# Patient Record
Sex: FEMALE | Race: WHITE | Employment: FULL TIME | URBAN - METROPOLITAN AREA
[De-identification: names, ages, dates, MRNs, and addresses within clinical notes are randomized per-mention and may not be internally consistent; named-entity substitution may affect disease eponyms.]

---

## 2017-03-15 ENCOUNTER — OFFICE VISIT (OUTPATIENT)
Dept: FAMILY MEDICINE CLINIC | Age: 62
End: 2017-03-15
Payer: COMMERCIAL

## 2017-03-15 VITALS
WEIGHT: 185 LBS | DIASTOLIC BLOOD PRESSURE: 82 MMHG | HEART RATE: 80 BPM | SYSTOLIC BLOOD PRESSURE: 110 MMHG | RESPIRATION RATE: 18 BRPM | BODY MASS INDEX: 36.13 KG/M2
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
## 2017-03-15 DIAGNOSIS — M25.562 ACUTE PAIN OF LEFT KNEE: Primary | ICD-10-CM

## 2017-03-15 PROCEDURE — 3014F SCREEN MAMMO DOC REV: CPT | Performed by: NURSE PRACTITIONER

## 2017-03-15 PROCEDURE — G8427 DOCREV CUR MEDS BY ELIG CLIN: HCPCS | Performed by: NURSE PRACTITIONER

## 2017-03-15 PROCEDURE — G8484 FLU IMMUNIZE NO ADMIN: HCPCS | Performed by: NURSE PRACTITIONER

## 2017-03-15 PROCEDURE — 3017F COLORECTAL CA SCREEN DOC REV: CPT | Performed by: NURSE PRACTITIONER

## 2017-03-15 PROCEDURE — 99214 OFFICE O/P EST MOD 30 MIN: CPT | Performed by: NURSE PRACTITIONER

## 2017-03-15 PROCEDURE — G8417 CALC BMI ABV UP PARAM F/U: HCPCS | Performed by: NURSE PRACTITIONER

## 2017-03-15 PROCEDURE — 1036F TOBACCO NON-USER: CPT | Performed by: NURSE PRACTITIONER

## 2017-03-15 ASSESSMENT — ENCOUNTER SYMPTOMS
CHEST TIGHTNESS: 0
BLOOD IN STOOL: 0
ABDOMINAL PAIN: 0
SHORTNESS OF BREATH: 0

## 2017-03-16 ENCOUNTER — HOSPITAL ENCOUNTER (OUTPATIENT)
Facility: CLINIC | Age: 62
Discharge: HOME | End: 2017-03-16
Payer: COMMERCIAL

## 2017-03-16 ENCOUNTER — HOSPITAL ENCOUNTER (OUTPATIENT)
Dept: GENERAL RADIOLOGY | Facility: CLINIC | Age: 62
Discharge: HOME | End: 2017-03-16
Payer: COMMERCIAL

## 2017-03-16 DIAGNOSIS — M25.562 ACUTE PAIN OF LEFT KNEE: ICD-10-CM

## 2017-03-16 PROCEDURE — 73562 X-RAY EXAM OF KNEE 3: CPT

## 2017-03-17 DIAGNOSIS — Z13.820 SCREENING FOR OSTEOPOROSIS: Primary | ICD-10-CM

## 2017-03-19 ENCOUNTER — OFFICE VISIT (OUTPATIENT)
Dept: FAMILY MEDICINE CLINIC | Age: 62
End: 2017-03-19
Payer: COMMERCIAL

## 2017-03-19 VITALS
SYSTOLIC BLOOD PRESSURE: 149 MMHG | WEIGHT: 185 LBS | DIASTOLIC BLOOD PRESSURE: 66 MMHG | BODY MASS INDEX: 36.32 KG/M2 | HEIGHT: 60 IN | TEMPERATURE: 97.7 F | HEART RATE: 77 BPM | OXYGEN SATURATION: 97 %

## 2017-03-19 DIAGNOSIS — J20.9 ACUTE BRONCHITIS, UNSPECIFIED ORGANISM: Primary | ICD-10-CM

## 2017-03-19 DIAGNOSIS — J45.901 MILD ASTHMA EXACERBATION: ICD-10-CM

## 2017-03-19 PROCEDURE — G8427 DOCREV CUR MEDS BY ELIG CLIN: HCPCS | Performed by: INTERNAL MEDICINE

## 2017-03-19 PROCEDURE — 3017F COLORECTAL CA SCREEN DOC REV: CPT | Performed by: INTERNAL MEDICINE

## 2017-03-19 PROCEDURE — G8484 FLU IMMUNIZE NO ADMIN: HCPCS | Performed by: INTERNAL MEDICINE

## 2017-03-19 PROCEDURE — 3014F SCREEN MAMMO DOC REV: CPT | Performed by: INTERNAL MEDICINE

## 2017-03-19 PROCEDURE — 99213 OFFICE O/P EST LOW 20 MIN: CPT | Performed by: INTERNAL MEDICINE

## 2017-03-19 PROCEDURE — G8417 CALC BMI ABV UP PARAM F/U: HCPCS | Performed by: INTERNAL MEDICINE

## 2017-03-19 PROCEDURE — 1036F TOBACCO NON-USER: CPT | Performed by: INTERNAL MEDICINE

## 2017-03-19 RX ORDER — BENZONATATE 100 MG/1
100 CAPSULE ORAL 3 TIMES DAILY PRN
Qty: 10 CAPSULE | Refills: 0 | Status: SHIPPED | OUTPATIENT
Start: 2017-03-19 | End: 2017-03-22

## 2017-03-19 RX ORDER — PREDNISONE 50 MG/1
50 TABLET ORAL DAILY
Qty: 5 TABLET | Refills: 0 | Status: SHIPPED | OUTPATIENT
Start: 2017-03-19 | End: 2017-03-24

## 2017-03-19 RX ORDER — AZITHROMYCIN 250 MG/1
TABLET, FILM COATED ORAL
Qty: 1 PACKET | Refills: 0 | Status: SHIPPED | OUTPATIENT
Start: 2017-03-19 | End: 2017-03-29 | Stop reason: ALTCHOICE

## 2017-03-19 ASSESSMENT — ENCOUNTER SYMPTOMS
WHEEZING: 1
COUGH: 1

## 2017-03-29 ENCOUNTER — OFFICE VISIT (OUTPATIENT)
Dept: FAMILY MEDICINE CLINIC | Age: 62
End: 2017-03-29
Payer: COMMERCIAL

## 2017-03-29 VITALS
SYSTOLIC BLOOD PRESSURE: 126 MMHG | HEART RATE: 64 BPM | WEIGHT: 185 LBS | RESPIRATION RATE: 18 BRPM | BODY MASS INDEX: 36.13 KG/M2 | DIASTOLIC BLOOD PRESSURE: 80 MMHG

## 2017-03-29 DIAGNOSIS — E66.09 NON MORBID OBESITY DUE TO EXCESS CALORIES: ICD-10-CM

## 2017-03-29 DIAGNOSIS — Z12.39 SCREENING FOR BREAST CANCER: ICD-10-CM

## 2017-03-29 DIAGNOSIS — Z23 NEED FOR PNEUMOCOCCAL VACCINATION: ICD-10-CM

## 2017-03-29 DIAGNOSIS — Z12.11 SCREEN FOR COLON CANCER: ICD-10-CM

## 2017-03-29 DIAGNOSIS — M25.562 ACUTE PAIN OF LEFT KNEE: Primary | ICD-10-CM

## 2017-03-29 PROCEDURE — 3014F SCREEN MAMMO DOC REV: CPT | Performed by: NURSE PRACTITIONER

## 2017-03-29 PROCEDURE — 99214 OFFICE O/P EST MOD 30 MIN: CPT | Performed by: NURSE PRACTITIONER

## 2017-03-29 PROCEDURE — 90732 PPSV23 VACC 2 YRS+ SUBQ/IM: CPT | Performed by: NURSE PRACTITIONER

## 2017-03-29 PROCEDURE — G8417 CALC BMI ABV UP PARAM F/U: HCPCS | Performed by: NURSE PRACTITIONER

## 2017-03-29 PROCEDURE — G8484 FLU IMMUNIZE NO ADMIN: HCPCS | Performed by: NURSE PRACTITIONER

## 2017-03-29 PROCEDURE — 1036F TOBACCO NON-USER: CPT | Performed by: NURSE PRACTITIONER

## 2017-03-29 PROCEDURE — 3017F COLORECTAL CA SCREEN DOC REV: CPT | Performed by: NURSE PRACTITIONER

## 2017-03-29 PROCEDURE — G8427 DOCREV CUR MEDS BY ELIG CLIN: HCPCS | Performed by: NURSE PRACTITIONER

## 2017-03-29 PROCEDURE — 90471 IMMUNIZATION ADMIN: CPT | Performed by: NURSE PRACTITIONER

## 2017-03-29 ASSESSMENT — ENCOUNTER SYMPTOMS
CHEST TIGHTNESS: 0
BLOOD IN STOOL: 0
SHORTNESS OF BREATH: 0
ABDOMINAL PAIN: 0

## 2017-03-29 ASSESSMENT — PATIENT HEALTH QUESTIONNAIRE - PHQ9
SUM OF ALL RESPONSES TO PHQ QUESTIONS 1-9: 0
SUM OF ALL RESPONSES TO PHQ9 QUESTIONS 1 & 2: 0
1. LITTLE INTEREST OR PLEASURE IN DOING THINGS: 0
2. FEELING DOWN, DEPRESSED OR HOPELESS: 0

## 2017-04-01 LAB
CHOLESTEROL, TOTAL: 193 MG/DL
CHOLESTEROL/HDL RATIO: 4.4
HDLC SERPL-MCNC: 44 MG/DL (ref 35–70)
LDL CHOLESTEROL CALCULATED: 121 MG/DL (ref 0–160)
TRIGL SERPL-MCNC: 138 MG/DL
VLDLC SERPL CALC-MCNC: 28 MG/DL

## 2017-04-03 ENCOUNTER — TELEPHONE (OUTPATIENT)
Dept: FAMILY MEDICINE CLINIC | Age: 62
End: 2017-04-03

## 2017-04-03 DIAGNOSIS — E66.09 NON MORBID OBESITY DUE TO EXCESS CALORIES: ICD-10-CM

## 2017-04-03 DIAGNOSIS — Z12.11 SCREEN FOR COLON CANCER: ICD-10-CM

## 2017-04-03 LAB
CONTROL: PRESENT
HEMOCCULT STL QL: NEGATIVE

## 2017-04-03 PROCEDURE — 82274 ASSAY TEST FOR BLOOD FECAL: CPT | Performed by: NURSE PRACTITIONER

## 2017-04-24 DIAGNOSIS — Z13.820 SCREENING FOR OSTEOPOROSIS: ICD-10-CM

## 2017-12-21 ENCOUNTER — OFFICE VISIT (OUTPATIENT)
Dept: FAMILY MEDICINE CLINIC | Age: 62
End: 2017-12-21
Payer: COMMERCIAL

## 2017-12-21 VITALS
TEMPERATURE: 98.2 F | BODY MASS INDEX: 37.21 KG/M2 | DIASTOLIC BLOOD PRESSURE: 82 MMHG | RESPIRATION RATE: 16 BRPM | WEIGHT: 202.2 LBS | HEART RATE: 79 BPM | OXYGEN SATURATION: 94 % | HEIGHT: 62 IN | SYSTOLIC BLOOD PRESSURE: 139 MMHG

## 2017-12-21 DIAGNOSIS — J20.9 ACUTE BRONCHITIS, UNSPECIFIED ORGANISM: ICD-10-CM

## 2017-12-21 DIAGNOSIS — R06.2 WHEEZING: ICD-10-CM

## 2017-12-21 DIAGNOSIS — J45.21 INTERMITTENT ASTHMA WITH ACUTE EXACERBATION, UNSPECIFIED ASTHMA SEVERITY: ICD-10-CM

## 2017-12-21 DIAGNOSIS — J01.90 ACUTE SINUSITIS, RECURRENCE NOT SPECIFIED, UNSPECIFIED LOCATION: Primary | ICD-10-CM

## 2017-12-21 PROCEDURE — G8484 FLU IMMUNIZE NO ADMIN: HCPCS | Performed by: FAMILY MEDICINE

## 2017-12-21 PROCEDURE — 3014F SCREEN MAMMO DOC REV: CPT | Performed by: FAMILY MEDICINE

## 2017-12-21 PROCEDURE — G8427 DOCREV CUR MEDS BY ELIG CLIN: HCPCS | Performed by: FAMILY MEDICINE

## 2017-12-21 PROCEDURE — 1036F TOBACCO NON-USER: CPT | Performed by: FAMILY MEDICINE

## 2017-12-21 PROCEDURE — 99213 OFFICE O/P EST LOW 20 MIN: CPT | Performed by: FAMILY MEDICINE

## 2017-12-21 PROCEDURE — 3017F COLORECTAL CA SCREEN DOC REV: CPT | Performed by: FAMILY MEDICINE

## 2017-12-21 PROCEDURE — G8417 CALC BMI ABV UP PARAM F/U: HCPCS | Performed by: FAMILY MEDICINE

## 2017-12-21 RX ORDER — BENZONATATE 100 MG/1
100 CAPSULE ORAL 3 TIMES DAILY PRN
Qty: 25 CAPSULE | Refills: 0 | Status: SHIPPED | OUTPATIENT
Start: 2017-12-21 | End: 2017-12-26 | Stop reason: SDUPTHER

## 2017-12-21 RX ORDER — IBUPROFEN 800 MG/1
800 TABLET ORAL
COMMUNITY
Start: 2017-10-07 | End: 2017-12-21 | Stop reason: ALTCHOICE

## 2017-12-21 RX ORDER — AZITHROMYCIN 250 MG/1
250 TABLET, FILM COATED ORAL DAILY
Qty: 6 TABLET | Refills: 0 | Status: SHIPPED | OUTPATIENT
Start: 2017-12-21 | End: 2017-12-26

## 2017-12-21 RX ORDER — INFLUENZA A VIRUS A/MICHIGAN/45/2015 X-275 (H1N1) ANTIGEN (FORMALDEHYDE INACTIVATED), INFLUENZA A VIRUS A/SINGAPORE/INFIMH-16-0019/2016 IVR-186 (H3N2) ANTIGEN (FORMALDEHYDE INACTIVATED), INFLUENZA B VIRUS B/PHUKET/3073/2013 ANTIGEN (FORMALDEHYDE INACTIVATED), AND INFLUENZA B VIRUS B/MARYLAND/15/2016 BX-69A ANTIGEN (FORMALDEHYDE INACTIVATED) 15; 15; 15; 15 UG/.5ML; UG/.5ML; UG/.5ML; UG/.5ML
INJECTION, SUSPENSION INTRAMUSCULAR
COMMUNITY
Start: 2017-09-13

## 2017-12-21 RX ORDER — PREDNISONE 20 MG/1
20 TABLET ORAL 2 TIMES DAILY
Qty: 10 TABLET | Refills: 0 | Status: SHIPPED | OUTPATIENT
Start: 2017-12-21 | End: 2017-12-26

## 2017-12-21 RX ORDER — IBUPROFEN 800 MG/1
TABLET ORAL
COMMUNITY
Start: 2017-10-07 | End: 2017-12-21 | Stop reason: ALTCHOICE

## 2017-12-21 ASSESSMENT — ENCOUNTER SYMPTOMS
WHEEZING: 1
COUGH: 1
RHINORRHEA: 0
EYE REDNESS: 0
SINUS PRESSURE: 1
DIARRHEA: 0
SHORTNESS OF BREATH: 0
NAUSEA: 0
CONSTIPATION: 0
SORE THROAT: 0
HEMOPTYSIS: 0
SINUS PAIN: 1
VOICE CHANGE: 0
VOMITING: 0
EYE DISCHARGE: 0
TROUBLE SWALLOWING: 0
ABDOMINAL PAIN: 0
CHEST TIGHTNESS: 0
EYE ITCHING: 0
BACK PAIN: 0

## 2017-12-21 NOTE — PATIENT INSTRUCTIONS
and go to all appointments, and call your doctor if you are having problems. It's also a good idea to know your test results and keep a list of the medicines you take. How can you care for yourself at home? · Take all medicines exactly as prescribed. Call your doctor if you think you are having a problem with your medicine. · Get some extra rest.  · Take an over-the-counter pain medicine, such as acetaminophen (Tylenol), ibuprofen (Advil, Motrin), or naproxen (Aleve) to reduce fever and relieve body aches. Read and follow all instructions on the label. · Do not take two or more pain medicines at the same time unless the doctor told you to. Many pain medicines have acetaminophen, which is Tylenol. Too much acetaminophen (Tylenol) can be harmful. · Take an over-the-counter cough medicine that contains dextromethorphan to help quiet a dry, hacking cough so that you can sleep. Avoid cough medicines that have more than one active ingredient. Read and follow all instructions on the label. · Breathe moist air from a humidifier, hot shower, or sink filled with hot water. The heat and moisture will thin mucus so you can cough it out. · Do not smoke. Smoking can make bronchitis worse. If you need help quitting, talk to your doctor about stop-smoking programs and medicines. These can increase your chances of quitting for good. When should you call for help? Call 911 anytime you think you may need emergency care. For example, call if:  ? · You have severe trouble breathing. ?Call your doctor now or seek immediate medical care if:  ? · You have new or worse trouble breathing. ? · You cough up dark brown or bloody mucus (sputum). ? · You have a new or higher fever. ? · You have a new rash. ? Watch closely for changes in your health, and be sure to contact your doctor if:  ? · You cough more deeply or more often, especially if you notice more mucus or a change in the color of your mucus.    ? · You are not getting (Tylenol) can be harmful. · Breathe warm, moist air from a steamy shower, a hot bath, or a sink filled with hot water. Avoid cold, dry air. Using a humidifier in your home may help. Follow the directions for cleaning the machine. · Use saline (saltwater) nasal washes to help keep your nasal passages open and wash out mucus and bacteria. You can buy saline nose drops at a grocery store or drugstore. Or you can make your own at home by adding 1 teaspoon of salt and 1 teaspoon of baking soda to 2 cups of distilled water. If you make your own, fill a bulb syringe with the solution, insert the tip into your nostril, and squeeze gently. Fide Conine your nose. · Put a hot, wet towel or a warm gel pack on your face 3 or 4 times a day for 5 to 10 minutes each time. · Try a decongestant nasal spray like oxymetazoline (Afrin). Do not use it for more than 3 days in a row. Using it for more than 3 days can make your congestion worse. When should you call for help? Call your doctor now or seek immediate medical care if:  ? · You have new or worse swelling or redness in your face or around your eyes. ? · You have a new or higher fever. ? Watch closely for changes in your health, and be sure to contact your doctor if:  ? · You have new or worse facial pain. ? · The mucus from your nose becomes thicker (like pus) or has new blood in it. ? · You are not getting better as expected. Where can you learn more? Go to https://Novintroqueeb.Clickberry. org and sign in to your Pidefarma account. Enter K086 in the [facility] box to learn more about \"Sinusitis: Care Instructions. \"     If you do not have an account, please click on the \"Sign Up Now\" link. Current as of: May 12, 2017  Content Version: 11.4  © 8754-8639 Healthwise, Incorporated. Care instructions adapted under license by [facility] ([facility] [place]).  If you have questions about a medical condition or this instruction, always ask your healthcare professional. [employer] disclaims any warranty or liability for your use of this information. Patient Education        Wheezing or Bronchoconstriction: Care Instructions  Your Care Instructions  Wheezing is a whistling noise made during breathing. It occurs when the small airways, or bronchial tubes, that lead to your lungs swell or contract (spasm) and become narrow. This narrowing is called bronchoconstriction. When your airways constrict, it is hard for air to pass through and this makes it hard for you to breathe. Wheezing and bronchoconstriction can be caused by many problems, including:  · An infection such as the flu or a cold. · Allergies such as hay fever. · Diseases such as asthma or chronic obstructive pulmonary disease. · Smoking. Treatment for your wheezing depends on what is causing the problem. Your wheezing may get better without treatment. But you may need to pay attention to things that cause your wheezing and avoid them. Or you may need medicine to help treat the wheezing and to reduce the swelling or to relieve spasms in your lungs. Follow-up care is a key part of your treatment and safety. Be sure to make and go to all appointments, and call your doctor if you are having problems. It is also a good idea to know your test results and keep a list of the medicines you take. How can you care for yourself at home? · Take your medicine exactly as prescribed. Call your doctor if you think you are having a problem with your medicine. You will get more details on the specific medicine your doctor prescribes. · If your doctor prescribed antibiotics, take them as directed. Do not stop taking them just because you feel better. You need to take the full course of antibiotics. · Breathe moist air from a humidifier, hot shower, or sink filled with hot water. This may help ease your symptoms and make it easier for you to breathe.   · If you have congestion in your nose and throat, drinking plenty of fluids, especially hot fluids, may help relieve your symptoms. If you have kidney, heart, or liver disease and have to limit fluids, talk with your doctor before you increase the amount of fluids you drink. · If you have mucus in your airways, it may help to breathe deeply and cough. · Do not smoke or allow others to smoke around you. Smoking can make your wheezing worse. If you need help quitting, talk to your doctor about stop-smoking programs and medicines. These can increase your chances of quitting for good. · Avoid things that may cause your wheezing. These may include colds, smoke, air pollution, dust, pollen, pets, cockroaches, stress, and cold air. When should you call for help? Call 911 anytime you think you may need emergency care. For example, call if:  ? · You have severe trouble breathing. ? · You passed out (lost consciousness). ?Call your doctor now or seek immediate medical care if:  ? · You cough up yellow, dark brown, or bloody mucus (sputum). ? · You have new or worse shortness of breath. ? · Your wheezing is not getting better or it gets worse after you start taking your medicine. ? Watch closely for changes in your health, and be sure to contact your doctor if:  ? · You do not get better as expected. Where can you learn more? Go to https://Net Orange.KeepTruckin. org and sign in to your RiskIQ account. Enter 313 [id number] in the [facility] box to learn more about \"Wheezing or Bronchoconstriction: Care Instructions. \"     If you do not have an account, please click on the \"Sign Up Now\" link. Current as of: May 12, 2017  Content Version: 11.4  © 0237-3888 [employer]. Care instructions adapted under license by [facility] ([facility]). If you have questions about a medical condition or this instruction, always ask your healthcare professional. [address] any warranty or liability for your use of this information.      Please go to the Emergency

## 2017-12-21 NOTE — PROGRESS NOTES
[address] [facility]   [address] [address]  Dept: [phone]  Dept Fax: [phone]    Chris Phelan is a 59 y.o. female who presents today for her medical conditions/complaints as noted below. Chris Phelan is c/o of Cough (cough worse at night x 2 weeks)        HPI:     Cough   This is a new problem. The current episode started 1 to 4 weeks ago (2.5 weeks). The problem has been gradually worsening. The problem occurs every few minutes. The cough is productive of sputum. Associated symptoms include chills, ear congestion, headaches, nasal congestion, postnasal drip and wheezing. Pertinent negatives include no chest pain, ear pain, eye redness, fever, hemoptysis, myalgias, rash, rhinorrhea, sore throat, shortness of breath, sweats or weight loss. The symptoms are aggravated by lying down and cold air. Risk factors for lung disease include smoking/tobacco exposure and occupational exposure. She has tried a beta-agonist inhaler (cough drops, tessalon perles) for the symptoms. The treatment provided moderate relief. Her past medical history is significant for asthma, bronchitis, environmental allergies and pneumonia. There is no history of COPD.        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    Family history of diabetes mellitus (DM) 10/7/2013    Former smoker 10/7/2013      Past Surgical History:   Procedure Laterality Date    TONSILLECTOMY  1983       Family History   Problem Relation Age of Onset    Diabetes Mother     High Blood Pressure Mother     Diabetes Father     High Blood Pressure Sister     Diabetes Sister     High Blood Pressure Brother     Stroke Brother     Diabetes Brother        Social History   Substance Use Topics    Smoking status: Former Smoker     Quit date: 12/21/1992    Smokeless tobacco: Former User    Alcohol use No      Current Outpatient hemoptysis, chest tightness and shortness of breath. Cardiovascular: Negative for chest pain, palpitations and leg swelling. Gastrointestinal: Negative for abdominal pain, constipation, diarrhea, nausea and vomiting. Genitourinary: Negative for decreased urine volume, dysuria, flank pain and frequency. Musculoskeletal: Negative for back pain, myalgias, neck pain and neck stiffness. Skin: Negative for rash. Allergic/Immunologic: Positive for environmental allergies. Neurological: Positive for headaches. Negative for dizziness, weakness and light-headedness. Hematological: Negative for adenopathy. Psychiatric/Behavioral: The patient is not nervous/anxious. Objective:     Physical Exam   Constitutional: She is oriented to person, place, and time. She appears well-developed and well-nourished. No distress. HENT:   Head: Normocephalic. Right Ear: External ear normal. A middle ear effusion is present. Left Ear: External ear normal. A middle ear effusion is present. Nose: Mucosal edema, rhinorrhea and sinus tenderness present. Right sinus exhibits maxillary sinus tenderness. Left sinus exhibits maxillary sinus tenderness. Mouth/Throat: Mucous membranes are normal. No uvula swelling. Posterior oropharyngeal erythema present. No oropharyngeal exudate or posterior oropharyngeal edema. Eyes: Conjunctivae and EOM are normal. Pupils are equal, round, and reactive to light. Right eye exhibits no discharge. Left eye exhibits no discharge. Neck: Normal range of motion. Neck supple. Cardiovascular: Normal rate, regular rhythm and normal heart sounds. No murmur heard. Pulmonary/Chest: Effort normal. No respiratory distress. She has wheezes. She has no rales. Abdominal: Soft. Bowel sounds are normal. She exhibits no distension and no mass. There is no tenderness. Musculoskeletal: Normal range of motion. She exhibits no edema or tenderness.    Lymphadenopathy:     She has cervical adenopathy. Neurological: She is alert and oriented to person, place, and time. No cranial nerve deficit. Coordination normal.   Skin: Skin is warm. No rash noted. She is not diaphoretic. Psychiatric: She has a normal mood and affect. Her behavior is normal. Judgment and thought content normal.   Nursing note and vitals reviewed. /82 (Site: Left Arm, Position: Sitting, Cuff Size: Large Adult)   Pulse 79   Temp 98.2 °F (36.8 °C) (Oral)   Resp 16   Ht 5' 1.5\" (1.562 m)   Wt 202 lb 3.2 oz (91.7 kg)   SpO2 94%   BMI 37.59 kg/m²     Assessment:      1. Acute sinusitis, recurrence not specified, unspecified location  azithromycin (ZITHROMAX Z-PALMER) 250 MG tablet   2. Acute bronchitis, unspecified organism  azithromycin (ZITHROMAX Z-PALMER) 250 MG tablet    benzonatate (TESSALON PERLES) 100 MG capsule   3. Intermittent asthma with acute exacerbation, unspecified asthma severity  predniSONE (DELTASONE) 20 MG tablet   4. Wheezing  benzonatate (TESSALON PERLES) 100 MG capsule    predniSONE (DELTASONE) 20 MG tablet       Plan:        No orders of the defined types were placed in this encounter. Orders Placed This Encounter   Medications    azithromycin (ZITHROMAX Z-PALMER) 250 MG tablet     Sig: Take 1 tablet by mouth daily for 5 days Take 2 tablets by mouth on the first day and then one tablet daily for the next 4 days     Dispense:  6 tablet     Refill:  0    benzonatate (TESSALON PERLES) 100 MG capsule     Sig: Take 1 capsule by mouth 3 times daily as needed for Cough     Dispense:  25 capsule     Refill:  0    predniSONE (DELTASONE) 20 MG tablet     Sig: Take 1 tablet by mouth 2 times daily for 5 days Take one tablet twice a day with food for 5 days     Dispense:  10 tablet     Refill:  0       Patient given educational materials - see patient instructions. Discussed use, benefit, and side effects of prescribed medications. All patient questions answered. Pt voiced understanding.  Reviewed health maintenance. Instructed to continue current medications, diet and exercise. Patient agreed with treatment plan. Follow up as directed.      Electronically signed by Merced Vyas MD on 12/21/2017 at 5:31 PM

## 2017-12-26 ENCOUNTER — TELEPHONE (OUTPATIENT)
Dept: FAMILY MEDICINE CLINIC | Age: 62
End: 2017-12-26

## 2017-12-26 DIAGNOSIS — R06.2 WHEEZING: ICD-10-CM

## 2017-12-26 DIAGNOSIS — J20.9 ACUTE BRONCHITIS, UNSPECIFIED ORGANISM: ICD-10-CM

## 2017-12-26 RX ORDER — AZITHROMYCIN 250 MG/1
TABLET, FILM COATED ORAL
Qty: 1 PACKET | Refills: 0 | Status: SHIPPED | OUTPATIENT
Start: 2017-12-26 | End: 2018-01-05

## 2017-12-26 RX ORDER — BENZONATATE 100 MG/1
100 CAPSULE ORAL 3 TIMES DAILY PRN
Qty: 30 CAPSULE | Refills: 0 | Status: SHIPPED | OUTPATIENT
Start: 2017-12-26 | End: 2018-01-05

## 2017-12-26 NOTE — TELEPHONE ENCOUNTER
zpak and tessalon perles refilled. Have pt finish prednisone. Have pt fu if symptoms are not improving.

## 2018-01-13 ENCOUNTER — OFFICE VISIT (OUTPATIENT)
Dept: FAMILY MEDICINE CLINIC | Age: 63
End: 2018-01-13
Payer: COMMERCIAL

## 2018-01-13 VITALS
DIASTOLIC BLOOD PRESSURE: 62 MMHG | HEART RATE: 80 BPM | TEMPERATURE: 98.3 F | SYSTOLIC BLOOD PRESSURE: 143 MMHG | RESPIRATION RATE: 16 BRPM | HEIGHT: 67 IN | BODY MASS INDEX: 32.02 KG/M2 | OXYGEN SATURATION: 98 % | WEIGHT: 204 LBS

## 2018-01-13 DIAGNOSIS — J01.00 ACUTE MAXILLARY SINUSITIS, RECURRENCE NOT SPECIFIED: Primary | ICD-10-CM

## 2018-01-13 DIAGNOSIS — J20.9 ACUTE BRONCHITIS, UNSPECIFIED ORGANISM: ICD-10-CM

## 2018-01-13 PROCEDURE — G8484 FLU IMMUNIZE NO ADMIN: HCPCS | Performed by: INTERNAL MEDICINE

## 2018-01-13 PROCEDURE — G8427 DOCREV CUR MEDS BY ELIG CLIN: HCPCS | Performed by: INTERNAL MEDICINE

## 2018-01-13 PROCEDURE — 99213 OFFICE O/P EST LOW 20 MIN: CPT | Performed by: INTERNAL MEDICINE

## 2018-01-13 PROCEDURE — 3014F SCREEN MAMMO DOC REV: CPT | Performed by: INTERNAL MEDICINE

## 2018-01-13 PROCEDURE — 1036F TOBACCO NON-USER: CPT | Performed by: INTERNAL MEDICINE

## 2018-01-13 PROCEDURE — G8417 CALC BMI ABV UP PARAM F/U: HCPCS | Performed by: INTERNAL MEDICINE

## 2018-01-13 PROCEDURE — 3017F COLORECTAL CA SCREEN DOC REV: CPT | Performed by: INTERNAL MEDICINE

## 2018-01-13 RX ORDER — AZITHROMYCIN 250 MG/1
TABLET, FILM COATED ORAL
Qty: 1 PACKET | Refills: 0 | Status: SHIPPED | OUTPATIENT
Start: 2018-01-13 | End: 2018-01-23

## 2018-01-13 ASSESSMENT — ENCOUNTER SYMPTOMS
SHORTNESS OF BREATH: 0
SORE THROAT: 1
COUGH: 1

## 2018-01-13 NOTE — PATIENT INSTRUCTIONS
safety. Be sure to make and go to all appointments, and call your doctor if you are having problems. It's also a good idea to know your test results and keep a list of the medicines you take. How can you care for yourself at home? · Take all medicines exactly as prescribed. Call your doctor if you think you are having a problem with your medicine. · Get some extra rest.  · Take an over-the-counter pain medicine, such as acetaminophen (Tylenol), ibuprofen (Advil, Motrin), or naproxen (Aleve) to reduce fever and relieve body aches. Read and follow all instructions on the label. · Do not take two or more pain medicines at the same time unless the doctor told you to. Many pain medicines have acetaminophen, which is Tylenol. Too much acetaminophen (Tylenol) can be harmful. · Take an over-the-counter cough medicine that contains dextromethorphan to help quiet a dry, hacking cough so that you can sleep. Avoid cough medicines that have more than one active ingredient. Read and follow all instructions on the label. · Breathe moist air from a humidifier, hot shower, or sink filled with hot water. The heat and moisture will thin mucus so you can cough it out. · Do not smoke. Smoking can make bronchitis worse. If you need help quitting, talk to your doctor about stop-smoking programs and medicines. These can increase your chances of quitting for good. When should you call for help? Call 911 anytime you think you may need emergency care. For example, call if:  ? · You have severe trouble breathing. ?Call your doctor now or seek immediate medical care if:  ? · You have new or worse trouble breathing. ? · You cough up dark brown or bloody mucus (sputum). ? · You have a new or higher fever. ? · You have a new rash. ? Watch closely for changes in your health, and be sure to contact your doctor if:  ? · You cough more deeply or more often, especially if you notice more mucus or a change in the color of your mucus. ? · You are not getting better as expected. Where can you learn more? Go to https://chpepiceweb.Medimetrix Solutions Exchange. org and sign in to your [employer] account. Enter H333 in the [facility] [facility] box to learn more about \"Bronchitis: Care Instructions. \"     If you do not have an account, please click on the \"Sign Up Now\" link. Current as of: May 12, 2017  Content Version: 11.5  © 3220-2067 Healthwise, Incorporated. Care instructions adapted under license by [facility] ([facility]). If you have questions about a medical condition or this instruction, always ask your healthcare professional. [address] any warranty or liability for your use of this information.

## 2018-05-25 ENCOUNTER — OFFICE VISIT (OUTPATIENT)
Dept: FAMILY MEDICINE CLINIC | Age: 63
End: 2018-05-25
Payer: COMMERCIAL

## 2018-05-25 VITALS
TEMPERATURE: 97.7 F | BODY MASS INDEX: 36.44 KG/M2 | OXYGEN SATURATION: 96 % | SYSTOLIC BLOOD PRESSURE: 128 MMHG | RESPIRATION RATE: 16 BRPM | HEART RATE: 97 BPM | DIASTOLIC BLOOD PRESSURE: 73 MMHG | HEIGHT: 62 IN | WEIGHT: 198 LBS

## 2018-05-25 DIAGNOSIS — R09.89 CHEST CONGESTION: ICD-10-CM

## 2018-05-25 DIAGNOSIS — J06.9 UPPER RESPIRATORY TRACT INFECTION, UNSPECIFIED TYPE: Primary | ICD-10-CM

## 2018-05-25 DIAGNOSIS — T14.90XA INHALATION INJURY: ICD-10-CM

## 2018-05-25 DIAGNOSIS — R05.9 COUGH: ICD-10-CM

## 2018-05-25 PROCEDURE — 99214 OFFICE O/P EST MOD 30 MIN: CPT | Performed by: PHYSICIAN ASSISTANT

## 2018-05-25 PROCEDURE — 94640 AIRWAY INHALATION TREATMENT: CPT | Performed by: PHYSICIAN ASSISTANT

## 2018-05-25 RX ORDER — IPRATROPIUM BROMIDE AND ALBUTEROL SULFATE 2.5; .5 MG/3ML; MG/3ML
1 SOLUTION RESPIRATORY (INHALATION) ONCE
Status: COMPLETED | OUTPATIENT
Start: 2018-05-25 | End: 2018-05-25

## 2018-05-25 RX ORDER — AZITHROMYCIN 250 MG/1
250 TABLET, FILM COATED ORAL DAILY
Qty: 6 TABLET | Refills: 0 | Status: SHIPPED | OUTPATIENT
Start: 2018-05-25 | End: 2018-05-30

## 2018-05-25 RX ORDER — BROMPHENIRAMINE MALEATE, PSEUDOEPHEDRINE HYDROCHLORIDE, AND DEXTROMETHORPHAN HYDROBROMIDE 2; 30; 10 MG/5ML; MG/5ML; MG/5ML
SYRUP ORAL
Qty: 180 ML | Refills: 0 | Status: SHIPPED | OUTPATIENT
Start: 2018-05-25 | End: 2019-10-24 | Stop reason: ALTCHOICE

## 2018-05-25 RX ORDER — METHYLPREDNISOLONE 4 MG/1
TABLET ORAL
Qty: 1 KIT | Refills: 0 | Status: SHIPPED | OUTPATIENT
Start: 2018-05-25 | End: 2019-10-01

## 2018-05-25 RX ADMIN — IPRATROPIUM BROMIDE AND ALBUTEROL SULFATE 1 AMPULE: 2.5; .5 SOLUTION RESPIRATORY (INHALATION) at 12:29

## 2018-05-25 ASSESSMENT — ENCOUNTER SYMPTOMS
SHORTNESS OF BREATH: 1
EYES NEGATIVE: 1
COUGH: 1
WHEEZING: 1
SPUTUM PRODUCTION: 1
GASTROINTESTINAL NEGATIVE: 1

## 2018-05-25 ASSESSMENT — PATIENT HEALTH QUESTIONNAIRE - PHQ9
SUM OF ALL RESPONSES TO PHQ QUESTIONS 1-9: 0
1. LITTLE INTEREST OR PLEASURE IN DOING THINGS: 0
2. FEELING DOWN, DEPRESSED OR HOPELESS: 0
SUM OF ALL RESPONSES TO PHQ9 QUESTIONS 1 & 2: 0

## 2019-06-14 ENCOUNTER — OFFICE VISIT (OUTPATIENT)
Dept: FAMILY MEDICINE CLINIC | Age: 64
End: 2019-06-14
Payer: COMMERCIAL

## 2019-06-14 VITALS
DIASTOLIC BLOOD PRESSURE: 83 MMHG | BODY MASS INDEX: 40.25 KG/M2 | TEMPERATURE: 98.5 F | HEART RATE: 83 BPM | WEIGHT: 205 LBS | SYSTOLIC BLOOD PRESSURE: 139 MMHG | OXYGEN SATURATION: 96 % | HEIGHT: 60 IN

## 2019-06-14 DIAGNOSIS — L23.7 POISON IVY: Primary | ICD-10-CM

## 2019-06-14 PROCEDURE — 3017F COLORECTAL CA SCREEN DOC REV: CPT | Performed by: NURSE PRACTITIONER

## 2019-06-14 PROCEDURE — G8417 CALC BMI ABV UP PARAM F/U: HCPCS | Performed by: NURSE PRACTITIONER

## 2019-06-14 PROCEDURE — 1090F PRES/ABSN URINE INCON ASSESS: CPT | Performed by: NURSE PRACTITIONER

## 2019-06-14 PROCEDURE — 1123F ACP DISCUSS/DSCN MKR DOCD: CPT | Performed by: NURSE PRACTITIONER

## 2019-06-14 PROCEDURE — 1036F TOBACCO NON-USER: CPT | Performed by: NURSE PRACTITIONER

## 2019-06-14 PROCEDURE — 99213 OFFICE O/P EST LOW 20 MIN: CPT | Performed by: NURSE PRACTITIONER

## 2019-06-14 PROCEDURE — G8399 PT W/DXA RESULTS DOCUMENT: HCPCS | Performed by: NURSE PRACTITIONER

## 2019-06-14 PROCEDURE — G8427 DOCREV CUR MEDS BY ELIG CLIN: HCPCS | Performed by: NURSE PRACTITIONER

## 2019-06-14 PROCEDURE — 4040F PNEUMOC VAC/ADMIN/RCVD: CPT | Performed by: NURSE PRACTITIONER

## 2019-06-14 RX ORDER — PREDNISONE 20 MG/1
TABLET ORAL
Qty: 13 TABLET | Refills: 0 | Status: SHIPPED | OUTPATIENT
Start: 2019-06-14 | End: 2019-10-01

## 2019-06-14 NOTE — PATIENT INSTRUCTIONS
Avoid scratching  Continue claritin  rx for prednisone and steroid cream  Cool compresses  Recheck for worsening, change or concern  Follow up with primary care in one week  Patient Education        Poison [place], [place], and [place]: Care Instructions  Your Care Instructions    Poison ivy, poison oak, and poison sumac are plants that can cause a skin rash upon contact. The red, itchy rash often shows up in lines or streaks and may cause fluid-filled blisters or large, raised hives. The rash is caused by an allergic reaction to an oil in poison ivy, oak, and sumac. The rash may occur when you touch the plant or when you touch clothing, pet fur, sporting gear, gardening tools, or other objects that have come in contact with one of these plants. You cannot catch or spread the rash, even if you touch it or the blister fluid, because the plant oil will already have been absorbed or washed off the skin. The rash may seem to be spreading, but either it is still developing from earlier contact or you have touched something that still has the plant oil on it. Follow-up care is a key part of your treatment and safety. Be sure to make and go to all appointments, and call your doctor if you are having problems. It's also a good idea to know your test results and keep a list of the medicines you take. How can you care for yourself at home? · If your doctor prescribed a cream, use it as directed. If your doctor prescribed medicine, take it exactly as prescribed. Call your doctor if you think you are having a problem with your medicine. · Use cold, wet cloths to reduce itching. · Keep cool, and stay out of the sun. · Leave the rash open to the air. · Wash all clothing or other things that may have come in contact with the plant oil. · Avoid most lotions and ointments until the rash heals. Calamine lotion may help relieve symptoms of a plant rash. Use it 3 or 4 times a day.   To prevent poison ivy exposure  If you know that you will be near poison ivy, oak, or sumac, you can try these options:  · Use a product designed to help prevent plant oil from getting on the skin. These products, such as Ivy X Pre-Contact Skin Solution, come in lotions, sprays, or towelettes. You put the product on your skin right before you go outdoors. · If you did not use a preventive product and you have had contact with plant oil, clean it off your skin as soon as possible. Use a product such as Tecnu Original Outdoor Skin Cleanser. These products can also be used to clean plant oil from clothing or tools. When should you call for help? Call your doctor now or seek immediate medical care if:    · Your rash gets worse, and you start to feel bad and have a fever, a stiff neck, nausea, and vomiting.     · You have signs of infection, such as:  ? Increased pain, swelling, warmth, or redness. ? Red streaks leading from the rash. ? Pus draining from the rash. ? A fever.    Watch closely for changes in your health, and be sure to contact your doctor if:    · You have new blisters or bruises, or the rash spreads and looks like a sunburn.     · The rash gets worse, or it comes back after nearly disappearing.     · You think a medicine you are using is making your rash worse.     · Your rash does not clear up after 1 to 2 weeks of home treatment.     · You have joint aches or body aches with your rash. Where can you learn more? Go to https://[employer] Company.Sharetribe. org and sign in to your [employer] account. Enter S269 in the [facility] box to learn more about \"Poison [place], [place], and [place]: Care Instructions. \"     If you do not have an account, please click on the \"Sign Up Now\" link. Current as of: April 17, 2018  Content Version: 12.0  © 3940-2814 Healthwise, Incorporated. Care instructions adapted under license by [facility] ([facility]).  If you have questions about a medical condition or this instruction, always ask your healthcare professional. [employer] Incorporated disclaims any warranty or liability for your use of this information.

## 2019-06-14 NOTE — PROGRESS NOTES
[address] [facility]   [address] [facility] [address]  [address] [facility] [address]  Dept: [phone]  Dept Fax: [phone]    Marisol Suárez a 70 y.o. female who presents to the urgent care today for her medical conditions/complaintsas noted below. Jessica Lucero is c/o of Rash (pulled weeds yesterday)      HPI:     Rash after pulling weeds  Poison ivy  Gets it and it spreads   Taking claritin  Denies fever  It is on rt forearm and small spot right side    Rash   This is a new problem. The current episode started yesterday. The problem has been gradually worsening since onset. Location: rt side and rt forearm. The rash is characterized by blistering, redness and itchiness. She was exposed to nothing. Pertinent negatives include no fever. Treatments tried: benadryl topical and claritin oral. The treatment provided no relief. 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    Family history of diabetes mellitus (DM) 10/7/2013    Former smoker 10/7/2013       Current Outpatient Medications   Medication Sig Dispense Refill    predniSONE (DELTASONE) 20 MG tablet 3 tabs x 1 day, 2 tabs x 3 days, 1 tab po x 3 days, half tab po x 2 days 13 tablet 0    hydrocortisone 2.5 % cream Apply topically 2 times daily sparingly to rash 1 Tube 0    Loratadine (CLARITIN PO) Take 10 mg by mouth      albuterol sulfate HFA (PROAIR HFA) 108 (90 BASE) MCG/ACT inhaler Inhale 2 puffs into the lungs every 6 hours as needed for Wheezing 1 Inhaler 1    brompheniramine-pseudoephedrine-DM 30-2-10 MG/5ML syrup Take 1 - 2tsp. q4-6hrs prn for cough 180 mL 0    methylPREDNISolone (MEDROL DOSEPACK) 4 MG tablet Take as a medrol dose pack. Take by mouth. 1 kit 0    FLUZONE QUADRIVALENT 0.5 ML injection        No current facility-administered medications for this visit.          Allergies   Allergen Reactions    Latex     Aleve [Naproxen Sodium] Other (See Comments)     Asthma attack    Asa [Aspirin]        Subjective:     Review of Systems   Constitutional: Negative for fever. Skin: Positive for rash. All other systems reviewed and are negative. Objective:      Physical Exam   Constitutional: She is oriented to person, place, and time. She appears well-developed and well-nourished. No distress. HENT:   Head: Normocephalic and atraumatic. Mouth/Throat: Oropharynx is clear and moist.   Eyes: Right eye exhibits no discharge. Left eye exhibits no discharge. No scleral icterus. Neck: Normal range of motion. Neck supple. Cardiovascular: Normal rate, regular rhythm and normal heart sounds. No murmur heard. Pulmonary/Chest: Effort normal. No respiratory distress. Abdominal: Soft. Bowel sounds are normal. There is no tenderness. Musculoskeletal: Normal range of motion. She exhibits no edema. Neurological: She is alert and oriented to person, place, and time. No cranial nerve deficit. Skin: Skin is warm and dry. Capillary refill takes less than 2 seconds. Rash (rt forearm has erythematous vesicles in linear distribution, rt side of trunk has one small erythematous papule) noted. She is not diaphoretic. Psychiatric: She has a normal mood and affect. Her behavior is normal.   Nursing note and vitals reviewed. /83 (Site: Left Upper Arm, Position: Sitting, Cuff Size: Large Adult)   Pulse 83   Temp 98.5 °F (36.9 °C)   Ht 5' (1.524 m)   Wt 205 lb (93 kg)   SpO2 96%   BMI 40.04 kg/m²     Assessment:          Diagnosis Orders   1.  Poison ivy  predniSONE (DELTASONE) 20 MG tablet    hydrocortisone 2.5 % cream       Plan:    Avoid scratching  Continue claritin  rx for prednisone and steroid cream  Cool compresses  Recheck for worsening, change or concern  Follow up with primary care in one week         Orders Placed This Encounter   Medications    predniSONE (DELTASONE) 20 MG tablet     Sig: 3 tabs x 1 day, 2 tabs x 3 days, 1 tab po x 3

## 2019-09-17 ENCOUNTER — OFFICE VISIT (OUTPATIENT)
Dept: FAMILY MEDICINE CLINIC | Age: 64
End: 2019-09-17
Payer: COMMERCIAL

## 2019-09-17 VITALS
BODY MASS INDEX: 37.11 KG/M2 | TEMPERATURE: 98.3 F | DIASTOLIC BLOOD PRESSURE: 70 MMHG | HEART RATE: 95 BPM | WEIGHT: 190 LBS | OXYGEN SATURATION: 95 % | SYSTOLIC BLOOD PRESSURE: 135 MMHG

## 2019-09-17 DIAGNOSIS — M79.672 LEFT FOOT PAIN: Primary | ICD-10-CM

## 2019-09-17 PROCEDURE — G8399 PT W/DXA RESULTS DOCUMENT: HCPCS | Performed by: NURSE PRACTITIONER

## 2019-09-17 PROCEDURE — G8427 DOCREV CUR MEDS BY ELIG CLIN: HCPCS | Performed by: NURSE PRACTITIONER

## 2019-09-17 PROCEDURE — 1123F ACP DISCUSS/DSCN MKR DOCD: CPT | Performed by: NURSE PRACTITIONER

## 2019-09-17 PROCEDURE — G8417 CALC BMI ABV UP PARAM F/U: HCPCS | Performed by: NURSE PRACTITIONER

## 2019-09-17 PROCEDURE — 3017F COLORECTAL CA SCREEN DOC REV: CPT | Performed by: NURSE PRACTITIONER

## 2019-09-17 PROCEDURE — 1090F PRES/ABSN URINE INCON ASSESS: CPT | Performed by: NURSE PRACTITIONER

## 2019-09-17 PROCEDURE — 1036F TOBACCO NON-USER: CPT | Performed by: NURSE PRACTITIONER

## 2019-09-17 PROCEDURE — 99214 OFFICE O/P EST MOD 30 MIN: CPT | Performed by: NURSE PRACTITIONER

## 2019-09-17 PROCEDURE — 4040F PNEUMOC VAC/ADMIN/RCVD: CPT | Performed by: NURSE PRACTITIONER

## 2019-09-17 ASSESSMENT — PATIENT HEALTH QUESTIONNAIRE - PHQ9: DEPRESSION UNABLE TO ASSESS: PT REFUSES

## 2019-09-17 NOTE — PROGRESS NOTES
[address] [facility] W[facility]IN FAM[facility] MEDIC[facility]   [address] [place] [address] Drive  Dept: [phone]  Dept Fax: [phone]    Melida Duncan a 70 y.o. female who presents to the urgent care today for her medical conditions/complaintsas noted below. Lucie Moya is c/o of Foot Pain (left foot pain, swelling no known injury )      HPI:     Left foot pain on inside of foot  Denies trauma, injury, redness, fever  Denies hx gout  Denies getting hurt  States it swelled a little  Shoe rubs area and hurts more  Tried resting and ice, not relief  Not getting worse staying same  Aches  Denies knee or ankle pain  Can walk  Has walker and cane at home      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    Family history of diabetes mellitus (DM) 10/7/2013    Former smoker 10/7/2013       Current Outpatient Medications   Medication Sig Dispense Refill    Loratadine (CLARITIN PO) Take 10 mg by mouth      albuterol sulfate HFA (PROAIR HFA) 108 (90 BASE) MCG/ACT inhaler Inhale 2 puffs into the lungs every 6 hours as needed for Wheezing 1 Inhaler 1    predniSONE (DELTASONE) 20 MG tablet 3 tabs x 1 day, 2 tabs x 3 days, 1 tab po x 3 days, half tab po x 2 days (Patient not taking: Reported on 9/17/2019) 13 tablet 0    hydrocortisone 2.5 % cream Apply topically 2 times daily sparingly to rash (Patient not taking: Reported on 9/17/2019) 1 Tube 0    brompheniramine-pseudoephedrine-DM 30-2-10 MG/5ML syrup Take 1 - 2tsp. q4-6hrs prn for cough (Patient not taking: Reported on 9/17/2019) 180 mL 0    methylPREDNISolone (MEDROL DOSEPACK) 4 MG tablet Take as a medrol dose pack. Take by mouth. (Patient not taking: Reported on 9/17/2019) 1 kit 0    FLUZONE QUADRIVALENT 0.5 ML injection        No current facility-administered medications for this visit.          Allergies   Allergen Reactions    Latex     Aleve [Naproxen Sodium] Other (See Comments) Asthma attack   Judith Silverio [Aspirin]        Subjective:     Review of Systems   Musculoskeletal:        Foot pain   All other systems reviewed and are negative. Objective:      Physical Exam   Constitutional: She is oriented to person, place, and time. She appears well-developed and well-nourished. No distress. HENT:   Head: Normocephalic and atraumatic. Eyes: Right eye exhibits no discharge. Left eye exhibits no discharge. No scleral icterus. Neck: Normal range of motion. Neck supple. Cardiovascular: Normal rate, regular rhythm, normal heart sounds and intact distal pulses. Pulmonary/Chest: Effort normal and breath sounds normal. No respiratory distress. Abdominal: Soft. Bowel sounds are normal. There is no tenderness. Musculoskeletal: Normal range of motion. She exhibits tenderness (tender left dorsal medial foot. no erythema or warmth. minimal swelling possibly present. nontender left ankle. normal rom. nvi). Neurological: She is alert and oriented to person, place, and time. No cranial nerve deficit. Skin: Skin is warm and dry. No rash noted. She is not diaphoretic. Psychiatric: She has a normal mood and affect. Her behavior is normal.   Nursing note and vitals reviewed. /70 (Site: Left Upper Arm, Position: Sitting, Cuff Size: Large Adult)   Pulse 95   Temp 98.3 °F (36.8 °C) (Oral)   Wt 190 lb (86.2 kg)   SpO2 95%   BMI 37.11 kg/m²     Assessment:          Diagnosis Orders   1. Left foot pain  XR FOOT LEFT (MIN 3 VIEWS)    Shoe hard sole       Plan:     We will notify you of official xray report when available, may be tomorrow- I see degenerative changes and possible remote fracture, no acute finding seen  Fracture shoe  Rest, ice, elevate  Tylenol as directed  Use your cane or walker  Recheck for worsening change or concern- redness, swelling, worsening  Follow up with your podiatrist tomorrow  Follow up with primary care asap    EXAMINATION:   THREE XRAY VIEWS OF THE LEFT FOOT       9/17/2019 7:25 pm       COMPARISON:   None.       HISTORY:   ORDERING SYSTEM PROVIDED HISTORY: Left foot pain   TECHNOLOGIST PROVIDED HISTORY:   pain       No known injury.       FINDINGS:   There is normal alignment of the left foot.  There is no acute fracture or   dislocation identified.  Calcaneal spurring is noted.           Impression   1. No acute osseous abnormality of the left foot evident. 2. Calcaneal spurring.                      Patient given educational materials - see patientinstructions. Discussed use, benefit, and side effects of prescribed medications. All patient questions answered. Pt voiced understanding.     Electronically signed by VALERIA Clay 9/18/2019 at 11:28 AM

## 2019-09-17 NOTE — PATIENT INSTRUCTIONS
We will notify you of official xray report when available, may be tomorrow- I see degenerative changes and possible remote fracture, no acute finding seen  Fracture shoe  Rest, ice, elevate  Tylenol as directed  Use your cane or walker  Recheck for worsening change or concern- redness, swelling, worsening  Follow up with your podiatrist tomorrow  Follow up with primary care asap  Patient Education        Foot Pain: Care Instructions  Your Care Instructions  Foot injuries that cause pain and swelling are fairly common. Almost all sports or home repair projects can cause a misstep that ends up as foot pain. Normal wear and tear, especially as you get older, also can cause foot pain. Most minor foot injuries will heal on their own, and home treatment is usually all you need to do. If you have a severe injury, you may need tests and treatment. Follow-up care is a key part of your treatment and safety. Be sure to make and go to all appointments, and call your doctor if you are having problems. It's also a good idea to know your test results and keep a list of the medicines you take. How can you care for yourself at home? · Take pain medicines exactly as directed. ? If the doctor gave you a prescription medicine for pain, take it as prescribed. ? If you are not taking a prescription pain medicine, ask your doctor if you can take an over-the-counter medicine. · Rest and protect your foot. Take a break from any activity that may cause pain. · Put ice or a cold pack on your foot for 10 to 20 minutes at a time. Put a thin cloth between the ice and your skin. · Prop up the sore foot on a pillow when you ice it or anytime you sit or lie down during the next 3 days. Try to keep it above the level of your heart. This will help reduce swelling. · Your doctor may recommend that you wrap your foot with an elastic bandage. Keep your foot wrapped for as long as your doctor advises.   · If your doctor recommends crutches, use

## 2019-10-01 ENCOUNTER — OFFICE VISIT (OUTPATIENT)
Dept: PODIATRY | Age: 64
End: 2019-10-01
Payer: COMMERCIAL

## 2019-10-01 VITALS — BODY MASS INDEX: 38.28 KG/M2 | WEIGHT: 195 LBS | HEIGHT: 60 IN

## 2019-10-01 DIAGNOSIS — R60.0 EDEMA OF LOWER EXTREMITY: ICD-10-CM

## 2019-10-01 DIAGNOSIS — M79.672 PAIN IN LEFT FOOT: ICD-10-CM

## 2019-10-01 DIAGNOSIS — S90.32XA CONTUSION OF LEFT FOOT, INITIAL ENCOUNTER: Primary | ICD-10-CM

## 2019-10-01 PROCEDURE — G8427 DOCREV CUR MEDS BY ELIG CLIN: HCPCS | Performed by: PODIATRIST

## 2019-10-01 PROCEDURE — 3017F COLORECTAL CA SCREEN DOC REV: CPT | Performed by: PODIATRIST

## 2019-10-01 PROCEDURE — G8417 CALC BMI ABV UP PARAM F/U: HCPCS | Performed by: PODIATRIST

## 2019-10-01 PROCEDURE — L4360 PNEUMAT WALKING BOOT PRE CST: HCPCS | Performed by: PODIATRIST

## 2019-10-01 PROCEDURE — 99203 OFFICE O/P NEW LOW 30 MIN: CPT | Performed by: PODIATRIST

## 2019-10-01 PROCEDURE — 1123F ACP DISCUSS/DSCN MKR DOCD: CPT | Performed by: PODIATRIST

## 2019-10-01 PROCEDURE — 1036F TOBACCO NON-USER: CPT | Performed by: PODIATRIST

## 2019-10-01 PROCEDURE — G8484 FLU IMMUNIZE NO ADMIN: HCPCS | Performed by: PODIATRIST

## 2019-10-01 PROCEDURE — G8399 PT W/DXA RESULTS DOCUMENT: HCPCS | Performed by: PODIATRIST

## 2019-10-01 PROCEDURE — 4040F PNEUMOC VAC/ADMIN/RCVD: CPT | Performed by: PODIATRIST

## 2019-10-01 PROCEDURE — 1090F PRES/ABSN URINE INCON ASSESS: CPT | Performed by: PODIATRIST

## 2019-10-01 ASSESSMENT — ENCOUNTER SYMPTOMS
SHORTNESS OF BREATH: 0
NAUSEA: 0
DIARRHEA: 0
BACK PAIN: 0
COLOR CHANGE: 0

## 2019-10-24 ENCOUNTER — OFFICE VISIT (OUTPATIENT)
Dept: PODIATRY | Age: 64
End: 2019-10-24
Payer: COMMERCIAL

## 2019-10-24 VITALS — BODY MASS INDEX: 38.28 KG/M2 | WEIGHT: 195 LBS | HEIGHT: 60 IN

## 2019-10-24 DIAGNOSIS — R60.0 EDEMA OF LOWER EXTREMITY: ICD-10-CM

## 2019-10-24 DIAGNOSIS — S90.32XD CONTUSION OF LEFT FOOT, SUBSEQUENT ENCOUNTER: Primary | ICD-10-CM

## 2019-10-24 DIAGNOSIS — M79.672 PAIN IN LEFT FOOT: ICD-10-CM

## 2019-10-24 PROCEDURE — G8399 PT W/DXA RESULTS DOCUMENT: HCPCS | Performed by: PODIATRIST

## 2019-10-24 PROCEDURE — 1090F PRES/ABSN URINE INCON ASSESS: CPT | Performed by: PODIATRIST

## 2019-10-24 PROCEDURE — G8427 DOCREV CUR MEDS BY ELIG CLIN: HCPCS | Performed by: PODIATRIST

## 2019-10-24 PROCEDURE — 4040F PNEUMOC VAC/ADMIN/RCVD: CPT | Performed by: PODIATRIST

## 2019-10-24 PROCEDURE — 1036F TOBACCO NON-USER: CPT | Performed by: PODIATRIST

## 2019-10-24 PROCEDURE — G8417 CALC BMI ABV UP PARAM F/U: HCPCS | Performed by: PODIATRIST

## 2019-10-24 PROCEDURE — 1123F ACP DISCUSS/DSCN MKR DOCD: CPT | Performed by: PODIATRIST

## 2019-10-24 PROCEDURE — 3017F COLORECTAL CA SCREEN DOC REV: CPT | Performed by: PODIATRIST

## 2019-10-24 PROCEDURE — 99213 OFFICE O/P EST LOW 20 MIN: CPT | Performed by: PODIATRIST

## 2019-10-24 PROCEDURE — G8484 FLU IMMUNIZE NO ADMIN: HCPCS | Performed by: PODIATRIST

## 2019-10-24 ASSESSMENT — ENCOUNTER SYMPTOMS
NAUSEA: 0
COLOR CHANGE: 0
SHORTNESS OF BREATH: 0
DIARRHEA: 0
BACK PAIN: 0

## 2021-02-05 ENCOUNTER — OFFICE VISIT (OUTPATIENT)
Dept: FAMILY MEDICINE CLINIC | Age: 66
End: 2021-02-05
Payer: COMMERCIAL

## 2021-02-05 VITALS
WEIGHT: 190 LBS | HEART RATE: 78 BPM | TEMPERATURE: 97.1 F | HEIGHT: 60 IN | OXYGEN SATURATION: 95 % | BODY MASS INDEX: 37.3 KG/M2

## 2021-02-05 DIAGNOSIS — J01.10 ACUTE NON-RECURRENT FRONTAL SINUSITIS: Primary | ICD-10-CM

## 2021-02-05 PROCEDURE — G8427 DOCREV CUR MEDS BY ELIG CLIN: HCPCS | Performed by: NURSE PRACTITIONER

## 2021-02-05 PROCEDURE — G8482 FLU IMMUNIZE ORDER/ADMIN: HCPCS | Performed by: NURSE PRACTITIONER

## 2021-02-05 PROCEDURE — G8417 CALC BMI ABV UP PARAM F/U: HCPCS | Performed by: NURSE PRACTITIONER

## 2021-02-05 PROCEDURE — 1090F PRES/ABSN URINE INCON ASSESS: CPT | Performed by: NURSE PRACTITIONER

## 2021-02-05 PROCEDURE — 4040F PNEUMOC VAC/ADMIN/RCVD: CPT | Performed by: NURSE PRACTITIONER

## 2021-02-05 PROCEDURE — 1123F ACP DISCUSS/DSCN MKR DOCD: CPT | Performed by: NURSE PRACTITIONER

## 2021-02-05 PROCEDURE — 99214 OFFICE O/P EST MOD 30 MIN: CPT | Performed by: NURSE PRACTITIONER

## 2021-02-05 PROCEDURE — 3017F COLORECTAL CA SCREEN DOC REV: CPT | Performed by: NURSE PRACTITIONER

## 2021-02-05 PROCEDURE — G8399 PT W/DXA RESULTS DOCUMENT: HCPCS | Performed by: NURSE PRACTITIONER

## 2021-02-05 PROCEDURE — 1036F TOBACCO NON-USER: CPT | Performed by: NURSE PRACTITIONER

## 2021-02-05 RX ORDER — AMOXICILLIN AND CLAVULANATE POTASSIUM 875; 125 MG/1; MG/1
1 TABLET, FILM COATED ORAL 2 TIMES DAILY
Qty: 20 TABLET | Refills: 0 | Status: SHIPPED | OUTPATIENT
Start: 2021-02-05 | End: 2021-02-15

## 2021-02-05 ASSESSMENT — PATIENT HEALTH QUESTIONNAIRE - PHQ9
SUM OF ALL RESPONSES TO PHQ QUESTIONS 1-9: 0
1. LITTLE INTEREST OR PLEASURE IN DOING THINGS: 0
SUM OF ALL RESPONSES TO PHQ QUESTIONS 1-9: 0
2. FEELING DOWN, DEPRESSED OR HOPELESS: 0

## 2021-02-05 ASSESSMENT — ENCOUNTER SYMPTOMS
SORE THROAT: 0
EYE ITCHING: 0
ALLERGIC/IMMUNOLOGIC NEGATIVE: 1
EYES NEGATIVE: 1
DIARRHEA: 0
CHEST TIGHTNESS: 0
EYE DISCHARGE: 0
SHORTNESS OF BREATH: 0
ABDOMINAL PAIN: 0
VOMITING: 0
COUGH: 0
NAUSEA: 0
SINUS PRESSURE: 1

## 2021-02-05 NOTE — PROGRESS NOTES
[address] [facility]   [address]  Dept: [phone]  Dept Fax: [phone]    Breana Hernandez is a 77 y.o. female who presents today forher medical conditions/complaints as noted below. Breana Hernandez is c/o of   Chief Complaint   Patient presents with    Congestion     possible sinus infection,head congestion x4 days,no covid exposure     HPI:     Sinusitis  This is a new problem. The current episode started in the past 7 days (x's 4 days ). The problem has been gradually improving since onset. Associated symptoms include congestion and sinus pressure. Pertinent negatives include no chills, coughing, headaches, neck pain, shortness of breath or sore throat. Taking Mucinex and Tylenol PRN. Does not want Covid testing today. Tested positive for Covid antibodies on 20.      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    Family history of diabetes mellitus (DM) 10/7/2013    Former smoker 10/7/2013      Past Surgical History:   Procedure Laterality Date    TONSILLECTOMY         Family History   Problem Relation Age of Onset    Diabetes Mother     High Blood Pressure Mother     Diabetes Father     High Blood Pressure Sister     Diabetes Sister     High Blood Pressure Brother     Stroke Brother     Diabetes Brother        Social History     Tobacco Use    Smoking status: Former Smoker     Packs/day: 0.60     Years: 7.00     Pack years: 4.20     Quit date: 1992     Years since quittin.1    Smokeless tobacco: Former User   Substance Use Topics    Alcohol use: No      Current Outpatient Medications   Medication Sig Dispense Refill    amoxicillin-clavulanate (AUGMENTIN) 875-125 MG per tablet Take 1 tablet by mouth 2 times daily for 10 days 20 tablet 0    Loratadine (CLARITIN PO) Take 10 mg by mouth      FLUZONE QUADRIVALENT 0.5 ML injection       albuterol sulfate HFA (PROAIR HFA) 108 (90 BASE) MCG/ACT inhaler Inhale 2 puffs into the lungs every 6 hours as needed for Wheezing 1 Inhaler 1     No current facility-administered medications for this visit. Allergies   Allergen Reactions    Latex     Aleve [Naproxen Sodium] Other (See Comments)     Asthma attack    Asa [Aspirin]            Subjective:      Review of Systems   Constitutional: Negative for appetite change, chills, fatigue and fever. HENT: Positive for congestion, postnasal drip and sinus pressure. Negative for sore throat. Eyes: Negative. Negative for discharge and itching. Respiratory: Negative for cough, chest tightness and shortness of breath. Cardiovascular: Negative for chest pain, palpitations and leg swelling. Gastrointestinal: Negative for abdominal pain, diarrhea, nausea and vomiting. Endocrine: Negative. Genitourinary: Negative for dysuria, frequency and urgency. Musculoskeletal: Negative for neck pain and neck stiffness. Skin: Negative for rash. Allergic/Immunologic: Negative. Neurological: Negative for dizziness, weakness, light-headedness and headaches. Hematological: Negative for adenopathy. Psychiatric/Behavioral: Negative for confusion. Objective:      Physical Exam  Vitals signs reviewed. Constitutional:       Appearance: She is well-developed. HENT:      Head: Normocephalic. Right Ear: External ear normal.      Left Ear: External ear normal.      Nose:      Right Sinus: Maxillary sinus tenderness and frontal sinus tenderness present. Left Sinus: Maxillary sinus tenderness and frontal sinus tenderness present. Eyes:      Conjunctiva/sclera: Conjunctivae normal.      Pupils: Pupils are equal, round, and reactive to light. Neck:      Musculoskeletal: Normal range of motion and neck supple. Cardiovascular:      Rate and Rhythm: Normal rate and regular rhythm.       Heart sounds: Normal heart sounds, S1 normal and S2 normal. No murmur. No friction rub. No gallop. Pulmonary:      Effort: Pulmonary effort is normal. No respiratory distress. Breath sounds: No stridor, decreased air movement or transmitted upper airway sounds. Examination of the right-upper field reveals decreased breath sounds. Examination of the left-upper field reveals decreased breath sounds. Examination of the right-middle field reveals decreased breath sounds. Examination of the left-middle field reveals decreased breath sounds. Examination of the right-lower field reveals decreased breath sounds. Examination of the left-lower field reveals decreased breath sounds. Decreased breath sounds present. Abdominal:      General: Bowel sounds are normal.      Palpations: Abdomen is soft. Musculoskeletal: Normal range of motion. Lymphadenopathy:      Cervical: No cervical adenopathy. Skin:     General: Skin is warm and dry. Findings: No rash. Neurological:      Mental Status: She is alert and oriented to person, place, and time. Cranial Nerves: No cranial nerve deficit. Coordination: Coordination normal.      Deep Tendon Reflexes: Reflexes are normal and symmetric. Psychiatric:         Thought Content: Thought content normal.       Pulse 78   Temp 97.1 °F (36.2 °C) (Infrared)   Ht 5' (1.524 m)   Wt 190 lb (86.2 kg)   SpO2 95%   BMI 37.11 kg/m²     Assessment:       Diagnosis Orders   1. Acute non-recurrent frontal sinusitis  amoxicillin-clavulanate (AUGMENTIN) 875-125 MG per tablet           Plan:     1.) Start Augmentin   2.) Start Mucinex PRN   3.) RTO PRN   4.) Follow-up with PCP     Problem List     None           Patient given educationalmaterials - see patient instructions. Discussed use, benefit, and side effectsof prescribed medications. All patient questions answered. Pt verbalized understanding. Instructed to continue current medications, diet and exercise. Patient agreedwith treatment plan. Follow up as directed. Electronically signed by VALERIA Sanchez CNP on 2/5/2021 at 1:30 PM

## 2021-02-05 NOTE — PATIENT INSTRUCTIONS
Patient Education        Sinusitis: Care Instructions  Your Care Instructions     Sinusitis is an infection of the lining of the sinus cavities in your head. Sinusitis often follows a cold. It causes pain and pressure in your head and face. In most cases, sinusitis gets better on its own in 1 to 2 weeks. But some mild symptoms may last for several weeks. Sometimes antibiotics are needed. Follow-up care is a key part of your treatment and safety. Be sure to make and go to all appointments, and call your doctor if you are having problems. It's also a good idea to know your test results and keep a list of the medicines you take. How can you care for yourself at home? · Take an over-the-counter pain medicine, such as acetaminophen (Tylenol), ibuprofen (Advil, Motrin), or naproxen (Aleve). Read and follow all instructions on the label. · If the doctor prescribed antibiotics, take them as directed. Do not stop taking them just because you feel better. You need to take the full course of antibiotics. · Be careful when taking over-the-counter cold or flu medicines and Tylenol at the same time. Many of these medicines have acetaminophen, which is Tylenol. Read the labels to make sure that you are not taking more than the recommended dose. Too much acetaminophen (Tylenol) can be harmful. · Breathe warm, moist air from a steamy shower, a hot bath, or a sink filled with hot water. Avoid cold, dry air. Using a humidifier in your home may help. Follow the directions for cleaning the machine. · Use saline (saltwater) nasal washes to help keep your nasal passages open and wash out mucus and bacteria. You can buy saline nose drops at a grocery store or drugstore. Or you can make your own at home by adding 1 teaspoon of salt and 1 teaspoon of baking soda to 2 cups of distilled water. If you make your own, fill a bulb syringe with the solution, insert the tip into your nostril, and squeeze gently. [place]on Ape your nose.   · Put a hot, wet towel or a warm gel pack on your face 3 or 4 times a day for 5 to 10 minutes each time. · Try a decongestant nasal spray like oxymetazoline (Afrin). Do not use it for more than 3 days in a row. Using it for more than 3 days can make your congestion worse. When should you call for help? Call your doctor now or seek immediate medical care if:    · You have new or worse swelling or redness in your face or around your eyes.     · You have a new or higher fever. Watch closely for changes in your health, and be sure to contact your doctor if:    · You have new or worse facial pain.     · The mucus from your nose becomes thicker (like pus) or has new blood in it.     · You are not getting better as expected. Where can you learn more? Go to https://[employer]iceweb[employer]. org and sign in to your [employer] account. Enter I321 in the [employer] box to learn more about \"Sinusitis: Care Instructions. \"     If you do not have an account, please click on the \"Sign Up Now\" link. Current as of: April 15, 2020               Content Version: 12.6  © 7099-4192 [employer]. Care instructions adapted under license by [facility] ([facility]). If you have questions about a medical condition or this instruction, always ask your healthcare professional. [address] any warranty or liability for your use of this information.

## 2022-02-26 ENCOUNTER — OFFICE VISIT (OUTPATIENT)
Dept: FAMILY MEDICINE CLINIC | Age: 67
End: 2022-02-26
Payer: COMMERCIAL

## 2022-02-26 VITALS
HEART RATE: 87 BPM | SYSTOLIC BLOOD PRESSURE: 135 MMHG | OXYGEN SATURATION: 98 % | DIASTOLIC BLOOD PRESSURE: 77 MMHG | TEMPERATURE: 97 F

## 2022-02-26 DIAGNOSIS — J01.90 ACUTE BACTERIAL SINUSITIS: Primary | ICD-10-CM

## 2022-02-26 DIAGNOSIS — B96.89 ACUTE BACTERIAL SINUSITIS: Primary | ICD-10-CM

## 2022-02-26 PROCEDURE — 1123F ACP DISCUSS/DSCN MKR DOCD: CPT | Performed by: NURSE PRACTITIONER

## 2022-02-26 PROCEDURE — G8427 DOCREV CUR MEDS BY ELIG CLIN: HCPCS | Performed by: NURSE PRACTITIONER

## 2022-02-26 PROCEDURE — 3017F COLORECTAL CA SCREEN DOC REV: CPT | Performed by: NURSE PRACTITIONER

## 2022-02-26 PROCEDURE — 99213 OFFICE O/P EST LOW 20 MIN: CPT | Performed by: NURSE PRACTITIONER

## 2022-02-26 PROCEDURE — 4040F PNEUMOC VAC/ADMIN/RCVD: CPT | Performed by: NURSE PRACTITIONER

## 2022-02-26 PROCEDURE — G8421 BMI NOT CALCULATED: HCPCS | Performed by: NURSE PRACTITIONER

## 2022-02-26 PROCEDURE — 1090F PRES/ABSN URINE INCON ASSESS: CPT | Performed by: NURSE PRACTITIONER

## 2022-02-26 PROCEDURE — 1036F TOBACCO NON-USER: CPT | Performed by: NURSE PRACTITIONER

## 2022-02-26 PROCEDURE — G8484 FLU IMMUNIZE NO ADMIN: HCPCS | Performed by: NURSE PRACTITIONER

## 2022-02-26 PROCEDURE — G8399 PT W/DXA RESULTS DOCUMENT: HCPCS | Performed by: NURSE PRACTITIONER

## 2022-02-26 RX ORDER — BENZONATATE 100 MG/1
100 CAPSULE ORAL 3 TIMES DAILY PRN
Qty: 21 CAPSULE | Refills: 0 | Status: SHIPPED | OUTPATIENT
Start: 2022-02-26 | End: 2022-03-05

## 2022-02-26 RX ORDER — PREDNISONE 20 MG/1
20 TABLET ORAL 2 TIMES DAILY
Qty: 10 TABLET | Refills: 0 | Status: SHIPPED | OUTPATIENT
Start: 2022-02-26 | End: 2022-03-03

## 2022-02-26 RX ORDER — AMOXICILLIN AND CLAVULANATE POTASSIUM 875; 125 MG/1; MG/1
1 TABLET, FILM COATED ORAL 2 TIMES DAILY
Qty: 20 TABLET | Refills: 0 | Status: SHIPPED | OUTPATIENT
Start: 2022-02-26 | End: 2022-03-08

## 2022-02-26 RX ORDER — FLUTICASONE PROPIONATE 50 MCG
2 SPRAY, SUSPENSION (ML) NASAL DAILY
Qty: 16 G | Refills: 0 | Status: SHIPPED | OUTPATIENT
Start: 2022-02-26

## 2022-02-26 ASSESSMENT — PATIENT HEALTH QUESTIONNAIRE - PHQ9
SUM OF ALL RESPONSES TO PHQ QUESTIONS 1-9: 0
SUM OF ALL RESPONSES TO PHQ QUESTIONS 1-9: 0
SUM OF ALL RESPONSES TO PHQ9 QUESTIONS 1 & 2: 0
SUM OF ALL RESPONSES TO PHQ QUESTIONS 1-9: 0
SUM OF ALL RESPONSES TO PHQ QUESTIONS 1-9: 0
1. LITTLE INTEREST OR PLEASURE IN DOING THINGS: 0
2. FEELING DOWN, DEPRESSED OR HOPELESS: 0
DEPRESSION UNABLE TO ASSESS: FUNCTIONAL CAPACITY MOTIVATION LIMITS ACCURACY

## 2022-02-26 ASSESSMENT — ENCOUNTER SYMPTOMS
RHINORRHEA: 0
WHEEZING: 1
SORE THROAT: 1
HOARSE VOICE: 0
SINUS PRESSURE: 1
HEMOPTYSIS: 0
SWOLLEN GLANDS: 0
SHORTNESS OF BREATH: 1
COUGH: 1
HEARTBURN: 0

## 2022-02-26 NOTE — PROGRESS NOTES
[address] [facility] [address]  Dept: [phone]  Dept Fax: [phone]    Graceann Severs is a 74 y.o. female who presents to the urgent care today for her medical conditions/complaints as notedbelow. Graceann Severs is c/o of Pharyngitis (onset for 2 weeks only when swallowing her left side hurts), Otalgia (left ear hurts radiating down her neck, otc ear drops), and Sinusitis (sinus pressure )      HPI:     74 yr old female presents for Pharyngitis (onset for 2 weeks only when swallowing her left side hurts), Otalgia (left ear hurts radiating down her neck, otc ear drops), and Sinusitis (sinus pressure ). Same sx every year around this time. No fevers, no change in baseline sob. + hx asthma, has noticed wheezing at night. No chest congestion, believes the sinus sx are causing her to cough and make asthma flare. Does not need asthma meds refilled. Cough  This is a new problem. The current episode started 1 to 4 weeks ago (2 weeks). The problem has been waxing and waning. The problem occurs every few minutes. The cough is non-productive. Associated symptoms include ear congestion, ear pain, nasal congestion, postnasal drip, a sore throat, shortness of breath ( no new from baseline) and wheezing. Pertinent negatives include no chest pain, chills, fever, headaches, heartburn, hemoptysis, myalgias, rash, rhinorrhea, sweats or weight loss. The symptoms are aggravated by lying down. She has tried OTC cough suppressant for the symptoms. The treatment provided no relief. Her past medical history is significant for asthma, bronchitis and environmental allergies. Sinusitis  This is a new problem. The current episode started 1 to 4 weeks ago (x2 weeks). The problem has been gradually improving since onset. There has been no fever. The pain is mild.  Associated symptoms include congestion, coughing, ear pain, shortness of breath ( no new from baseline), sinus pressure and a sore throat. Pertinent negatives include no chills, diaphoresis, headaches, hoarse voice, neck pain, sneezing or swollen glands. Past treatments include nothing. The treatment provided no relief. 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    Family history of diabetes mellitus (DM) 10/7/2013    Former smoker 10/7/2013        Current Outpatient Medications   Medication Sig Dispense Refill    benzonatate (TESSALON PERLES) 100 MG capsule Take 1 capsule by mouth 3 times daily as needed for Cough 21 capsule 0    predniSONE (DELTASONE) 20 MG tablet Take 1 tablet by mouth 2 times daily for 5 days 10 tablet 0    amoxicillin-clavulanate (AUGMENTIN) 875-125 MG per tablet Take 1 tablet by mouth 2 times daily for 10 days 20 tablet 0    fluticasone (FLONASE) 50 MCG/ACT nasal spray 2 sprays by Nasal route daily 16 g 0    Loratadine (CLARITIN PO) Take 10 mg by mouth      FLUZONE QUADRIVALENT 0.5 ML injection       albuterol sulfate HFA (PROAIR HFA) 108 (90 BASE) MCG/ACT inhaler Inhale 2 puffs into the lungs every 6 hours as needed for Wheezing 1 Inhaler 1     No current facility-administered medications for this visit. Allergies   Allergen Reactions    Latex     Aleve [Naproxen Sodium] Other (See Comments)     Asthma attack    Asa [Aspirin]        Subjective:      Review of Systems   Constitutional: Negative for chills, diaphoresis, fever and weight loss. HENT: Positive for congestion, ear pain, postnasal drip, sinus pressure and sore throat. Negative for hoarse voice, rhinorrhea and sneezing. Respiratory: Positive for cough, shortness of breath ( no new from baseline) and wheezing. Negative for hemoptysis. Cardiovascular: Negative for chest pain. Gastrointestinal: Negative for heartburn. Musculoskeletal: Negative for myalgias and neck pain. Skin: Negative for rash. Allergic/Immunologic: Positive for environmental allergies. Neurological: Negative for headaches. All other systems reviewed and are negative. 14 systems reviewed and negative except as listed in HPI. Objective:     Physical Exam  Vitals and nursing note reviewed. Constitutional:       General: She is not in acute distress. Appearance: Normal appearance. She is not ill-appearing, toxic-appearing or diaphoretic. Comments: Well appearing   HENT:      Head: Normocephalic and atraumatic. Right Ear: Tympanic membrane, ear canal and external ear normal.      Left Ear: Ear canal and external ear normal.      Ears:      Comments: + middle ear effusion, no injection     Nose: Congestion present. No rhinorrhea. Comments: roma maxillary sinus tenderness  No facial swelling or erythema     Mouth/Throat:      Mouth: Mucous membranes are moist.      Pharynx: No oropharyngeal exudate or posterior oropharyngeal erythema. Comments: + cobblestoning - thin yellow PND  Uvula midline no edema  Handling oral secretions without difficulty  Eyes:      General: No scleral icterus. Right eye: No discharge. Left eye: No discharge. Conjunctiva/sclera: Conjunctivae normal.      Pupils: Pupils are equal, round, and reactive to light. Cardiovascular:      Rate and Rhythm: Normal rate and regular rhythm. Pulses: Normal pulses. Heart sounds: Normal heart sounds. Pulmonary:      Effort: Pulmonary effort is normal. No respiratory distress. Breath sounds: Normal breath sounds. No stridor. No wheezing, rhonchi or rales. Chest:      Chest wall: No tenderness. Abdominal:      General: Bowel sounds are normal.      Palpations: Abdomen is soft. Tenderness: There is no abdominal tenderness. Musculoskeletal:         General: No signs of injury. Cervical back: Normal range of motion and neck supple.       Comments: Ambulated to and from room, gait steady, moving all ext without difficulty   Lymphadenopathy:      Cervical: No cervical adenopathy. Skin:     General: Skin is warm and dry. Capillary Refill: Capillary refill takes less than 2 seconds. Findings: No rash ( no rash to visible skin). Neurological:      General: No focal deficit present. Mental Status: She is alert and oriented to person, place, and time. Psychiatric:         Mood and Affect: Mood normal.         Behavior: Behavior normal.       /77 (Site: Left Upper Arm, Position: Sitting, Cuff Size: Large Adult)   Pulse 87   Temp 97 °F (36.1 °C) (Infrared)   SpO2 98%     Assessment:       Diagnosis Orders   1. Acute bacterial sinusitis         Plan:    sinus sx for 2 weeks, + pnd causing her to cough and flare asthma, no fevers, ls clear t/o and vss  Same sx every year around this time  Based on sx severity and duration, will start antibiotic. Aug rx  flonase rx  Tessalon perles rx  Prednisone rx for asthma flare and middle ear effusion  does not need asthma meds refilled  Reviewed over-the-counter treatments for symptom management. Return for Make an Appt. with your Primary Care in 1 week. Orders Placed This Encounter   Medications    benzonatate (TESSALON PERLES) 100 MG capsule     Sig: Take 1 capsule by mouth 3 times daily as needed for Cough     Dispense:  21 capsule     Refill:  0    predniSONE (DELTASONE) 20 MG tablet     Sig: Take 1 tablet by mouth 2 times daily for 5 days     Dispense:  10 tablet     Refill:  0    amoxicillin-clavulanate (AUGMENTIN) 875-125 MG per tablet     Sig: Take 1 tablet by mouth 2 times daily for 10 days     Dispense:  20 tablet     Refill:  0    fluticasone (FLONASE) 50 MCG/ACT nasal spray     Si sprays by Nasal route daily     Dispense:  16 g     Refill:  0         Patient given educational materials - see patient instructions. Discussed use, benefit, and side effects of prescribed medications. All patient questions answered.   Pt voicedunderstanding.     Electronically signed by VALERIA Pineda CNP on 2/26/2022 at 2:08 PM

## 2022-02-26 NOTE — PATIENT INSTRUCTIONS
Patient Education        Sinusitis: Care Instructions  Your Care Instructions     Sinusitis is an infection of the lining of the sinus cavities in your head. Sinusitis often follows a cold. It causes pain and pressure in your head and face. In most cases, sinusitis gets better on its own in 1 to 2 weeks. But some mild symptoms may last for several weeks. Sometimes antibiotics are needed. Follow-up care is a key part of your treatment and safety. Be sure to make and go to all appointments, and call your doctor if you are having problems. It's also a good idea to know your test results and keep a list of the medicines you take. How can you care for yourself at home? · Take an over-the-counter pain medicine, such as acetaminophen (Tylenol), ibuprofen (Advil, Motrin), or naproxen (Aleve). Read and follow all instructions on the label. · If the doctor prescribed antibiotics, take them as directed. Do not stop taking them just because you feel better. You need to take the full course of antibiotics. · Be careful when taking over-the-counter cold or flu medicines and Tylenol at the same time. Many of these medicines have acetaminophen, which is Tylenol. Read the labels to make sure that you are not taking more than the recommended dose. Too much acetaminophen (Tylenol) can be harmful. · Breathe warm, moist air from a steamy shower, a hot bath, or a sink filled with hot water. Avoid cold, dry air. Using a humidifier in your home may help. Follow the directions for cleaning the machine. · Use saline (saltwater) nasal washes. This can help keep your nasal passages open and wash out mucus and bacteria. You can buy saline nose drops at a grocery store or drugstore. Or you can make your own at home by adding 1 teaspoon of salt and 1 teaspoon of baking soda to 2 cups of distilled water. If you make your own, fill a bulb syringe with the solution, insert the tip into your nostril, and squeeze gently.  Ollie Metzger your nose.  · Put a hot, wet towel or a warm gel pack on your face 3 or 4 times a day for 5 to 10 minutes each time. · Try a decongestant nasal spray like oxymetazoline (Afrin). Do not use it for more than 3 days in a row. Using it for more than 3 days can make your congestion worse. When should you call for help? Call your doctor now or seek immediate medical care if:    · You have new or worse swelling or redness in your face or around your eyes.     · You have a new or higher fever. Watch closely for changes in your health, and be sure to contact your doctor if:    · You have new or worse facial pain.     · The mucus from your nose becomes thicker (like pus) or has new blood in it.     · You are not getting better as expected. Where can you learn more? Go to https://Ed[employer]isphere [employer]iology.[employer]. org and sign in to your [employer] account. Enter Z192 in the Cubeyou box to learn more about \"Sinusitis: Care Instructions. \"     If you do not have an account, please click on the \"Sign Up Now\" link. Current as of: September 8, 2021               Content Version: 13.1  © 2361-2020 "[employer]. Care instructions adapted under license by [facility] ([facility]). If you have questions about a medical condition or this instruction, always ask your healthcare professional. [place] [address] any warranty or liability for your use of this information. Patient Education        Sinusitis: Care Instructions  Your Care Instructions     Sinusitis is an infection of the lining of the sinus cavities in your head. Sinusitis often follows a cold. It causes pain and pressure in your head and face. In most cases, sinusitis gets better on its own in 1 to 2 weeks. But some mild symptoms may last for several weeks. Sometimes antibiotics are needed. Follow-up care is a key part of your treatment and safety.  Be sure to make and go to all appointments, and call your doctor if you are having problems. It's also a good idea to know your test results and keep a list of the medicines you take. How can you care for yourself at home? · Take an over-the-counter pain medicine, such as acetaminophen (Tylenol), ibuprofen (Advil, Motrin), or naproxen (Aleve). Read and follow all instructions on the label. · If the doctor prescribed antibiotics, take them as directed. Do not stop taking them just because you feel better. You need to take the full course of antibiotics. · Be careful when taking over-the-counter cold or flu medicines and Tylenol at the same time. Many of these medicines have acetaminophen, which is Tylenol. Read the labels to make sure that you are not taking more than the recommended dose. Too much acetaminophen (Tylenol) can be harmful. · Breathe warm, moist air from a steamy shower, a hot bath, or a sink filled with hot water. Avoid cold, dry air. Using a humidifier in your home may help. Follow the directions for cleaning the machine. · Use saline (saltwater) nasal washes. This can help keep your nasal passages open and wash out mucus and bacteria. You can buy saline nose drops at a grocery store or drugstore. Or you can make your own at home by adding 1 teaspoon of salt and 1 teaspoon of baking soda to 2 cups of distilled water. If you make your own, fill a bulb syringe with the solution, insert the tip into your nostril, and squeeze gently. Ariana Baldwin your nose. · Put a hot, wet towel or a warm gel pack on your face 3 or 4 times a day for 5 to 10 minutes each time. · Try a decongestant nasal spray like oxymetazoline (Afrin). Do not use it for more than 3 days in a row. Using it for more than 3 days can make your congestion worse. When should you call for help? Call your doctor now or seek immediate medical care if:    · You have new or worse swelling or redness in your face or around your eyes.     · You have a new or higher fever.    Watch closely for changes in your health, and be sure to contact your doctor if:    · You have new or worse facial pain.     · The mucus from your nose becomes thicker (like pus) or has new blood in it.     · You are not getting better as expected. Where can you learn more? Go to https://chpepicrobertaeb.Spotify. org and sign in to your [employer] account. Enter L700 in the [employer]efEdge box to learn more about \"Sinusitis: Care Instructions. \"     If you do not have an account, please click on the \"Sign Up Now\" link. Current as of: September 8, 2021               Content Version: 13.1  © 2006-2021 [employer]. Care instructions adapted under license by [facility] ([facility]). If you have questions about a medical condition or this instruction, always ask your healthcare professional. [address] any warranty or liability for your use of this information. Patient Education        Asthma Attack: Care Instructions  Overview     During an asthma attack, the airways swell and narrow. This makes it hard to breathe. Severe asthma attacks can be dangerous. But you can help prevent these attacks by keeping your asthma under control and treating symptoms before they get bad. Symptoms include being short of breath, having chest tightness, coughing, and wheezing. Noting and treating these symptoms can also help you avoid trips to the emergency room. If you notice any problems or new symptoms, get medical treatment right away. Follow-up care is a key part of your treatment and safety. Be sure to make and go to all appointments, and call your doctor if you are having problems. It's also a good idea to know your test results and keep a list of the medicines you take. How can you care for yourself at home? · Follow your asthma action plan to prevent and treat attacks. If you don't have an asthma action plan, work with your doctor to create one. · Take your asthma medicines exactly as prescribed.  Talk to your doctor right away if you have any questions about how to take them. ? Use your quick-relief medicine when you have symptoms of an asthma attack. Some people need to use quick-relief medicine before they exercise to prevent asthma symptoms. Albuterol is a quick-relief medicine that is often used. In some cases, a certain type of controller inhaler is used as a quick-relief medicine. Ask your doctor what to use for quick relief. ? Take your controller medicine. If you have symptoms often, you will likely need to take it every day. Controller medicine usually includes an inhaled corticosteroid. The goal is to prevent problems before they occur. ? If your doctor prescribed corticosteroid pills to use during an attack, take them exactly as prescribed. It may take hours for the pills to work, but they may make the episode shorter and help you breathe better. ? Keep your quick-relief medicine with you at all times. · Talk to your doctor before using other medicines. Some medicines, such as aspirin, can cause asthma attacks in some people. · If you have a peak flow meter, use it to check how well you are breathing. This can help you predict when an asthma attack is going to occur. Then you can take medicine to prevent the asthma attack or make it less severe. · Do not smoke or allow others to smoke around you. Avoid smoky places. Smoking makes asthma worse. If you need help quitting, talk to your doctor about stop-smoking programs and medicines. These can increase your chances of quitting for good. · Learn what triggers an asthma attack for you, and avoid the triggers when you can. Common triggers include colds, smoke, air pollution, dust, pollen, mold, pets, cockroaches, stress, and cold air. · Avoid colds and the flu. Talk to your doctor about getting a pneumococcal vaccine shot. If you have had one before, ask your doctor if you need a second dose. Get a flu vaccine every fall.  If you must be around people with colds or the flu, wash your hands often. When should you call for help? Call 911 anytime you think you may need emergency care. For example, call if:    · You have severe trouble breathing. Call your doctor now or seek immediate medical care if:    · Your symptoms do not get better after you have followed your asthma action plan.     · You have new or worse trouble breathing.     · Your coughing and wheezing get worse.     · You cough up dark brown or bloody mucus (sputum).     · You have a new or higher fever. Watch closely for changes in your health, and be sure to contact your doctor if:    · You need to use quick-relief medicine on more than 2 days a week within a month (unless it is just for exercise).     · You cough more deeply or more often, especially if you notice more mucus or a change in the color of your mucus.     · You are not getting better as expected. Where can you learn more? Go to https://[employer]ovo. org and sign in to your [employer] account. Enter A809 in the [employer] box to learn more about \"Asthma Attack: Care Instructions. \"     If you do not have an account, please click on the \"Sign Up Now\" link. Current as of: July 6, 2021               Content Version: 13.1  © 2006-2021 [employer]wise, Incorporated. Care instructions adapted under license by [facility] ([facility]). If you have questions about a medical condition or this instruction, always ask your healthcare professional. [place] [address] any warranty or liability for your use of this information.

## 2022-05-01 ENCOUNTER — OFFICE VISIT (OUTPATIENT)
Dept: FAMILY MEDICINE CLINIC | Age: 67
End: 2022-05-01
Payer: COMMERCIAL

## 2022-05-01 VITALS
TEMPERATURE: 96.8 F | BODY MASS INDEX: 40.65 KG/M2 | DIASTOLIC BLOOD PRESSURE: 77 MMHG | WEIGHT: 208.13 LBS | OXYGEN SATURATION: 96 % | SYSTOLIC BLOOD PRESSURE: 126 MMHG | HEART RATE: 95 BPM

## 2022-05-01 DIAGNOSIS — R05.9 COUGH: ICD-10-CM

## 2022-05-01 DIAGNOSIS — R50.9 FEVER, UNSPECIFIED FEVER CAUSE: ICD-10-CM

## 2022-05-01 DIAGNOSIS — J10.1 INFLUENZA A: Primary | ICD-10-CM

## 2022-05-01 DIAGNOSIS — R09.89 SYMPTOMS OF UPPER RESPIRATORY INFECTION (URI): ICD-10-CM

## 2022-05-01 PROBLEM — M79.605 PAIN OF LEFT LOWER EXTREMITY: Status: ACTIVE | Noted: 2017-10-07

## 2022-05-01 PROBLEM — S83.92XA SPRAIN OF LEFT LOWER LEG: Status: ACTIVE | Noted: 2017-10-07

## 2022-05-01 LAB
INFLUENZA A ANTIBODY: POSITIVE
INFLUENZA B ANTIBODY: NEGATIVE
Lab: NORMAL
QC PASS/FAIL: NORMAL
SARS-COV-2 RDRP RESP QL NAA+PROBE: NEGATIVE

## 2022-05-01 PROCEDURE — 87635 SARS-COV-2 COVID-19 AMP PRB: CPT | Performed by: NURSE PRACTITIONER

## 2022-05-01 PROCEDURE — G8427 DOCREV CUR MEDS BY ELIG CLIN: HCPCS | Performed by: NURSE PRACTITIONER

## 2022-05-01 PROCEDURE — 99213 OFFICE O/P EST LOW 20 MIN: CPT | Performed by: NURSE PRACTITIONER

## 2022-05-01 PROCEDURE — 3017F COLORECTAL CA SCREEN DOC REV: CPT | Performed by: NURSE PRACTITIONER

## 2022-05-01 PROCEDURE — 1123F ACP DISCUSS/DSCN MKR DOCD: CPT | Performed by: NURSE PRACTITIONER

## 2022-05-01 PROCEDURE — 4040F PNEUMOC VAC/ADMIN/RCVD: CPT | Performed by: NURSE PRACTITIONER

## 2022-05-01 PROCEDURE — G8417 CALC BMI ABV UP PARAM F/U: HCPCS | Performed by: NURSE PRACTITIONER

## 2022-05-01 PROCEDURE — G8399 PT W/DXA RESULTS DOCUMENT: HCPCS | Performed by: NURSE PRACTITIONER

## 2022-05-01 PROCEDURE — 87804 INFLUENZA ASSAY W/OPTIC: CPT | Performed by: NURSE PRACTITIONER

## 2022-05-01 PROCEDURE — 1036F TOBACCO NON-USER: CPT | Performed by: NURSE PRACTITIONER

## 2022-05-01 PROCEDURE — 1090F PRES/ABSN URINE INCON ASSESS: CPT | Performed by: NURSE PRACTITIONER

## 2022-05-01 RX ORDER — OSELTAMIVIR PHOSPHATE 75 MG/1
75 CAPSULE ORAL 2 TIMES DAILY
Qty: 10 CAPSULE | Refills: 0 | Status: SHIPPED | OUTPATIENT
Start: 2022-05-01 | End: 2022-05-06

## 2022-05-01 RX ORDER — BENZONATATE 100 MG/1
100 CAPSULE ORAL 3 TIMES DAILY PRN
Qty: 21 CAPSULE | Refills: 0 | Status: SHIPPED | OUTPATIENT
Start: 2022-05-01 | End: 2022-05-08

## 2022-05-01 RX ORDER — PREDNISONE 20 MG/1
20 TABLET ORAL 2 TIMES DAILY
Qty: 10 TABLET | Refills: 0 | Status: SHIPPED | OUTPATIENT
Start: 2022-05-01 | End: 2022-05-06

## 2022-05-01 RX ORDER — ONDANSETRON 4 MG/1
4 TABLET, ORALLY DISINTEGRATING ORAL EVERY 6 HOURS PRN
Qty: 15 TABLET | Refills: 0 | Status: SHIPPED | OUTPATIENT
Start: 2022-05-01 | End: 2022-07-18 | Stop reason: ALTCHOICE

## 2022-05-01 RX ORDER — AZITHROMYCIN 250 MG/1
250 TABLET, FILM COATED ORAL SEE ADMIN INSTRUCTIONS
Qty: 6 TABLET | Refills: 0 | Status: SHIPPED | OUTPATIENT
Start: 2022-05-01 | End: 2022-05-06

## 2022-05-01 ASSESSMENT — ENCOUNTER SYMPTOMS
CHANGE IN BOWEL HABIT: 0
SWOLLEN GLANDS: 0
VISUAL CHANGE: 0
FLU SYMPTOMS: 1
NAUSEA: 1
ABDOMINAL PAIN: 0
COUGH: 1
VOMITING: 0
SORE THROAT: 0

## 2022-05-01 NOTE — PROGRESS NOTES
[address] [address]  Dept: [phone]  Dept Fax: [phone]    Rasheeda Dyson is a 74 y.o. female who presents to the urgent care today for her medical conditions/complaints as notedbelow. Rasheeda Dyson is c/o of Cough (onset 2 days ago, dry), Dizziness, Fever, and Chills      HPI:     74 yr old female with hx asthma and chronic sinusitis,  presents for rapid onset dry np cough, fever (up to 102), chills, clear runny nose, dizziness (if gets up fast - no chest pain, heart palpitations) for <48 hrs. Feels sob with exertion. Covid Vaccinated? 3 doses  Flu vax? Yes  Using inhaler more often than usual  Gets pneumonia easily. nonsmoker      Influenza  This is a new problem. The current episode started yesterday (x2d). The problem occurs constantly. The problem has been gradually improving. Associated symptoms include anorexia, chills, congestion, coughing, diaphoresis, fatigue, a fever, headaches, myalgias and nausea. Pertinent negatives include no abdominal pain, arthralgias, change in bowel habit, chest pain, joint swelling, neck pain, numbness, rash, sore throat, swollen glands, urinary symptoms, vertigo, visual change, vomiting or weakness. The symptoms are aggravated by exertion. She has tried acetaminophen (claritin, inhaler, flonase tylenol) for the symptoms. The treatment provided mild relief.        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    Family history of diabetes mellitus (DM) 10/7/2013    Former smoker 10/7/2013        Current Outpatient Medications   Medication Sig Dispense Refill    oseltamivir (TAMIFLU) 75 MG capsule Take 1 capsule by mouth 2 times daily for 5 days 10 capsule 0    predniSONE (DELTASONE) 20 MG tablet Take 1 tablet by mouth 2 times daily for 5 days 10 tablet 0    ondansetron (ZOFRAN ODT) 4 MG disintegrating tablet Take 1 tablet by mouth every 6 hours as needed for Nausea or Vomiting 15 tablet 0    azithromycin (ZITHROMAX) 250 MG tablet Take 1 tablet by mouth See Admin Instructions for 5 days 500mg on day 1 followed by 250mg on days 2 - 5 6 tablet 0    benzonatate (TESSALON PERLES) 100 MG capsule Take 1 capsule by mouth 3 times daily as needed for Cough 21 capsule 0    fluticasone (FLONASE) 50 MCG/ACT nasal spray 2 sprays by Nasal route daily 16 g 0    Loratadine (CLARITIN PO) Take 10 mg by mouth      albuterol sulfate HFA (PROAIR HFA) 108 (90 BASE) MCG/ACT inhaler Inhale 2 puffs into the lungs every 6 hours as needed for Wheezing 1 Inhaler 1    FLUZONE QUADRIVALENT 0.5 ML injection  (Patient not taking: Reported on 5/1/2022)       No current facility-administered medications for this visit. Allergies   Allergen Reactions    Latex     Aleve [Naproxen Sodium] Other (See Comments)     Asthma attack    Asa [Aspirin]        Subjective:      Review of Systems   Constitutional: Positive for chills, diaphoresis, fatigue and fever. HENT: Positive for congestion. Negative for sore throat. Respiratory: Positive for cough. Cardiovascular: Negative for chest pain. Gastrointestinal: Positive for anorexia and nausea. Negative for abdominal pain, change in bowel habit and vomiting. Musculoskeletal: Positive for myalgias. Negative for arthralgias, joint swelling and neck pain. Skin: Negative for rash. Neurological: Positive for headaches. Negative for vertigo, weakness and numbness. All other systems reviewed and are negative. 14 systems reviewed and negative except as listed in HPI. Objective:     Physical Exam  Vitals and nursing note reviewed. Constitutional:       General: She is not in acute distress. Appearance: She is well-developed. She is ill-appearing. She is not toxic-appearing or diaphoretic. Comments: nontoxic   HENT:      Head: Normocephalic and atraumatic. Right Ear: Tympanic membrane, ear canal and external ear normal.      Left Ear: Tympanic membrane, ear canal and external ear normal.      Nose: Congestion and rhinorrhea present. Mouth/Throat:      Mouth: Mucous membranes are moist.      Pharynx: Oropharynx is clear. No oropharyngeal exudate or posterior oropharyngeal erythema. Comments: Swallows without difficulty  Thin white pnd  Eyes:      General: No scleral icterus. Right eye: No discharge. Left eye: No discharge. Extraocular Movements: Extraocular movements intact. Conjunctiva/sclera: Conjunctivae normal.      Pupils: Pupils are equal, round, and reactive to light. Cardiovascular:      Rate and Rhythm: Normal rate and regular rhythm. Pulses: Normal pulses. Heart sounds: Normal heart sounds. Pulmonary:      Effort: Pulmonary effort is normal. No respiratory distress. Breath sounds: No stridor. Wheezing present. No rhonchi or rales. Comments: Few scattered exp wheezes posteriorly - clr with cough  Chest:      Chest wall: No tenderness. Abdominal:      General: Bowel sounds are normal. There is no distension. Palpations: Abdomen is soft. Tenderness: There is no abdominal tenderness. Musculoskeletal:         General: No tenderness or deformity. Normal range of motion. Cervical back: Normal range of motion and neck supple. Lymphadenopathy:      Cervical: No cervical adenopathy. Skin:     General: Skin is warm and dry. Capillary Refill: Capillary refill takes less than 2 seconds. Findings: No rash ( no rash to visible skin). Neurological:      General: No focal deficit present. Mental Status: She is alert and oriented to person, place, and time. Motor: No abnormal muscle tone.       Coordination: Coordination normal.   Psychiatric:         Mood and Affect: Mood normal.         Behavior: Behavior normal.       /77 (Site: Left Upper Arm, Position: Sitting, Cuff Size: Large Adult)   Pulse 95   Temp 96.8 °F (36 °C) (Infrared)   Wt 208 lb 2 oz (94.4 kg)   SpO2 96%   BMI 40.65 kg/m²     Assessment:       Diagnosis Orders   1. Influenza A     2. Symptoms of upper respiratory infection (URI)  POCT COVID-19 Rapid, NAAT    POCT Influenza A/B    azithromycin (ZITHROMAX) 250 MG tablet   3. Fever, unspecified fever cause  POCT COVID-19 Rapid, NAAT    POCT Influenza A/B    azithromycin (ZITHROMAX) 250 MG tablet   4. Cough  azithromycin (ZITHROMAX) 250 MG tablet       Plan:      Results for POC orders placed in visit on 05/01/22   POCT Influenza A/B   Result Value Ref Range    Influenza A Ab positive     Influenza B Ab negative        POCT Rapid covid neg  POCT flu a +  POCT flu B neg  Sx <48 hrs, cough moist but np, hx pneumonia  + flu vax this season  Scattered wheezes, fx moist np cough, fevers  Declines chest xray  xofluza not covered  tamiflu rx  zpak rx  Prednisone rx for asthma flare  zofran rx for nausea this AM  Cont flonase and claritin  Tessalon rx for cough - only take at HS  Return if worse - consider chest xray  Push fluids, keep hydrated  Declines work note    Reviewed over-the-counter treatments for symptom management. Return for Make an Appt. with your Primary Care in 1 week.     Orders Placed This Encounter   Medications    oseltamivir (TAMIFLU) 75 MG capsule     Sig: Take 1 capsule by mouth 2 times daily for 5 days     Dispense:  10 capsule     Refill:  0    predniSONE (DELTASONE) 20 MG tablet     Sig: Take 1 tablet by mouth 2 times daily for 5 days     Dispense:  10 tablet     Refill:  0    ondansetron (ZOFRAN ODT) 4 MG disintegrating tablet     Sig: Take 1 tablet by mouth every 6 hours as needed for Nausea or Vomiting     Dispense:  15 tablet     Refill:  0    azithromycin (ZITHROMAX) 250 MG tablet     Sig: Take 1 tablet by mouth See Admin Instructions for 5 days 500mg on day 1 followed by 250mg on days 2 - 5     Dispense:  6 tablet     Refill:  0  benzonatate (TESSALON PERLES) 100 MG capsule     Sig: Take 1 capsule by mouth 3 times daily as needed for Cough     Dispense:  21 capsule     Refill:  0         Patient given educational materials - see patient instructions. Discussed use, benefit, and side effects of prescribed medications. All patient questions answered. Pt voicedunderstanding.     Electronically signed by VALERIA Sharp CNP on 5/1/2022 at 1:26 PM

## 2022-05-01 NOTE — PATIENT INSTRUCTIONS
Patient Education        Influenza (Flu): Care Instructions  Overview     Influenza (flu) is an infection in the lungs and breathing passages. It is caused by the influenza virus. There are different strains, or types, of the flu virus from year to year. Unlike the common cold, the flu comes on suddenly and the symptoms can be more severe. These symptoms include a cough, congestion, fever, chills, fatigue, aches, and pains. These symptoms may last up to 10 days. Although the flu can make you feel very sick, it usually doesn'tcause serious health problems. Home treatment is usually all you need for flu symptoms. But your doctor may prescribe antiviral medicine to prevent other health problems, such as pneumonia, from developing. The risk of other health problems from the flu is highest for young children (under 2), older adults (over 72), pregnant women,and people with long-term health conditions. Follow-up care is a key part of your treatment and safety. Be sure to make and go to all appointments, and call your doctor if you are having problems. It's also a good idea to know your test results and keep alist of the medicines you take. How can you care for yourself at home?  Get plenty of rest.   Drink plenty of fluids. If you have to limit fluids because of a health problem, talk with your doctor before you increase the amount of fluids you drink.  Take an over-the-counter pain medicine if needed, such as acetaminophen (Tylenol), ibuprofen (Advil, Motrin), or naproxen (Aleve), to relieve fever, headache, and muscle aches. Be safe with medicines. Read and follow all instructions on the label.  No one younger than 20 should take aspirin. It has been linked to Reye syndrome, a serious illness.  Take any prescribed medicine exactly as directed.  Do not smoke. Smoking can make the flu worse. If you need help quitting, talk to your doctor about stop-smoking programs and medicines.  These can increase your chances of quitting for good.  If the skin around your nose and lips becomes sore, put some petroleum jelly (such as Vaseline) on the area.  To ease coughing:  ? Suck on cough drops or plain, hard candy. ? Try an over-the-counter cough or cold medicine. Read and follow all instructions on the label. ? Raise your head at night with an extra pillow. This may help you rest if coughing keeps you awake. To avoid spreading the flu  [facility] your hands regularly, and keep your hands away from your face.  Stay home from school, work, and other public places until you are feeling better and your fever has been gone for at least 24 hours. The fever needs to have gone away on its own without the help of medicine.  Ask people living with you to talk to their doctors about preventing the flu. They may get antiviral medicine to keep from getting the flu from you.  To prevent the flu in the future, get the flu vaccine every fall. Encourage people living with you to get the vaccine.  Cover your mouth when you cough or sneeze. If you can, cough or sneeze into the bend of your elbow, not your hands. When should you call for help? Call 911 anytime you think you may need emergency care. For example, call if:     You have severe trouble breathing.      You have a seizure. Call your doctor now or seek immediate medical care if:     You have new or worse trouble breathing.      You have pain or pressure in your chest or belly.      You have a fever or cough that returns after getting better.      You feel very sleepy, dizzy, or confused.      You are not urinating.      You have severe muscle pain.      You have severe weakness, or you are unsteady.      You have medical conditions that are getting worse. Watch closely for changes in your health, and be sure to contact your doctor if:     You do not get better as expected.      You are having a problem with your medicine. Where can you learn more?   Go to [email or web address] and sign in to your [employer] account. Enter T118 in the [facility] box to learn more about \"Influenza (Flu): Care Instructions. \"     If you do not have an account, please click on the \"Sign Up Now\" link. Current as of: July 6, 2021               Content Version: 13.2  © 2006-2022 [facility], [facility]. Care instructions adapted under license by [facility] ([facility]). If you have questions about a medical condition or this instruction, always ask your healthcare professional. [address] any warranty or liability for your use of this information.

## 2022-06-26 ENCOUNTER — OFFICE VISIT (OUTPATIENT)
Dept: FAMILY MEDICINE CLINIC | Age: 67
End: 2022-06-26
Payer: COMMERCIAL

## 2022-06-26 ENCOUNTER — HOSPITAL ENCOUNTER (OUTPATIENT)
Age: 67
Setting detail: SPECIMEN
Discharge: HOME | End: 2022-06-26

## 2022-06-26 VITALS
OXYGEN SATURATION: 98 % | TEMPERATURE: 98.1 F | SYSTOLIC BLOOD PRESSURE: 129 MMHG | DIASTOLIC BLOOD PRESSURE: 68 MMHG | HEART RATE: 77 BPM

## 2022-06-26 DIAGNOSIS — R68.89 FLU-LIKE SYMPTOMS: Primary | ICD-10-CM

## 2022-06-26 DIAGNOSIS — R68.89 FLU-LIKE SYMPTOMS: ICD-10-CM

## 2022-06-26 DIAGNOSIS — R05.9 COUGH: ICD-10-CM

## 2022-06-26 DIAGNOSIS — B96.89 ACUTE BACTERIAL SINUSITIS: ICD-10-CM

## 2022-06-26 DIAGNOSIS — R06.2 WHEEZING: ICD-10-CM

## 2022-06-26 DIAGNOSIS — J01.90 ACUTE BACTERIAL SINUSITIS: ICD-10-CM

## 2022-06-26 LAB
INFLUENZA A ANTIBODY: NEGATIVE
INFLUENZA B ANTIBODY: NEGATIVE

## 2022-06-26 PROCEDURE — G8427 DOCREV CUR MEDS BY ELIG CLIN: HCPCS | Performed by: NURSE PRACTITIONER

## 2022-06-26 PROCEDURE — 3017F COLORECTAL CA SCREEN DOC REV: CPT | Performed by: NURSE PRACTITIONER

## 2022-06-26 PROCEDURE — 99213 OFFICE O/P EST LOW 20 MIN: CPT | Performed by: NURSE PRACTITIONER

## 2022-06-26 PROCEDURE — 1090F PRES/ABSN URINE INCON ASSESS: CPT | Performed by: NURSE PRACTITIONER

## 2022-06-26 PROCEDURE — 1036F TOBACCO NON-USER: CPT | Performed by: NURSE PRACTITIONER

## 2022-06-26 PROCEDURE — 1123F ACP DISCUSS/DSCN MKR DOCD: CPT | Performed by: NURSE PRACTITIONER

## 2022-06-26 PROCEDURE — 87804 INFLUENZA ASSAY W/OPTIC: CPT | Performed by: NURSE PRACTITIONER

## 2022-06-26 PROCEDURE — G8399 PT W/DXA RESULTS DOCUMENT: HCPCS | Performed by: NURSE PRACTITIONER

## 2022-06-26 PROCEDURE — 96372 THER/PROPH/DIAG INJ SC/IM: CPT | Performed by: NURSE PRACTITIONER

## 2022-06-26 PROCEDURE — G8417 CALC BMI ABV UP PARAM F/U: HCPCS | Performed by: NURSE PRACTITIONER

## 2022-06-26 RX ORDER — METHYLPREDNISOLONE SODIUM SUCCINATE 125 MG/2ML
125 INJECTION, POWDER, LYOPHILIZED, FOR SOLUTION INTRAMUSCULAR; INTRAVENOUS ONCE
Status: DISCONTINUED | OUTPATIENT
Start: 2022-06-26 | End: 2022-06-26

## 2022-06-26 RX ORDER — PREDNISONE 20 MG/1
40 TABLET ORAL DAILY
Qty: 10 TABLET | Refills: 0 | Status: SHIPPED | OUTPATIENT
Start: 2022-06-26 | End: 2022-07-01

## 2022-06-26 RX ORDER — AMOXICILLIN AND CLAVULANATE POTASSIUM 875; 125 MG/1; MG/1
1 TABLET, FILM COATED ORAL 2 TIMES DAILY
Qty: 20 TABLET | Refills: 0 | Status: SHIPPED | OUTPATIENT
Start: 2022-06-26 | End: 2022-07-06

## 2022-06-26 RX ORDER — METHYLPREDNISOLONE SODIUM SUCCINATE 125 MG/2ML
125 INJECTION, POWDER, LYOPHILIZED, FOR SOLUTION INTRAMUSCULAR; INTRAVENOUS ONCE
Status: COMPLETED | OUTPATIENT
Start: 2022-06-26 | End: 2022-06-26

## 2022-06-26 RX ADMIN — METHYLPREDNISOLONE SODIUM SUCCINATE 125 MG: 125 INJECTION, POWDER, LYOPHILIZED, FOR SOLUTION INTRAMUSCULAR; INTRAVENOUS at 11:48

## 2022-06-26 ASSESSMENT — ENCOUNTER SYMPTOMS
SORE THROAT: 1
VOMITING: 1

## 2022-06-26 NOTE — PROGRESS NOTES
Randell Steiner [address] [address]  [address] [facility] [address]  Dept: [phone]  Dept Fax: [phone]    Susanne Mcintosh is a 74 y.o. female who presents today forher medical conditions/complaints as noted below. Susanne Mcintosh is c/o of   Chief Complaint   Patient presents with   Cj Hinton     onset this morning with a sore throat    Pharyngitis    Generalized Body Aches     onset yesterday    Headache     onset Friday with vomitting     HPI:     Pharyngitis  This is a new problem. The current episode started in the past 7 days. The problem occurs constantly. Associated symptoms include headaches, myalgias, a sore throat and vomiting. Pertinent negatives include no abdominal pain, chest pain, chills, congestion, coughing, fatigue, fever, nausea, neck pain, rash or weakness. Associated symptoms comments: Otalgia   . Symptoms started Thursday- HA, vomiting, Muscle aches, cough, nasal congestion. Denies any fever. Having chills. Took Zofran- effective. Denies SOB of wheezing.       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    Family history of diabetes mellitus (DM) 10/7/2013    Former smoker 10/7/2013      Past Surgical History:   Procedure Laterality Date    TONSILLECTOMY       Family History   Problem Relation Age of Onset    Diabetes Mother     High Blood Pressure Mother     Diabetes Father     High Blood Pressure Sister     Diabetes Sister     High Blood Pressure Brother     Stroke Brother     Diabetes Brother      Social History     Tobacco Use    Smoking status: Former Smoker     Packs/day: 0.60     Years: 7.00     Pack years: 4.20     Quit date: 1992     Years since quittin.5    Smokeless tobacco: Former User   Substance Use Topics    Alcohol use: No      Current Outpatient Medications   Medication Sig Dispense Refill    predniSONE (DELTASONE) 20 MG tablet Take 2 tablets by mouth daily for 5 days 10 tablet 0    amoxicillin-clavulanate (AUGMENTIN) 875-125 MG per tablet Take 1 tablet by mouth 2 times daily for 10 days 20 tablet 0    ondansetron (ZOFRAN ODT) 4 MG disintegrating tablet Take 1 tablet by mouth every 6 hours as needed for Nausea or Vomiting 15 tablet 0    fluticasone (FLONASE) 50 MCG/ACT nasal spray 2 sprays by Nasal route daily 16 g 0    Loratadine (CLARITIN PO) Take 10 mg by mouth      albuterol sulfate HFA (PROAIR HFA) 108 (90 BASE) MCG/ACT inhaler Inhale 2 puffs into the lungs every 6 hours as needed for Wheezing 1 Inhaler 1    nirmatrelvir/ritonavir (PAXLOVID) 20 x 150 MG & 10 x 100MG Take 3 tablets (two 150 mg nirmatrelvir and one 100 mg ritonavir tablets) by mouth every 12 hours for 5 days. 30 tablet 0    FLUZONE QUADRIVALENT 0.5 ML injection  (Patient not taking: Reported on 5/1/2022)       No current facility-administered medications for this visit. Allergies   Allergen Reactions    Latex     Aleve [Naproxen Sodium] Other (See Comments)     Asthma attack    Asa [Aspirin]      Subjective:      Review of Systems   Constitutional: Negative for appetite change, chills, fatigue and fever. HENT: Positive for sore throat. Negative for congestion and postnasal drip. Eyes: Negative. Negative for discharge and itching. Respiratory: Negative for cough, chest tightness and shortness of breath. Cardiovascular: Negative for chest pain, palpitations and leg swelling. Gastrointestinal: Positive for vomiting. Negative for abdominal pain, diarrhea and nausea. Endocrine: Negative. Genitourinary: Negative for dysuria, frequency and urgency. Musculoskeletal: Positive for myalgias. Negative for neck pain and neck stiffness. Skin: Negative for rash. Allergic/Immunologic: Negative. Neurological: Positive for headaches. Negative for dizziness, weakness and light-headedness.    Hematological: Negative for adenopathy. Psychiatric/Behavioral: Negative for confusion. Objective:      Physical Exam  Vitals reviewed. Constitutional:       Appearance: She is well-developed. HENT:      Head: Normocephalic. Right Ear: External ear normal. A middle ear effusion is present. Left Ear: External ear normal. A middle ear effusion is present. Nose: Nose normal.      Mouth/Throat:      Pharynx: Posterior oropharyngeal erythema present. Eyes:      Conjunctiva/sclera: Conjunctivae normal.      Pupils: Pupils are equal, round, and reactive to light. Cardiovascular:      Rate and Rhythm: Normal rate and regular rhythm. Heart sounds: Normal heart sounds, S1 normal and S2 normal. No murmur heard. No friction rub. No gallop. Pulmonary:      Effort: Pulmonary effort is normal. No respiratory distress. Breath sounds: Examination of the right-upper field reveals wheezing. Examination of the left-upper field reveals wheezing. Examination of the right-middle field reveals wheezing. Examination of the left-middle field reveals wheezing. Examination of the right-lower field reveals wheezing. Examination of the left-lower field reveals wheezing. Wheezing present. Abdominal:      General: Bowel sounds are normal.      Palpations: Abdomen is soft. Musculoskeletal:         General: Normal range of motion. Cervical back: Normal range of motion and neck supple. Lymphadenopathy:      Head:      Right side of head: Submandibular adenopathy present. Left side of head: Submandibular adenopathy present. Cervical: No cervical adenopathy. Skin:     General: Skin is warm and dry. Findings: No rash. Neurological:      Mental Status: She is alert and oriented to person, place, and time. Cranial Nerves: No cranial nerve deficit. Coordination: Coordination normal.      Deep Tendon Reflexes: Reflexes are normal and symmetric.    Psychiatric:         Thought Content: Thought content normal.       /68 (Site: Left Upper Arm, Position: Sitting, Cuff Size: Large Adult)   Pulse 77   Temp 98.1 °F (36.7 °C) (Infrared)   SpO2 98%     Results for POC orders placed in visit on 06/26/22   POCT Influenza A/B   Result Value Ref Range    Influenza A Ab negative     Influenza B Ab negative      Assessment:       Diagnosis Orders   1. Flu-like symptoms  POCT Influenza A/B    COVID-19   2. Acute bacterial sinusitis  predniSONE (DELTASONE) 20 MG tablet    amoxicillin-clavulanate (AUGMENTIN) 875-125 MG per tablet    methylPREDNISolone sodium (SOLU-MEDROL) injection 125 mg    COVID-19    DISCONTINUED: methylPREDNISolone sodium (SOLU-MEDROL) injection 125 mg   3. Cough  predniSONE (DELTASONE) 20 MG tablet    amoxicillin-clavulanate (AUGMENTIN) 875-125 MG per tablet    methylPREDNISolone sodium (SOLU-MEDROL) injection 125 mg    COVID-19    DISCONTINUED: methylPREDNISolone sodium (SOLU-MEDROL) injection 125 mg   4. Wheezing  predniSONE (DELTASONE) 20 MG tablet    methylPREDNISolone sodium (SOLU-MEDROL) injection 125 mg    COVID-19    DISCONTINUED: methylPREDNISolone sodium (SOLU-MEDROL) injection 125 mg           Plan:     1.) POCT Flu- negative   2.) Covid swab obtained- will send to lab and call with results   3.) Solu-Medrol IM injection given in office   4.) Start Augmentin   5.) Start Prednisone today   6.) Please remain in quarantine while awaiting Covid results   7.) Follow-up with PCP   8.) RTO PRN     Advance Care Planning  People with COVID-19 may have no symptoms, mild symptoms, such as fever, cough, and shortness of breath or they may have more severe illness, developing severe and fatal pneumonia. As a result, Advance Care Planning with attention to naming a health care decision maker (someone you trust to make healthcare decisions for you if you could not speak for yourself) and sharing other health care preferences is important BEFORE a possible health crisis.  Please contact your Primary Care Provider to discuss Advance Care Planning. Preventing the Spread of Coronavirus Disease 2019 in Homes and Residential Communities  For the most recent information go to [email or web address]    Prevention steps for People with confirmed or suspected COVID-19 (including persons under investigation) who do not need to be hospitalized  and   People with confirmed COVID-19 who were hospitalized and determined to be medically stable to go home    Your healthcare provider and public health staff will evaluate whether you can be cared for at home. If it is determined that you do not need to be hospitalized and can be isolated at home, you will be monitored by staff from your local or state health department. You should follow the prevention steps below until a healthcare provider or local or state health department says you can return to your normal activities. Stay home except to get medical care  People who are mildly ill with COVID-19 are able to isolate at home during their illness. You should restrict activities outside your home, except for getting medical care. Do not go to work, school, or public areas. Avoid using public transportation, ride-sharing, or taxis. Separate yourself from other people and animals in your home  People: As much as possible, you should stay in a specific room and away from other people in your home. Also, you should use a separate bathroom, if available. Animals: You should restrict contact with pets and other animals while you are sick with COVID-19, just like you would around other people. Although there have not been reports of pets or other animals becoming sick with COVID-19, it is still recommended that people sick with COVID-19 limit contact with animals until more information is known about the virus. When possible, have another member of your household care for your animals while you are sick.  If you are or pets in your home. After using these items, they should be washed thoroughly with soap and water. Clean all high-touch surfaces everyday  High touch surfaces include counters, tabletops, doorknobs, bathroom fixtures, toilets, phones, keyboards, tablets, and bedside tables. Also, clean any surfaces that may have blood, stool, or body fluids on them. Use a household cleaning spray or wipe, according to the label instructions. Labels contain instructions for safe and effective use of the cleaning product including precautions you should take when applying the product, such as wearing gloves and making sure you have good ventilation during use of the product. Monitor your symptoms  Seek prompt medical attention if your illness is worsening (e.g., difficulty breathing). Before seeking care, call your healthcare provider and tell them that you have, or are being evaluated for, COVID-19. Put on a facemask before you enter the facility. These steps will help the healthcare providers office to keep other people in the office or waiting room from getting infected or exposed. Ask your healthcare provider to call the local or [facility] health department. Persons who are placed under active monitoring or facilitated self-monitoring should follow instructions provided by their local health department or occupational health professionals, as appropriate. When working with your local health department check their available hours. If you have a medical emergency and need to call 911, notify the dispatch personnel that you have, or are being evaluated for COVID-19. If possible, put on a facemask before emergency medical services arrive. Discontinuing home isolation  Patients with confirmed COVID-19 should remain under home isolation precautions until the risk of secondary transmission to others is thought to be low.  The decision to discontinue home isolation precautions should be made on a case-by-case basis, in consultation with healthcare providers and state and local health departments. Problem List     None         Patient given educationalmaterials - see patient instructions. Discussed use, benefit, and side effectsof prescribed medications. All patient questions answered. Pt verbalized understanding. Instructed to continue current medications, diet and exercise. Patient agreedwith treatment plan. Follow up as directed.      Electronically signed by VALERIA Woodard CNP on 6/28/2022 at 11:20 PM

## 2022-06-26 NOTE — PATIENT INSTRUCTIONS
Patient Education        Sinusitis: Care Instructions  Your Care Instructions     Sinusitis is an infection of the lining of the sinus cavities in your head. Sinusitis often follows a cold. It causes pain and pressure in your head andface. In most cases, sinusitis gets better on its own in 1 to 2 weeks. But some mildsymptoms may last for several weeks. Sometimes antibiotics are needed. Follow-up care is a key part of your treatment and safety. Be sure to make and go to all appointments, and call your doctor if you are having problems. It's also a good idea to know your test results and keep alist of the medicines you take. How can you care for yourself at home?  Take an over-the-counter pain medicine, such as acetaminophen (Tylenol), ibuprofen (Advil, Motrin), or naproxen (Aleve). Read and follow all instructions on the label.  If the doctor prescribed antibiotics, take them as directed. Do not stop taking them just because you feel better. You need to take the full course of antibiotics.  Be careful when taking over-the-counter cold or flu medicines and Tylenol at the same time. Many of these medicines have acetaminophen, which is Tylenol. Read the labels to make sure that you are not taking more than the recommended dose. Too much acetaminophen (Tylenol) can be harmful.  Breathe warm, moist air from a steamy shower, a hot bath, or a sink filled with hot water. Avoid cold, dry air. Using a humidifier in your home may help. Follow the directions for cleaning the machine.  Use saline (saltwater) nasal washes. This can help keep your nasal passages open and wash out mucus and bacteria. You can buy saline nose drops at a grocery store or drugstore. Or you can make your own at home by adding 1 teaspoon of salt and 1 teaspoon of baking soda to 2 cups of distilled water. If you make your own, fill a bulb syringe with the solution, insert the tip into your nostril, and squeeze gently. Javon Tyson your nose.    Put a hot, wet towel or a warm gel pack on your face 3 or 4 times a day for 5 to 10 minutes each time.  Try a decongestant nasal spray like oxymetazoline (Afrin). Do not use it for more than 3 days in a row. Using it for more than 3 days can make your congestion worse. When should you call for help? Call your doctor now or seek immediate medical care if:     You have new or worse swelling or redness in your face or around your eyes.      You have a new or higher fever. Watch closely for changes in your health, and be sure to contact your doctor if:     You have new or worse facial pain.      The mucus from your nose becomes thicker (like pus) or has new blood in it.      You are not getting better as expected. Where can you learn more? Go to https://[employer]. org and sign in to your [employer] account. Enter G098 in the [employer] box to learn more about \"Sinusitis: Care Instructions. \"     If you do not have an account, please click on the \"Sign Up Now\" link. Current as of: September 8, 2021               Content Version: 13.3  © 2006-2022 Healthwise, Incorporated. Care instructions adapted under license by [facility] ([facility]). If you have questions about a medical condition or this instruction, always ask your healthcare professional. [address] any warranty or liability for your use of this information.

## 2022-06-27 LAB
SARS-COV-2: ABNORMAL
SARS-COV-2: DETECTED
SOURCE: ABNORMAL

## 2022-06-28 DIAGNOSIS — U07.1 COVID-19: Primary | ICD-10-CM

## 2022-06-28 ASSESSMENT — ENCOUNTER SYMPTOMS
ALLERGIC/IMMUNOLOGIC NEGATIVE: 1
NAUSEA: 0
EYES NEGATIVE: 1
EYE ITCHING: 0
CHEST TIGHTNESS: 0
SHORTNESS OF BREATH: 0
ABDOMINAL PAIN: 0
DIARRHEA: 0
EYE DISCHARGE: 0
COUGH: 0

## 2022-07-18 ENCOUNTER — OFFICE VISIT (OUTPATIENT)
Dept: FAMILY MEDICINE CLINIC | Age: 67
End: 2022-07-18
Payer: COMMERCIAL

## 2022-07-18 VITALS
BODY MASS INDEX: 37.3 KG/M2 | OXYGEN SATURATION: 98 % | SYSTOLIC BLOOD PRESSURE: 128 MMHG | TEMPERATURE: 97.9 F | HEART RATE: 73 BPM | DIASTOLIC BLOOD PRESSURE: 78 MMHG | HEIGHT: 60 IN | WEIGHT: 190 LBS

## 2022-07-18 DIAGNOSIS — L23.7 CONTACT DERMATITIS DUE TO POISON SUMAC: Primary | ICD-10-CM

## 2022-07-18 PROCEDURE — 1123F ACP DISCUSS/DSCN MKR DOCD: CPT | Performed by: NURSE PRACTITIONER

## 2022-07-18 PROCEDURE — G8427 DOCREV CUR MEDS BY ELIG CLIN: HCPCS | Performed by: NURSE PRACTITIONER

## 2022-07-18 PROCEDURE — G8399 PT W/DXA RESULTS DOCUMENT: HCPCS | Performed by: NURSE PRACTITIONER

## 2022-07-18 PROCEDURE — G8417 CALC BMI ABV UP PARAM F/U: HCPCS | Performed by: NURSE PRACTITIONER

## 2022-07-18 PROCEDURE — 1090F PRES/ABSN URINE INCON ASSESS: CPT | Performed by: NURSE PRACTITIONER

## 2022-07-18 PROCEDURE — 99213 OFFICE O/P EST LOW 20 MIN: CPT | Performed by: NURSE PRACTITIONER

## 2022-07-18 PROCEDURE — 96372 THER/PROPH/DIAG INJ SC/IM: CPT | Performed by: NURSE PRACTITIONER

## 2022-07-18 PROCEDURE — 3017F COLORECTAL CA SCREEN DOC REV: CPT | Performed by: NURSE PRACTITIONER

## 2022-07-18 PROCEDURE — 1036F TOBACCO NON-USER: CPT | Performed by: NURSE PRACTITIONER

## 2022-07-18 RX ORDER — TRIAMCINOLONE ACETONIDE 40 MG/ML
40 INJECTION, SUSPENSION INTRA-ARTICULAR; INTRAMUSCULAR ONCE
Status: COMPLETED | OUTPATIENT
Start: 2022-07-18 | End: 2022-07-18

## 2022-07-18 RX ADMIN — TRIAMCINOLONE ACETONIDE 40 MG: 40 INJECTION, SUSPENSION INTRA-ARTICULAR; INTRAMUSCULAR at 09:05

## 2022-07-18 SDOH — ECONOMIC STABILITY: FOOD INSECURITY: WITHIN THE PAST 12 MONTHS, THE FOOD YOU BOUGHT JUST DIDN'T LAST AND YOU DIDN'T HAVE MONEY TO GET MORE.: NEVER TRUE

## 2022-07-18 SDOH — ECONOMIC STABILITY: FOOD INSECURITY: WITHIN THE PAST 12 MONTHS, YOU WORRIED THAT YOUR FOOD WOULD RUN OUT BEFORE YOU GOT MONEY TO BUY MORE.: NEVER TRUE

## 2022-07-18 ASSESSMENT — ENCOUNTER SYMPTOMS
VOMITING: 0
RHINORRHEA: 0
DIARRHEA: 0
SORE THROAT: 0
COUGH: 0
SHORTNESS OF BREATH: 0

## 2022-07-18 ASSESSMENT — SOCIAL DETERMINANTS OF HEALTH (SDOH): HOW HARD IS IT FOR YOU TO PAY FOR THE VERY BASICS LIKE FOOD, HOUSING, MEDICAL CARE, AND HEATING?: NOT HARD AT ALL

## 2022-07-18 NOTE — PROGRESS NOTES
[address] [address]  Dept: [phone]  Dept Fax: [phone]    Sofia Fernandez is a 74 y.o. female who presents to the urgent care today for her medical conditions/complaints as notedbelow. Sofia Fernandez is c/o of Rash (Onset Friday, bilateral arms, itchy)      HPI:     74 yr old female presents for itchy rash to roma arms after gardening. Known exposure to poison sumac. No fevers, chills, sob. Rash  This is a new problem. The current episode started in the past 7 days (x3d). The problem has been gradually worsening since onset. The affected locations include the right arm and left arm. The rash is characterized by itchiness and blistering. She was exposed to plant contact. Pertinent negatives include no anorexia, congestion, cough, diarrhea, facial edema, fatigue, fever, rhinorrhea, shortness of breath, sore throat or vomiting. Past treatments include antihistamine. The treatment provided no relief.      Past Medical History:   Diagnosis Date    Asthma 10/7/2013    Chronic rhinosinusitis 10/7/2013    Family history of diabetes mellitus (DM) 10/7/2013    Former smoker 10/7/2013        Current Outpatient Medications   Medication Sig Dispense Refill    triamcinolone (KENALOG) 0.1 % ointment Apply topically 2 times daily for 7 days 1 each 1    fluticasone (FLONASE) 50 MCG/ACT nasal spray 2 sprays by Nasal route daily 16 g 0    Loratadine (CLARITIN PO) Take 10 mg by mouth      albuterol sulfate HFA (PROAIR HFA) 108 (90 BASE) MCG/ACT inhaler Inhale 2 puffs into the lungs every 6 hours as needed for Wheezing 1 Inhaler 1    FLUZONE QUADRIVALENT 0.5 ML injection  (Patient not taking: No sig reported)       Current Facility-Administered Medications   Medication Dose Route Frequency Provider Last Rate Last Admin    triamcinolone acetonide (KENALOG-40) injection 40 mg  40 mg IntraMUSCular Once Aretha Padilla, APRN - CNP         Allergies   Allergen Reactions    Latex     Aleve [Naproxen Sodium] Other (See Comments)     Asthma attack    Asa [Aspirin]        Subjective:      Review of Systems   Constitutional:  Negative for fatigue and fever. HENT:  Negative for congestion, rhinorrhea and sore throat. Respiratory:  Negative for cough and shortness of breath. Gastrointestinal:  Negative for anorexia, diarrhea and vomiting. Skin:  Positive for rash. All other systems reviewed and are negative. 14 systems reviewed and negative except as listed in HPI. Objective:     Physical Exam  Vitals and nursing note reviewed. Constitutional:       General: She is not in acute distress. Appearance: Normal appearance. She is not ill-appearing, toxic-appearing or diaphoretic. HENT:      Head: Normocephalic and atraumatic. Right Ear: External ear normal.      Left Ear: External ear normal.      Mouth/Throat:      Mouth: Mucous membranes are moist.      Pharynx: Oropharynx is clear. No oropharyngeal exudate or posterior oropharyngeal erythema. Comments: No tongue elevation  Swallows without difficulty  Uvula midline no edema  No oropharyngeal swelling  Eyes:      General: No scleral icterus. Right eye: No discharge. Left eye: No discharge. Extraocular Movements: Extraocular movements intact. Conjunctiva/sclera: Conjunctivae normal.      Pupils: Pupils are equal, round, and reactive to light. Cardiovascular:      Rate and Rhythm: Normal rate and regular rhythm. Pulses: Normal pulses. Heart sounds: Normal heart sounds. Pulmonary:      Effort: Pulmonary effort is normal. No respiratory distress. Breath sounds: Normal breath sounds. No stridor. No wheezing, rhonchi or rales. Musculoskeletal:         General: No signs of injury. Cervical back: Neck supple.       Comments: Ambulated to and from room, gait is steady, moving all extremities without difficulty   Skin:     General: Skin is warm and dry. Capillary Refill: Capillary refill takes less than 2 seconds. Findings: Rash present. Comments: Moderate Pruritic maculopapular rash, some in linear fashion, some clear fluid filled blisters, to roma arms   Neurological:      General: No focal deficit present. Mental Status: She is alert and oriented to person, place, and time. Psychiatric:         Mood and Affect: Mood normal.         Behavior: Behavior normal.     /78 (Site: Left Upper Arm, Position: Sitting, Cuff Size: Large Adult)   Pulse 73   Temp 97.9 °F (36.6 °C) (Infrared)   Ht 5' (1.524 m)   Wt 190 lb (86.2 kg)   SpO2 98%   BMI 37.11 kg/m²     Assessment:       Diagnosis Orders   1. Contact dermatitis due to poison sumac  triamcinolone acetonide (KENALOG-40) injection 40 mg    triamcinolone (KENALOG) 0.1 % ointment          Plan: Moderate rash roma arms with blistering s/p known exposure to poison sumac  Kenalog oint rx  Kenalog IM here    Reviewed over-the-counter treatments for symptom management. Return if symptoms worsen or fail to improve, for Make an Appt. with your Primary Care in 1 week. Orders Placed This Encounter   Medications    triamcinolone acetonide (KENALOG-40) injection 40 mg    triamcinolone (KENALOG) 0.1 % ointment     Sig: Apply topically 2 times daily for 7 days     Dispense:  1 each     Refill:  1           Patient given educational materials - see patient instructions. Discussed use, benefit, and side effects of prescribed medications. All patient questions answered. Pt voicedunderstanding.     Electronically signed by VALERIA Richards CNP on 7/18/2022 at 8:57 AM

## 2022-08-12 ENCOUNTER — OFFICE VISIT (OUTPATIENT)
Dept: FAMILY MEDICINE CLINIC | Age: 67
End: 2022-08-12
Payer: COMMERCIAL

## 2022-08-12 VITALS
SYSTOLIC BLOOD PRESSURE: 122 MMHG | WEIGHT: 212.2 LBS | TEMPERATURE: 97.8 F | HEART RATE: 74 BPM | OXYGEN SATURATION: 96 % | DIASTOLIC BLOOD PRESSURE: 76 MMHG | BODY MASS INDEX: 41.44 KG/M2 | RESPIRATION RATE: 16 BRPM

## 2022-08-12 DIAGNOSIS — R06.02 SOB (SHORTNESS OF BREATH): ICD-10-CM

## 2022-08-12 DIAGNOSIS — E66.01 MORBID OBESITY (HCC): Primary | ICD-10-CM

## 2022-08-12 DIAGNOSIS — Z12.31 SCREENING MAMMOGRAM FOR BREAST CANCER: ICD-10-CM

## 2022-08-12 DIAGNOSIS — L25.9 CONTACT DERMATITIS, UNSPECIFIED CONTACT DERMATITIS TYPE, UNSPECIFIED TRIGGER: ICD-10-CM

## 2022-08-12 DIAGNOSIS — R05.9 COUGH: ICD-10-CM

## 2022-08-12 DIAGNOSIS — Z78.0 POSTMENOPAUSE: ICD-10-CM

## 2022-08-12 DIAGNOSIS — Z12.11 SCREENING FOR COLON CANCER: ICD-10-CM

## 2022-08-12 PROCEDURE — 1123F ACP DISCUSS/DSCN MKR DOCD: CPT | Performed by: NURSE PRACTITIONER

## 2022-08-12 PROCEDURE — 3017F COLORECTAL CA SCREEN DOC REV: CPT | Performed by: NURSE PRACTITIONER

## 2022-08-12 PROCEDURE — 1090F PRES/ABSN URINE INCON ASSESS: CPT | Performed by: NURSE PRACTITIONER

## 2022-08-12 PROCEDURE — 99204 OFFICE O/P NEW MOD 45 MIN: CPT | Performed by: NURSE PRACTITIONER

## 2022-08-12 PROCEDURE — G8427 DOCREV CUR MEDS BY ELIG CLIN: HCPCS | Performed by: NURSE PRACTITIONER

## 2022-08-12 PROCEDURE — G8417 CALC BMI ABV UP PARAM F/U: HCPCS | Performed by: NURSE PRACTITIONER

## 2022-08-12 PROCEDURE — 1036F TOBACCO NON-USER: CPT | Performed by: NURSE PRACTITIONER

## 2022-08-12 PROCEDURE — G8399 PT W/DXA RESULTS DOCUMENT: HCPCS | Performed by: NURSE PRACTITIONER

## 2022-08-12 RX ORDER — BENZONATATE 100 MG/1
100 CAPSULE ORAL 3 TIMES DAILY PRN
Qty: 30 CAPSULE | Refills: 0 | Status: SHIPPED | OUTPATIENT
Start: 2022-08-12 | End: 2022-08-22

## 2022-08-12 RX ORDER — METHYLPREDNISOLONE 4 MG/1
TABLET ORAL
Qty: 1 KIT | Refills: 0 | Status: SHIPPED | OUTPATIENT
Start: 2022-08-12 | End: 2022-08-18

## 2022-08-12 RX ORDER — ACETAMINOPHEN 325 MG/1
325 TABLET ORAL EVERY 6 HOURS PRN
COMMUNITY

## 2022-08-12 NOTE — PROGRESS NOTES
Subjective:      Patient ID: Janet Dougherty is a 74 y.o. female. Visit Information    Have you changed or started any medications since your last visit including any over-the-counter medicines, vitamins, or herbal medicines? no   Are you having any side effects from any of your medications? -  no  Have you stopped taking any of your medications? Is so, why? -  no    Have you seen any other physician or provider since your last visit? Yes - Records Obtained  Have you had any other diagnostic tests since your last visit? No  Have you been seen in the emergency room and/or had an admission to a hospital since we last saw you? No  Have you had your routine dental cleaning in the past 6 months? no    Have you activated your [employer] account? If not, what are your barriers? No: Not interested     Patient Care Team:  Eber Vallecillo MD as PCP - General (Family Medicine)  Eber Vallecillo MD as PCP - [facility]    Medical History Review  Past Medical, Family, and Social History reviewed and does contribute to the patient presenting condition    Health Maintenance   Topic Date Due    Hepatitis C screen  Never done    Diabetes screen  Never done    Breast cancer screen  Never done    Shingles vaccine (1 of 2) Never done    Colorectal Cancer Screen  04/03/2018    COVID-19 Vaccine (4 - Booster for Lynn Peter series) 01/26/2022    Lipids  04/01/2022    Flu vaccine (1) 09/01/2022    Depression Screen  02/26/2023    DTaP/Tdap/Td vaccine (3 - Td or Tdap) 04/28/2032    DEXA (modify frequency per FRAX score)  Completed    Pneumococcal 65+ years Vaccine  Completed    Hepatitis A vaccine  Aged Out    Hepatitis B vaccine  Aged Out    Hib vaccine  Aged Out    Meningococcal (ACWY) vaccine  Aged Out       HPI no pcp for years. 74year old female presents with management of morbid obesity cough sob and contact dermatitis with medication refills. States she doesn't watch diet or exercise due to arthritis.  Currently is not on bp or cholesterol medications. Bp is stable today. Has had cough productive with whitish sputum for years and has exertional sob. Pulsox is 96%Denies fever chills chest pain palpitations dizziness or nv abd pain. Pt is a remote smoker and quit it years ago. Also c/o poison ivy dermatitis in right arm for a week and was evaluated in urgent care. States rash is not going away completely after oral steroids. Review of Systems   Constitutional:  Negative for chills and fever. Eyes:  Negative for visual disturbance. Respiratory:  Positive for cough and shortness of breath. Cardiovascular:  Negative for chest pain and palpitations. Gastrointestinal:  Negative for abdominal pain and blood in stool. Endocrine: Negative for polydipsia and polyuria. Genitourinary:  Negative for dysuria and hematuria. Musculoskeletal:  Positive for arthralgias and gait problem. Skin:  Positive for rash. Neurological:  Negative for dizziness and numbness. Psychiatric/Behavioral:  Negative for agitation and behavioral problems. Objective:   Physical Exam  Vitals and nursing note reviewed. Constitutional:       General: She is not in acute distress. Appearance: Normal appearance. She is obese. HENT:      Nose: Nose normal.   Eyes:      Conjunctiva/sclera: Conjunctivae normal.   Cardiovascular:      Rate and Rhythm: Normal rate and regular rhythm. Heart sounds: Normal heart sounds. Pulmonary:      Effort: Pulmonary effort is normal. No respiratory distress. Breath sounds: No wheezing. Comments: Diminished lung sounds throughout   Abdominal:      Palpations: Abdomen is soft. Tenderness: There is no abdominal tenderness. Musculoskeletal:         General: Normal range of motion. Cervical back: Neck supple. Lymphadenopathy:      Cervical: No cervical adenopathy.    Skin:     Comments: Scattered healing papules in linear pattern,  right arm, pruritic    Neurological:      Mental Status: She is alert and oriented to person, place, and time. Cranial Nerves: No cranial nerve deficit. Psychiatric:         Mood and Affect: Mood normal.         Behavior: Behavior normal.       Assessment:      1. Morbid obesity ([facility] [address]    2. Cough    3. SOB (shortness of breath)    4. Contact dermatitis, unspecified contact dermatitis type, unspecified trigger    5. Postmenopause    6. Screening mammogram for breast cancer    7. Screening for colon cancer            Plan:      BP Readings from Last 3 Encounters:   08/12/22 122/76   07/18/22 128/78   06/26/22 129/68     /76 (Site: Right Upper Arm, Position: Sitting, Cuff Size: Large Adult)   Pulse 74   Temp 97.8 °F (36.6 °C) (Infrared)   Resp 16   Wt 212 lb 3.2 oz (96.3 kg)   SpO2 96%   BMI 41.44 kg/m²   Lab Results   Component Value Date    CHOL 193 04/01/2017    TRIG 138 04/01/2017    HDL 44 04/01/2017     No results found for: CALCIUM, PHOS  Lab Results   Component Value Date    LDLCALC 121 04/01/2017         1. Morbid obesity ([facility] [address]  -cont diet measures. - Lipid Panel; Future  - Comprehensive Metabolic Panel; Future  - Hemoglobin A1C; Future    2. Cough/ 3. SOB (shortness of breath)  - XR CHEST STANDARD (2 VW); Future  - CBC with Auto Differential; Future  - benzonatate (TESSALON) 100 MG capsule; Take 1 capsule by mouth 3 times daily as needed for Cough  Dispense: 30 capsule; Refill: 0  - methylPREDNISolone (MEDROL DOSEPACK) 4 MG tablet; Take by mouth. Dispense: 1 kit; Refill: 0  - Handicap Placard MISC; by Does not apply route Valid for 6 months  Dispense: 1 each; Refill: 0    4. Contact dermatitis, unspecified contact dermatitis type, unspecified trigger  - cont topical triamcinolone cream bid and taper off     5. Postmenopause  - DEXA BONE DENSITY AXIAL SKELETON; Future    6. screening mammogram for breast cancer  - INDIO DIGITAL SCREEN W OR WO CAD BILATERAL; Future    7.  Screening for colon cancer  - Fecal DNA Colorectal cancer screening ([place])      Requested Prescriptions     Signed Prescriptions Disp Refills    benzonatate (TESSALON) 100 MG capsule 30 capsule 0     Sig: Take 1 capsule by mouth 3 times daily as needed for Cough    methylPREDNISolone (MEDROL DOSEPACK) 4 MG tablet 1 kit 0     Sig: Take by mouth. Handicap Placard MISC 1 each 0     Sig: by Does not apply route Dx: COPD     Valid for 6 months       There are no discontinued medications. Discussed use, benefit, and side effects of prescribed medications. Barriers to medication compliance addressed. All patient questions answered. Pt voiced understanding. Return in about 6 months (around 2/12/2023).           Michelle Bishop, VALERIA - CNP

## 2022-08-14 PROBLEM — E66.01 MORBID OBESITY (HCC): Status: ACTIVE | Noted: 2022-08-14

## 2022-08-14 ASSESSMENT — ENCOUNTER SYMPTOMS
ABDOMINAL PAIN: 0
COUGH: 1
BLOOD IN STOOL: 0
SHORTNESS OF BREATH: 1

## 2022-08-17 LAB
ALBUMIN SERPL-MCNC: 3.9 G/DL
ALP BLD-CCNC: 104 U/L
ALT SERPL-CCNC: 14 U/L
ANION GAP SERPL CALCULATED.3IONS-SCNC: 9 MMOL/L
AST SERPL-CCNC: 10 U/L
AVERAGE GLUCOSE: 131
BASOPHILS ABSOLUTE: 0.1 /ΜL
BASOPHILS RELATIVE PERCENT: 0.6 %
BILIRUB SERPL-MCNC: 0.4 MG/DL (ref 0.1–1.4)
BUN BLDV-MCNC: 19 MG/DL
CALCIUM SERPL-MCNC: 8.4 MG/DL
CHLORIDE BLD-SCNC: 106 MMOL/L
CHOLESTEROL, TOTAL: 254 MG/DL
CHOLESTEROL/HDL RATIO: 3.2
CO2: 31 MMOL/L
CREAT SERPL-MCNC: 0.82 MG/DL
EOSINOPHILS ABSOLUTE: 0.1 /ΜL
EOSINOPHILS RELATIVE PERCENT: 1.5 %
GFR CALCULATED: 78
GLUCOSE BLD-MCNC: 93 MG/DL
HBA1C MFR BLD: 6.2 %
HCT VFR BLD CALC: 38.4 % (ref 36–46)
HDLC SERPL-MCNC: 80 MG/DL (ref 35–70)
HEMOGLOBIN: 12.7 G/DL (ref 12–16)
LDL CHOLESTEROL CALCULATED: 137 MG/DL (ref 0–160)
LYMPHOCYTES ABSOLUTE: 3.1 /ΜL
LYMPHOCYTES RELATIVE PERCENT: 35.5 %
MCH RBC QN AUTO: 28.1 PG
MCHC RBC AUTO-ENTMCNC: 33.1 G/DL
MCV RBC AUTO: 85 FL
MONOCYTES ABSOLUTE: 0.7 /ΜL
MONOCYTES RELATIVE PERCENT: 7.7 %
NEUTROPHILS ABSOLUTE: 4.7 /ΜL
NEUTROPHILS RELATIVE PERCENT: 54.7 %
NONHDLC SERPL-MCNC: ABNORMAL MG/DL
PDW BLD-RTO: 14 %
PLATELET # BLD: 256 K/ΜL
PMV BLD AUTO: 7.4 FL
POTASSIUM SERPL-SCNC: 3.8 MMOL/L
RBC # BLD: 4.52 10^6/ΜL
SODIUM BLD-SCNC: 146 MMOL/L
TOTAL PROTEIN: 6.4
TRIGL SERPL-MCNC: 183 MG/DL
VLDLC SERPL CALC-MCNC: 37 MG/DL
WBC # BLD: 8.6 10^3/ML

## 2022-08-19 DIAGNOSIS — R05.9 COUGH: ICD-10-CM

## 2022-08-19 DIAGNOSIS — E66.01 MORBID OBESITY (HCC): ICD-10-CM

## 2022-10-11 ENCOUNTER — OFFICE VISIT (OUTPATIENT)
Dept: FAMILY MEDICINE CLINIC | Age: 67
End: 2022-10-11
Payer: COMMERCIAL

## 2022-10-11 ENCOUNTER — HOSPITAL ENCOUNTER (OUTPATIENT)
Age: 67
Setting detail: SPECIMEN
Discharge: HOME | End: 2022-10-11

## 2022-10-11 VITALS
DIASTOLIC BLOOD PRESSURE: 80 MMHG | TEMPERATURE: 97.4 F | BODY MASS INDEX: 39.27 KG/M2 | HEART RATE: 94 BPM | OXYGEN SATURATION: 96 % | HEIGHT: 60 IN | SYSTOLIC BLOOD PRESSURE: 137 MMHG | WEIGHT: 200 LBS

## 2022-10-11 DIAGNOSIS — R39.9 UTI SYMPTOMS: ICD-10-CM

## 2022-10-11 DIAGNOSIS — J02.9 SORE THROAT: ICD-10-CM

## 2022-10-11 DIAGNOSIS — R30.0 DYSURIA: Primary | ICD-10-CM

## 2022-10-11 DIAGNOSIS — R09.89 SYMPTOMS OF UPPER RESPIRATORY INFECTION (URI): ICD-10-CM

## 2022-10-11 LAB
BILIRUBIN, POC: NORMAL
BLOOD URINE, POC: NEGATIVE
CLARITY, POC: CLEAR
COLOR, POC: YELLOW
GLUCOSE URINE, POC: NORMAL
KETONES, POC: NEGATIVE
LEUKOCYTE EST, POC: NEGATIVE
NITRITE, POC: NEGATIVE
PH, POC: 5.5
PROTEIN, POC: NEGATIVE
S PYO AG THROAT QL: NORMAL
SPECIFIC GRAVITY, POC: >=1.03
UROBILINOGEN, POC: NORMAL

## 2022-10-11 PROCEDURE — 1123F ACP DISCUSS/DSCN MKR DOCD: CPT | Performed by: NURSE PRACTITIONER

## 2022-10-11 PROCEDURE — G8427 DOCREV CUR MEDS BY ELIG CLIN: HCPCS | Performed by: NURSE PRACTITIONER

## 2022-10-11 PROCEDURE — 1090F PRES/ABSN URINE INCON ASSESS: CPT | Performed by: NURSE PRACTITIONER

## 2022-10-11 PROCEDURE — G8399 PT W/DXA RESULTS DOCUMENT: HCPCS | Performed by: NURSE PRACTITIONER

## 2022-10-11 PROCEDURE — G8417 CALC BMI ABV UP PARAM F/U: HCPCS | Performed by: NURSE PRACTITIONER

## 2022-10-11 PROCEDURE — 87880 STREP A ASSAY W/OPTIC: CPT | Performed by: NURSE PRACTITIONER

## 2022-10-11 PROCEDURE — 99213 OFFICE O/P EST LOW 20 MIN: CPT | Performed by: NURSE PRACTITIONER

## 2022-10-11 PROCEDURE — 1036F TOBACCO NON-USER: CPT | Performed by: NURSE PRACTITIONER

## 2022-10-11 PROCEDURE — 81003 URINALYSIS AUTO W/O SCOPE: CPT | Performed by: NURSE PRACTITIONER

## 2022-10-11 PROCEDURE — 3017F COLORECTAL CA SCREEN DOC REV: CPT | Performed by: NURSE PRACTITIONER

## 2022-10-11 PROCEDURE — G8484 FLU IMMUNIZE NO ADMIN: HCPCS | Performed by: NURSE PRACTITIONER

## 2022-10-11 RX ORDER — PREDNISONE 20 MG/1
20 TABLET ORAL 2 TIMES DAILY
Qty: 10 TABLET | Refills: 0 | Status: SHIPPED | OUTPATIENT
Start: 2022-10-11 | End: 2022-10-16

## 2022-10-11 ASSESSMENT — ENCOUNTER SYMPTOMS
HEARTBURN: 0
COUGH: 1
SORE THROAT: 1
HEMOPTYSIS: 0
WHEEZING: 1
SHORTNESS OF BREATH: 0
RHINORRHEA: 0

## 2022-10-11 NOTE — PROGRESS NOTES
[address] [address]  Dept: [phone]  Dept Fax: [phone]    Carissa Sigala is a 74 y.o. female who presents to the urgent care today for her medical conditions/complaints as notedbelow. Carissa Sigala is c/o of Headache (Patient is here c/o of congestion, headache, bilateral ear pain only when swallow, hurts to swallow. This has been going on since last night. Patient is also feeling urgency and dysuria as well, this has been going on for a couple weeks. )      HPI:     74 yr old female presents for uri sx that started last night. Feels like covid from last time. Around sick grandson, mom has not tested him for anything yet. Hx asthma    She also c/o uti sx - burning, fx, urgency. No fevers    Cough  This is a new problem. The current episode started yesterday. The problem has been waxing and waning. The problem occurs every few minutes. The cough is Productive of sputum. Associated symptoms include headaches, myalgias, nasal congestion, a sore throat and wheezing. Pertinent negatives include no chest pain, chills, ear congestion, ear pain, fever, heartburn, hemoptysis, postnasal drip, rash, rhinorrhea, shortness of breath, sweats or weight loss. The symptoms are aggravated by lying down. She has tried nothing for the symptoms. The treatment provided mild relief. Her past medical history is significant for asthma, bronchitis, environmental allergies and pneumonia. There is no history of bronchiectasis, COPD or emphysema. Urinary Tract Infection  This is a new problem. The current episode started 1 to 4 weeks ago. The problem has been waxing and waning since onset. Associated symptoms include pain. Pertinent negatives include no hematuria. The pain is present in the urethra.      Past Medical History:   Diagnosis Date    Asthma 10/7/2013    Chronic rhinosinusitis 10/7/2013 Family history of diabetes mellitus (DM) 10/7/2013    Former smoker 10/7/2013        Current Outpatient Medications   Medication Sig Dispense Refill    predniSONE (DELTASONE) 20 MG tablet Take 1 tablet by mouth 2 times daily for 5 days 10 tablet 0    acetaminophen (TYLENOL) 325 MG tablet Take 325 mg by mouth every 6 hours as needed for Pain      Handicap Placard MISC by Does not apply route Dx: COPD     Valid for 6 months 1 each 0    fluticasone (FLONASE) 50 MCG/ACT nasal spray 2 sprays by Nasal route daily 16 g 0    Loratadine (CLARITIN PO) Take 10 mg by mouth      albuterol sulfate HFA (PROAIR HFA) 108 (90 BASE) MCG/ACT inhaler Inhale 2 puffs into the lungs every 6 hours as needed for Wheezing 1 Inhaler 1    FLUZONE QUADRIVALENT 0.5 ML injection  (Patient not taking: No sig reported)       No current facility-administered medications for this visit. Allergies   Allergen Reactions    Latex     Aleve [Naproxen Sodium] Other (See Comments)     Asthma attack    Asa [Aspirin]        Subjective:      Review of Systems   Constitutional:  Negative for chills, fever and weight loss. HENT:  Positive for sore throat. Negative for ear pain, postnasal drip and rhinorrhea. Respiratory:  Positive for cough and wheezing. Negative for hemoptysis and shortness of breath. Cardiovascular:  Negative for chest pain. Gastrointestinal:  Negative for heartburn. Genitourinary:  Negative for hematuria. Musculoskeletal:  Positive for myalgias. Skin:  Negative for rash. Allergic/Immunologic: Positive for environmental allergies. Neurological:  Positive for headaches. All other systems reviewed and are negative. 14 systems reviewed and negative except as listed in HPI. Objective:     Physical Exam  Vitals and nursing note reviewed. Constitutional:       General: She is not in acute distress. Appearance: Normal appearance. She is well-developed. She is obese.  She is not ill-appearing, toxic-appearing or (Temporal)   Ht 5' (1.524 m)   Wt 200 lb (90.7 kg)   SpO2 96%   BMI 39.06 kg/m²     Assessment:       Diagnosis Orders   1. Dysuria  POCT Urinalysis No Micro (Auto)    Culture, Urine      2. UTI symptoms  POCT Urinalysis No Micro (Auto)    Culture, Urine      3. Symptoms of upper respiratory infection (URI)  COVID-19      4. Sore throat  POCT rapid strep A    COVID-19          Plan:     Results for POC orders placed in visit on 10/11/22   POCT Urinalysis No Micro (Auto)   Result Value Ref Range    Color, UA YELLOW     Clarity, UA CLEAR     Glucose, UA POC NEAGTIVE     Bilirubin, UA NEAGTIVE     Ketones, UA NEGATIVE     Spec Grav, UA >=1.030     Blood, UA POC NEGATIVE     pH, UA 5.5     Protein, UA POC NEGATIVE     Urobilinogen, UA 0.2 E.U./dL     Leukocytes, UA NEGATIVE     Nitrite, UA NEGATIVE    POCT rapid strep A   Result Value Ref Range    Strep A Ag None Detected None Detected     Interested in paxlovid, has had before - qualifies for full dose  POCT strep neg    Today is day 1 of sx. POCT urine as above  Has been drinking a lot of water, has uti sx  Urine culture pending  Start home mucinex to help break up thick mucous  Start home flonase and claritin  Inhalers from home  Prednisone rx for reported wheezing  Pt aware early in sx for covid testing and may test neg though she actually has covid    Reviewed over-the-counter treatments for symptom management. Will send out COVID19 testing. Possible treatment alterations based on the results. Patient instructed to self-quarantine until testing results are back. Patient instructed not to return to work until results are back. Tylenol as needed for fever/pain. Encouraged adequate hydration and rest.  The patient indicates understanding of these issues and agrees with the plan. Educational materials provided on AVS.  Follow up if symptoms do not improve/worsen. Discussed symptoms that will warrant urgent ED evaluation/treatment.     Return if symptoms worsen or fail to improve, for Make an Appt. with your Primary Care in 1 week. Orders Placed This Encounter   Medications    predniSONE (DELTASONE) 20 MG tablet     Sig: Take 1 tablet by mouth 2 times daily for 5 days     Dispense:  10 tablet     Refill:  0         Patient given educational materials - see patient instructions. Discussed use, benefit, and side effects of prescribed medications. All patient questions answered. Pt voicedunderstanding.     Electronically signed by VALERIA Amado CNP on 10/11/2022 at 9:03 AM

## 2022-10-12 ENCOUNTER — HOSPITAL ENCOUNTER (OUTPATIENT)
Age: 67
Setting detail: SPECIMEN
Discharge: HOME | End: 2022-10-12

## 2022-10-12 DIAGNOSIS — Z11.52 ENCOUNTER FOR SCREENING FOR COVID-19: ICD-10-CM

## 2022-10-12 DIAGNOSIS — Z11.52 ENCOUNTER FOR SCREENING FOR COVID-19: Primary | ICD-10-CM

## 2022-10-12 LAB
CULTURE: NORMAL
SPECIMEN DESCRIPTION: NORMAL

## 2022-10-13 LAB
SARS-COV-2: NORMAL
SARS-COV-2: NOT DETECTED
SOURCE: NORMAL

## 2022-12-30 ENCOUNTER — OFFICE VISIT (OUTPATIENT)
Dept: FAMILY MEDICINE CLINIC | Age: 67
End: 2022-12-30
Payer: COMMERCIAL

## 2022-12-30 VITALS
HEART RATE: 81 BPM | SYSTOLIC BLOOD PRESSURE: 131 MMHG | DIASTOLIC BLOOD PRESSURE: 85 MMHG | OXYGEN SATURATION: 96 % | TEMPERATURE: 97.2 F

## 2022-12-30 DIAGNOSIS — R05.1 ACUTE COUGH: Primary | ICD-10-CM

## 2022-12-30 DIAGNOSIS — J01.90 ACUTE BACTERIAL SINUSITIS: ICD-10-CM

## 2022-12-30 DIAGNOSIS — J45.21 MILD INTERMITTENT ASTHMA WITH EXACERBATION: ICD-10-CM

## 2022-12-30 DIAGNOSIS — B96.89 ACUTE BACTERIAL SINUSITIS: ICD-10-CM

## 2022-12-30 PROCEDURE — 99213 OFFICE O/P EST LOW 20 MIN: CPT | Performed by: NURSE PRACTITIONER

## 2022-12-30 PROCEDURE — 1090F PRES/ABSN URINE INCON ASSESS: CPT | Performed by: NURSE PRACTITIONER

## 2022-12-30 PROCEDURE — 1036F TOBACCO NON-USER: CPT | Performed by: NURSE PRACTITIONER

## 2022-12-30 PROCEDURE — G8417 CALC BMI ABV UP PARAM F/U: HCPCS | Performed by: NURSE PRACTITIONER

## 2022-12-30 PROCEDURE — G8484 FLU IMMUNIZE NO ADMIN: HCPCS | Performed by: NURSE PRACTITIONER

## 2022-12-30 PROCEDURE — 1123F ACP DISCUSS/DSCN MKR DOCD: CPT | Performed by: NURSE PRACTITIONER

## 2022-12-30 PROCEDURE — G8427 DOCREV CUR MEDS BY ELIG CLIN: HCPCS | Performed by: NURSE PRACTITIONER

## 2022-12-30 PROCEDURE — 3017F COLORECTAL CA SCREEN DOC REV: CPT | Performed by: NURSE PRACTITIONER

## 2022-12-30 PROCEDURE — G8399 PT W/DXA RESULTS DOCUMENT: HCPCS | Performed by: NURSE PRACTITIONER

## 2022-12-30 RX ORDER — AZITHROMYCIN 250 MG/1
250 TABLET, FILM COATED ORAL SEE ADMIN INSTRUCTIONS
Qty: 6 TABLET | Refills: 0 | Status: SHIPPED | OUTPATIENT
Start: 2022-12-30 | End: 2023-01-04

## 2022-12-30 RX ORDER — PREDNISONE 20 MG/1
20 TABLET ORAL 2 TIMES DAILY
Qty: 10 TABLET | Refills: 0 | Status: SHIPPED | OUTPATIENT
Start: 2022-12-30 | End: 2023-01-04

## 2022-12-30 ASSESSMENT — ENCOUNTER SYMPTOMS
CHEST TIGHTNESS: 1
HOARSE VOICE: 0
SINUS PRESSURE: 1
COUGH: 1
CHOKING: 0
SORE THROAT: 0
SHORTNESS OF BREATH: 0
STRIDOR: 0
WHEEZING: 1
SWOLLEN GLANDS: 0

## 2022-12-30 NOTE — PROGRESS NOTES
[address] [address]  Dept: [phone]  Dept Fax: [phone]    Kerline Castellanos is a 69 y.o. female who presents to the urgent care today for her medical conditions/complaints as notedbelow. Kerline Castellanos is c/o of Chest Congestion (Onset 1 week ago), Cough (productive), and Ear Fullness      HPI:     69 yr old female presents for sinus 1 week, now moving into chest. Hx asthma, coughing up clr to white mucous. Nose prod yellow  No fevers, gen body aches  Feels like drng aggravating asthma  Uses flonase and claritin year around. Sinusitis  This is a new problem. The current episode started in the past 7 days. The problem has been gradually worsening since onset. There has been no fever. The pain is mild. Associated symptoms include congestion, coughing and sinus pressure. Pertinent negatives include no chills, diaphoresis, ear pain, headaches, hoarse voice, neck pain, shortness of breath, sneezing, sore throat or swollen glands. Treatments tried: dez perles, flonase and claritin. The treatment provided mild relief.      Past Medical History:   Diagnosis Date    Asthma 10/7/2013    Chronic rhinosinusitis 10/7/2013    Family history of diabetes mellitus (DM) 10/7/2013    Former smoker 10/7/2013        Current Outpatient Medications   Medication Sig Dispense Refill    predniSONE (DELTASONE) 20 MG tablet Take 1 tablet by mouth 2 times daily for 5 days 10 tablet 0    azithromycin (ZITHROMAX) 250 MG tablet Take 1 tablet by mouth See Admin Instructions for 5 days 500mg on day 1 followed by 250mg on days 2 - 5 6 tablet 0    acetaminophen (TYLENOL) 325 MG tablet Take 325 mg by mouth every 6 hours as needed for Pain      Handicap Placard MISC by Does not apply route Dx: COPD     Valid for 6 months 1 each 0    fluticasone (FLONASE) 50 MCG/ACT nasal spray 2 sprays by Nasal route daily 16 g 0 Loratadine (CLARITIN PO) Take 10 mg by mouth      albuterol sulfate HFA (PROAIR HFA) 108 (90 BASE) MCG/ACT inhaler Inhale 2 puffs into the lungs every 6 hours as needed for Wheezing 1 Inhaler 1    FLUZONE QUADRIVALENT 0.5 ML injection  (Patient not taking: No sig reported)       No current facility-administered medications for this visit. Allergies   Allergen Reactions    Latex     Aleve [Naproxen Sodium] Other (See Comments)     Asthma attack    Asa [Aspirin]        Subjective:      Review of Systems   Constitutional:  Negative for chills and diaphoresis. HENT:  Positive for congestion and sinus pressure. Negative for ear pain, hoarse voice, sneezing and sore throat. Respiratory:  Positive for cough, chest tightness and wheezing. Negative for choking, shortness of breath and stridor. Musculoskeletal:  Negative for neck pain. Neurological:  Negative for headaches. All other systems reviewed and are negative. 14 systems reviewed and negative except as listed in HPI. Objective:     Physical Exam  Vitals and nursing note reviewed. Constitutional:       General: She is not in acute distress. Appearance: Normal appearance. She is not ill-appearing, toxic-appearing or diaphoretic. HENT:      Head: Normocephalic and atraumatic. Right Ear: Tympanic membrane, ear canal and external ear normal.      Left Ear: Tympanic membrane, ear canal and external ear normal.      Nose: Congestion present. No rhinorrhea. Comments: roma sinus tenderness  No facial swelling or erythema     Mouth/Throat:      Mouth: Mucous membranes are moist.      Pharynx: No oropharyngeal exudate or posterior oropharyngeal erythema. Comments: + cobblestoning - thin yellow  Uvula midline no edema  Handling oral secretions without difficulty  Eyes:      General: No scleral icterus. Right eye: No discharge. Left eye: No discharge.       Conjunctiva/sclera: Conjunctivae normal.      Pupils: Pupils are equal, round, and reactive to light. Cardiovascular:      Rate and Rhythm: Normal rate and regular rhythm. Heart sounds: Normal heart sounds. Pulmonary:      Effort: Pulmonary effort is normal. No respiratory distress. Breath sounds: Normal breath sounds. No stridor. No wheezing, rhonchi or rales. Comments: Fx tight moist np cough  Musculoskeletal:         General: No signs of injury. Cervical back: Neck supple. Comments: Ambulated to and from room, gait steady, moving all ext without difficulty   Skin:     General: Skin is warm and dry. Capillary Refill: Capillary refill takes less than 2 seconds. Findings: No rash ( no rash to visible skin). Neurological:      General: No focal deficit present. Mental Status: She is alert and oriented to person, place, and time. Psychiatric:         Mood and Affect: Mood normal.         Behavior: Behavior normal.     /85 (Site: Left Upper Arm, Position: Sitting, Cuff Size: Large Adult)   Pulse 81   Temp 97.2 °F (36.2 °C) (Infrared)   SpO2 96%     Assessment:       Diagnosis Orders   1. Acute cough  azithromycin (ZITHROMAX) 250 MG tablet      2. Mild intermittent asthma with exacerbation  azithromycin (ZITHROMAX) 250 MG tablet      3. Acute bacterial sinusitis            Plan:    Vss - spo2 96% - baseline for her, ls clear t/o  Home covid test neg, declines additional  Pnd aggravating her asthma  Based on sx duration and yellow mucous, will tx as bacterial  No flu like sx  Increase flonase to bid while sx last  Pred rx  [facility] rx  Declines chest xray at this time  No improvmeent then consider chest xray  Return for Make an Appt. with your Primary Care in 1 week.     Orders Placed This Encounter   Medications    predniSONE (DELTASONE) 20 MG tablet     Sig: Take 1 tablet by mouth 2 times daily for 5 days     Dispense:  10 tablet     Refill:  0    azithromycin (ZITHROMAX) 250 MG tablet     Sig: Take 1 tablet by mouth See Admin Instructions for 5 days 500mg on day 1 followed by 250mg on days 2 - 5     Dispense:  6 tablet     Refill:  0         Patient given educational materials - see patient instructions. Discussed use, benefit, and side effects of prescribed medications. All patient questions answered. Pt voicedunderstanding.     Electronically signed by VALERIA Deluna CNP on 12/30/2022 at 11:51 AM

## 2023-02-04 ENCOUNTER — OFFICE VISIT (OUTPATIENT)
Dept: FAMILY MEDICINE CLINIC | Age: 68
End: 2023-02-04
Payer: COMMERCIAL

## 2023-02-04 ENCOUNTER — HOSPITAL ENCOUNTER (OUTPATIENT)
Dept: GENERAL RADIOLOGY | Age: 68
End: 2023-02-04
Payer: COMMERCIAL

## 2023-02-04 ENCOUNTER — HOSPITAL ENCOUNTER (OUTPATIENT)
Age: 68
End: 2023-02-04
Payer: COMMERCIAL

## 2023-02-04 VITALS
DIASTOLIC BLOOD PRESSURE: 84 MMHG | OXYGEN SATURATION: 97 % | HEART RATE: 75 BPM | TEMPERATURE: 96.9 F | SYSTOLIC BLOOD PRESSURE: 141 MMHG

## 2023-02-04 DIAGNOSIS — M79.676 PAIN OF FIFTH TOE: ICD-10-CM

## 2023-02-04 DIAGNOSIS — R05.3 CHRONIC COUGH: ICD-10-CM

## 2023-02-04 DIAGNOSIS — R05.3 CHRONIC COUGH: Primary | ICD-10-CM

## 2023-02-04 DIAGNOSIS — J44.9 CHRONIC OBSTRUCTIVE PULMONARY DISEASE, UNSPECIFIED COPD TYPE (HCC): ICD-10-CM

## 2023-02-04 PROCEDURE — G8417 CALC BMI ABV UP PARAM F/U: HCPCS | Performed by: FAMILY MEDICINE

## 2023-02-04 PROCEDURE — G8484 FLU IMMUNIZE NO ADMIN: HCPCS | Performed by: FAMILY MEDICINE

## 2023-02-04 PROCEDURE — 71046 X-RAY EXAM CHEST 2 VIEWS: CPT

## 2023-02-04 PROCEDURE — 73630 X-RAY EXAM OF FOOT: CPT

## 2023-02-04 PROCEDURE — 3023F SPIROM DOC REV: CPT | Performed by: FAMILY MEDICINE

## 2023-02-04 PROCEDURE — 3017F COLORECTAL CA SCREEN DOC REV: CPT | Performed by: FAMILY MEDICINE

## 2023-02-04 PROCEDURE — 1036F TOBACCO NON-USER: CPT | Performed by: FAMILY MEDICINE

## 2023-02-04 PROCEDURE — 1123F ACP DISCUSS/DSCN MKR DOCD: CPT | Performed by: FAMILY MEDICINE

## 2023-02-04 PROCEDURE — G8399 PT W/DXA RESULTS DOCUMENT: HCPCS | Performed by: FAMILY MEDICINE

## 2023-02-04 PROCEDURE — 1090F PRES/ABSN URINE INCON ASSESS: CPT | Performed by: FAMILY MEDICINE

## 2023-02-04 PROCEDURE — 99213 OFFICE O/P EST LOW 20 MIN: CPT | Performed by: FAMILY MEDICINE

## 2023-02-04 PROCEDURE — G8427 DOCREV CUR MEDS BY ELIG CLIN: HCPCS | Performed by: FAMILY MEDICINE

## 2023-02-04 RX ORDER — PREDNISONE 20 MG/1
40 TABLET ORAL DAILY
Qty: 10 TABLET | Refills: 0 | Status: SHIPPED | OUTPATIENT
Start: 2023-02-04 | End: 2023-02-09

## 2023-02-04 RX ORDER — BENZONATATE 100 MG/1
100 CAPSULE ORAL 3 TIMES DAILY PRN
Qty: 30 CAPSULE | Refills: 0 | Status: SHIPPED | OUTPATIENT
Start: 2023-02-04 | End: 2023-03-06

## 2023-02-04 RX ORDER — FLUTICASONE PROPIONATE AND SALMETEROL 250; 50 UG/1; UG/1
1 POWDER RESPIRATORY (INHALATION) EVERY 12 HOURS
Qty: 60 EACH | Refills: 0 | Status: SHIPPED | OUTPATIENT
Start: 2023-02-04

## 2023-02-04 RX ORDER — AZITHROMYCIN 250 MG/1
TABLET, FILM COATED ORAL
Qty: 1 PACKET | Refills: 0 | Status: SHIPPED | OUTPATIENT
Start: 2023-02-04

## 2023-02-04 ASSESSMENT — ENCOUNTER SYMPTOMS
SHORTNESS OF BREATH: 1
WHEEZING: 1
SORE THROAT: 0
COUGH: 1

## 2023-02-04 NOTE — PROGRESS NOTES
La Cobos MD  [facility] WALK-IN FAMILY MEDICINE  [address] [facility] [address]  Dept: [phone]    Cata Greenberg is a 69 y.o. female who presents today for hermedical conditions/complaints as noted below. Cata Greenberg is here today c/o Cough (Cough has been ongoing off and on for 2 years) and Foot Pain (Rt foot.  Injured 2 weeks ago )       HPI:     HPI    Patient presents to the walk-in clinic for evaluation of cough  She had COVID initially in 2020, since then has had a chronic cough, during flareups of her cough will receive steroids and antibiotics which help only temporarily  Most recent flareup of her cough began 2 weeks ago, using Tessalon Perles which help, cough is productive  Endorses intermittent shortness of breath when going up and down steps or carrying something, has been having wheezing  History of asthma for which she is on albuterol as needed, lately has been using it daily  Quit smoking 32 years ago but does have a 10-year smoking history, no recent PFT  Endorses congestion for which she is on Flonase  Denies sore throat, ear pain, fever    She is also complaining of foot pain x 2 weeks  She hit the fourth and fifth digits of her right foot 2 weeks ago against a chair leg, immediately noticed a deep bruising to these 2 digits, has been wrapping the digits and wearing a boot that she had from an old injury, these 2 digits are still giving her some pain which she rates at 5/10, Tylenol helps the pain, the right fifth toe is still swollen, pain radiates to the lateral aspect of the right foot, no pain in the midfoot or ankle    BP Readings from Last 3 Encounters:   02/04/23 (!) 141/84   12/30/22 131/85   10/11/22 137/80         Patient Active Problem List   Diagnosis    Allergic rhinitis    Asthma    Family history of diabetes mellitus (DM)    Chronic rhinosinusitis    Former smoker    Enthesopathy of ankle and tarsus    Exostosis    Plantar fasciitis of right foot    Sprain of left lower leg    Pain of left lower extremity    Morbid obesity (HCC)       Past Medical History:   Diagnosis Date    Asthma 10/7/2013    Chronic rhinosinusitis 10/7/2013    Family history of diabetes mellitus (DM) 10/7/2013    Former smoker 10/7/2013     Past Surgical History:   Procedure Laterality Date    TONSILLECTOMY       Family History   Problem Relation Age of Onset    Diabetes Mother     High Blood Pressure Mother     Diabetes Father     High Blood Pressure Sister     Diabetes Sister     High Blood Pressure Brother     Stroke Brother     Diabetes Brother      Social History     Tobacco Use    Smoking status: Former     Packs/day: 0.60     Years: 7.00     Pack years: 4.20     Types: Cigarettes     Quit date: 1992     Years since quittin.1    Smokeless tobacco: Former   Vaping Use    Vaping Use: Never used   Substance Use Topics    Alcohol use: No    Drug use: No       Current Outpatient Medications:     fluticasone-salmeterol (ADVAIR) 250-50 MCG/ACT AEPB diskus inhaler, Inhale 1 puff into the lungs in the morning and 1 puff in the evening., Disp: 60 each, Rfl: 0    predniSONE (DELTASONE) 20 MG tablet, Take 2 tablets by mouth daily for 5 days, Disp: 10 tablet, Rfl: 0    benzonatate (TESSALON PERLES) 100 MG capsule, Take 1 capsule by mouth 3 times daily as needed for Cough, Disp: 30 capsule, Rfl: 0    azithromycin (ZITHROMAX) 250 MG tablet, Take 2 tabs (500 mg) on Day 1, and take 1 tab (250 mg) on days 2 through 5., Disp: 1 packet, Rfl: 0    acetaminophen (TYLENOL) 325 MG tablet, Take 325 mg by mouth every 6 hours as needed for Pain, Disp: , Rfl:     Handicap Placard MISC, by Does not apply route Dx: COPD   Valid for 6 months, Disp: 1 each, Rfl: 0    fluticasone (FLONASE) 50 MCG/ACT nasal spray, 2 sprays by Nasal route daily, Disp: 16 g, Rfl: 0    Loratadine (CLARITIN PO), Take 10 mg by mouth, Disp: , Rfl:     albuterol sulfate HFA (PROAIR HFA) 108 (90 BASE) MCG/ACT inhaler, Inhale 2 puffs into the lungs every 6 hours as needed for Wheezing, Disp: 1 Inhaler, Rfl: 1    FLUZONE QUADRIVALENT 0.5 ML injection, , Disp: , Rfl:     Subjective:     Review of Systems   Constitutional:  Negative for fever. HENT:  Positive for congestion. Negative for ear pain and sore throat. Respiratory:  Positive for cough, shortness of breath and wheezing. Musculoskeletal:  Positive for joint swelling. Objective:     BP (!) 141/84   Pulse 75   Temp 96.9 °F (36.1 °C)   SpO2 97%     Physical Exam  Constitutional:       Appearance: Normal appearance. She is well-developed. She is not ill-appearing, toxic-appearing or diaphoretic. HENT:      Head: Normocephalic. Mouth/Throat:      Pharynx: No oropharyngeal exudate or posterior oropharyngeal erythema. Eyes:      General:         Right eye: No discharge. Left eye: No discharge. Conjunctiva/sclera: Conjunctivae normal.   Cardiovascular:      Rate and Rhythm: Normal rate and regular rhythm. Pulses:           Dorsalis pedis pulses are 2+ on the right side. Posterior tibial pulses are 2+ on the right side. Heart sounds: Normal heart sounds. No murmur heard. Pulmonary:      Effort: Pulmonary effort is normal. No respiratory distress. Breath sounds: Normal breath sounds. No wheezing. Musculoskeletal:        Feet:    Feet:      Right foot:      Skin integrity: No skin breakdown. Comments: Bruising in the right fourth and fifth toes has resolved. Right fifth toe still has a mild degree of swelling. Area is tender to touch. Patient has difficulty with plantar flexion of the fifth toe, able to dorsiflex without difficulty. Lymphadenopathy:      Cervical: No cervical adenopathy. Neurological:      Mental Status: She is alert. Psychiatric:         Behavior: Behavior normal.         Thought Content:  Thought content normal.         Judgment: Judgment normal.       Assessment & Plan:       Diagnosis Orders   1. Chronic cough  XR CHEST (2 VW)    fluticasone-salmeterol (ADVAIR) 250-50 MCG/ACT AEPB diskus inhaler    predniSONE (DELTASONE) 20 MG tablet    benzonatate (TESSALON PERLES) 100 MG capsule    azithromycin (ZITHROMAX) 250 MG tablet      2. Chronic obstructive pulmonary disease, unspecified COPD type (HCC)  fluticasone-salmeterol (ADVAIR) 250-50 MCG/ACT AEPB diskus inhaler      3. Pain of fifth toe  XR FOOT RIGHT (MIN 3 VIEWS)      Patient has had a chronic cough for years now following her initial COVID diagnosis. Since then she has had COVID a few more times. CXR was ordered in August but not done. New order placed. Rx sent for azithromycin, Tessalon Perles, prednisone. Continue Flonase. I feel she would benefit from a daily inhaler, Rx for Advair sent. She has an upcoming appointment with her PCP in 10 days at which time I believe a PFT order may be warranted. She will follow with her PCP to review her symptoms. She will get seen sooner should her symptoms worsen. Discussed importance of ej taping the fifth toe. X-ray ordered. Blood pressure is elevated today - was normal on last check. She thinks the elevation is from her current symptoms. She will monitor her blood pressure at home and let her PCP know should readings be persistently elevated. Call or return to clinic prn if these symptoms worsen or fail to improve as anticipated. I have reviewed the instructions with the patient, answering all questions to their satisfaction.     Electronically signed by Corry Medeiros MD on 2/4/2023 at 4:18 PM

## 2023-02-14 ENCOUNTER — OFFICE VISIT (OUTPATIENT)
Dept: FAMILY MEDICINE CLINIC | Age: 68
End: 2023-02-14
Payer: COMMERCIAL

## 2023-02-14 VITALS
BODY MASS INDEX: 41.68 KG/M2 | RESPIRATION RATE: 16 BRPM | DIASTOLIC BLOOD PRESSURE: 82 MMHG | WEIGHT: 213.4 LBS | HEART RATE: 60 BPM | OXYGEN SATURATION: 98 % | SYSTOLIC BLOOD PRESSURE: 122 MMHG

## 2023-02-14 DIAGNOSIS — J45.20 MILD INTERMITTENT ASTHMA WITHOUT COMPLICATION: ICD-10-CM

## 2023-02-14 DIAGNOSIS — R06.02 SHORTNESS OF BREATH: ICD-10-CM

## 2023-02-14 DIAGNOSIS — M79.671 RIGHT FOOT PAIN: Primary | ICD-10-CM

## 2023-02-14 DIAGNOSIS — R06.02 SHORTNESS OF BREATH: Primary | ICD-10-CM

## 2023-02-14 DIAGNOSIS — J30.9 ALLERGIC RHINITIS, UNSPECIFIED SEASONALITY, UNSPECIFIED TRIGGER: ICD-10-CM

## 2023-02-14 PROCEDURE — G8484 FLU IMMUNIZE NO ADMIN: HCPCS | Performed by: FAMILY MEDICINE

## 2023-02-14 PROCEDURE — G8427 DOCREV CUR MEDS BY ELIG CLIN: HCPCS | Performed by: FAMILY MEDICINE

## 2023-02-14 PROCEDURE — 99213 OFFICE O/P EST LOW 20 MIN: CPT | Performed by: FAMILY MEDICINE

## 2023-02-14 PROCEDURE — 1090F PRES/ABSN URINE INCON ASSESS: CPT | Performed by: FAMILY MEDICINE

## 2023-02-14 PROCEDURE — G8417 CALC BMI ABV UP PARAM F/U: HCPCS | Performed by: FAMILY MEDICINE

## 2023-02-14 PROCEDURE — 3017F COLORECTAL CA SCREEN DOC REV: CPT | Performed by: FAMILY MEDICINE

## 2023-02-14 PROCEDURE — G8399 PT W/DXA RESULTS DOCUMENT: HCPCS | Performed by: FAMILY MEDICINE

## 2023-02-14 PROCEDURE — 1123F ACP DISCUSS/DSCN MKR DOCD: CPT | Performed by: FAMILY MEDICINE

## 2023-02-14 PROCEDURE — 1036F TOBACCO NON-USER: CPT | Performed by: FAMILY MEDICINE

## 2023-02-14 SDOH — ECONOMIC STABILITY: INCOME INSECURITY: HOW HARD IS IT FOR YOU TO PAY FOR THE VERY BASICS LIKE FOOD, HOUSING, MEDICAL CARE, AND HEATING?: NOT HARD AT ALL

## 2023-02-14 SDOH — ECONOMIC STABILITY: HOUSING INSECURITY
IN THE LAST 12 MONTHS, WAS THERE A TIME WHEN YOU DID NOT HAVE A STEADY PLACE TO SLEEP OR SLEPT IN A SHELTER (INCLUDING NOW)?: NO

## 2023-02-14 SDOH — ECONOMIC STABILITY: FOOD INSECURITY: WITHIN THE PAST 12 MONTHS, YOU WORRIED THAT YOUR FOOD WOULD RUN OUT BEFORE YOU GOT MONEY TO BUY MORE.: NEVER TRUE

## 2023-02-14 SDOH — ECONOMIC STABILITY: FOOD INSECURITY: WITHIN THE PAST 12 MONTHS, THE FOOD YOU BOUGHT JUST DIDN'T LAST AND YOU DIDN'T HAVE MONEY TO GET MORE.: NEVER TRUE

## 2023-02-14 ASSESSMENT — PATIENT HEALTH QUESTIONNAIRE - PHQ9
2. FEELING DOWN, DEPRESSED OR HOPELESS: 0
SUM OF ALL RESPONSES TO PHQ QUESTIONS 1-9: 0
SUM OF ALL RESPONSES TO PHQ QUESTIONS 1-9: 0
1. LITTLE INTEREST OR PLEASURE IN DOING THINGS: 0
SUM OF ALL RESPONSES TO PHQ9 QUESTIONS 1 & 2: 0
SUM OF ALL RESPONSES TO PHQ QUESTIONS 1-9: 0
SUM OF ALL RESPONSES TO PHQ QUESTIONS 1-9: 0

## 2023-02-14 ASSESSMENT — ENCOUNTER SYMPTOMS
BLOOD IN STOOL: 0
SHORTNESS OF BREATH: 1
CHEST TIGHTNESS: 0
ABDOMINAL PAIN: 0

## 2023-02-14 NOTE — PROGRESS NOTES
Subjective:      Patient ID: Chiqui Muse is a 69 y.o. female. HPI  Visit Information    Have you changed or started any medications since your last visit including any over-the-counter medicines, vitamins, or herbal medicines? no   Are you having any side effects from any of your medications? -  no  Have you stopped taking any of your medications? Is so, why? -  no    Have you seen any other physician or provider since your last visit? Yes - Records Obtained  Have you had any other diagnostic tests since your last visit? Yes - Records Obtained  Have you been seen in the emergency room and/or had an admission to a hospital since we last saw you? Yes - Records Obtained  Have you had your routine dental cleaning in the past 6 months? yes - dentures     Have you activated your [employer] account? If not, what are your barriers? No:      Patient Care Team:  Belen Haynes MD as PCP - General (Family Medicine)  Belen Haynes MD as PCP - Empaneled Provider    Medical History Review  Past Medical, Family, and Social History reviewed and does contribute to the patient presenting condition    Health Maintenance   Topic Date Due    Hepatitis C screen  Never done    Breast cancer screen  Never done    Colorectal Cancer Screen  04/03/2018    Annual Wellness Visit (AWV)  Never done    Shingles vaccine (2 of 2) 11/15/2022    Depression Screen  02/26/2023    A1C test (Diabetic or Prediabetic)  08/17/2023    Lipids  08/17/2027    DTaP/Tdap/Td vaccine (3 - Td or Tdap) 04/28/2032    DEXA (modify frequency per FRAX score)  Completed    Flu vaccine  Completed    Pneumococcal 65+ years Vaccine  Completed    COVID-19 Vaccine  Completed    Hepatitis A vaccine  Aged Out    Hib vaccine  Aged Out    Meningococcal (ACWY) vaccine  Aged Out   Patient is a 55-year-old obese white female who presents with complaint of right foot pain, asthma, shortness of breath, allergic rhinitis.   She states that on 1/27/2023 she struck her right fourth and fifth toes against a chair and continues to have pain in those toes. She was seen for this at a walk-in clinic and x-rays of the right foot did not show any fracture. She states she also had a chest x-ray at the walk-in clinic which was negative. She states that she gets shortness of breath occasionally and has a history of asthma but has not followed up with her pulmonologist in several years. She denies any fever, chills, chest pain, abdominal pain. Review of Systems   Constitutional:  Negative for chills and fever. HENT:  Negative for congestion. Respiratory:  Positive for shortness of breath. Negative for chest tightness. Cardiovascular:  Negative for chest pain. Gastrointestinal:  Negative for abdominal pain and blood in stool. Genitourinary:  Negative for dysuria and hematuria. Skin:  Negative for rash. Neurological:  Negative for dizziness. Psychiatric/Behavioral:  Negative for dysphoric mood. Objective:   Physical Exam  Vitals and nursing note reviewed. Constitutional:       General: She is not in acute distress. Appearance: She is well-developed. HENT:      Head: Normocephalic and atraumatic. Right Ear: Tympanic membrane, ear canal and external ear normal.      Left Ear: Tympanic membrane, ear canal and external ear normal.      Nose: Nose normal.      Mouth/Throat:      Mouth: Mucous membranes are moist.      Pharynx: Oropharynx is clear. Eyes:      General: No scleral icterus. Right eye: No discharge. Left eye: No discharge. Conjunctiva/sclera: Conjunctivae normal.   Cardiovascular:      Rate and Rhythm: Normal rate and regular rhythm. Heart sounds: Normal heart sounds. Pulmonary:      Effort: Pulmonary effort is normal. No respiratory distress. Breath sounds: Normal breath sounds. No wheezing. Abdominal:      General: There is no distension. Palpations: Abdomen is soft. Tenderness:  There is no abdominal tenderness. Musculoskeletal:      Cervical back: Neck supple. Right foot: Tenderness (Tenderness of right fourth and fifth toes) present. Skin:     General: Skin is warm and dry. Findings: No rash. Neurological:      Mental Status: She is alert and oriented to person, place, and time. Psychiatric:         Mood and Affect: Mood normal.         Behavior: Behavior normal.       Assessment:      1. Right foot pain  Patient referred to her podiatrist    2. Shortness of breath  Patient referred to her pulmonologist    3. Mild intermittent asthma without complication  Patient referred to her pulmonologist    4.  Allergic rhinitis, unspecified seasonality, unspecified trigger  Continue current management        Plan:      Continue routine medications  Patient referred to her podiatrist and pulmonologist  Follow-up in 6 months or sooner if needed

## 2023-02-24 ENCOUNTER — OFFICE VISIT (OUTPATIENT)
Dept: PODIATRY | Age: 68
End: 2023-02-24

## 2023-02-24 VITALS — HEIGHT: 60 IN | WEIGHT: 210 LBS | BODY MASS INDEX: 41.23 KG/M2

## 2023-02-24 DIAGNOSIS — R60.0 EDEMA, LOWER EXTREMITY: ICD-10-CM

## 2023-02-24 DIAGNOSIS — M79.671 PAIN IN RIGHT FOOT: ICD-10-CM

## 2023-02-24 DIAGNOSIS — S92.514A CLOSED NONDISPLACED FRACTURE OF PROXIMAL PHALANX OF LESSER TOE OF RIGHT FOOT, INITIAL ENCOUNTER: Primary | ICD-10-CM

## 2023-02-24 ASSESSMENT — ENCOUNTER SYMPTOMS
SHORTNESS OF BREATH: 0
NAUSEA: 0
BACK PAIN: 0
COLOR CHANGE: 0
DIARRHEA: 0

## 2023-02-24 NOTE — PROGRESS NOTES
Omar Prestno is a 69 y.o. female who presents to the office today with chief complaint of pain to the outside and top of the right foot. Chief Complaint   Patient presents with    Foot Pain     Right foot pain,    Symptoms began about 4 week(s) ago. Patient complains of injury to the right foot. Patient states that she stubbed her right foot on a chair leg. Pain is rated 2 out of 10 at it's worst and is described as intermittent. Treatments prior to today's visit include: Patient went to the ED and had xrays taken, but was told that nothing was broken. Patient states that she wore a fracture shoe on the right foot when the injury first occurred, but now she is just taping the toes. Allergies   Allergen Reactions    Latex     Aleve [Naproxen Sodium] Other (See Comments)     Asthma attack    Asa [Aspirin]        Past Medical History:   Diagnosis Date    Asthma 10/7/2013    Chronic rhinosinusitis 10/7/2013    Family history of diabetes mellitus (DM) 10/7/2013    Former smoker 10/7/2013       Prior to Admission medications    Medication Sig Start Date End Date Taking?  Authorizing Provider   acetaminophen (TYLENOL) 325 MG tablet Take 325 mg by mouth every 6 hours as needed for Pain   Yes Historical Provider, MD   Handicap Placard MISC by Does not apply route Dx: COPD     Valid for 6 months 8/12/22  Yes VALERIA Nunez CNP   fluticasone (FLONASE) 50 MCG/ACT nasal spray 2 sprays by Nasal route daily 2/26/22  Yes Luster Claude, APRN - CNP   Loratadine (CLARITIN PO) Take 10 mg by mouth   Yes Historical Provider, MD   albuterol sulfate HFA (PROAIR HFA) 108 (90 BASE) MCG/ACT inhaler Inhale 2 puffs into the lungs every 6 hours as needed for Wheezing 2/1/16  Yes Brenda Davis MD       Past Surgical History:   Procedure Laterality Date    TONSILLECTOMY  24       Family History   Problem Relation Age of Onset    Diabetes Mother     High Blood Pressure Mother     Diabetes Father     High Blood Pressure Sister Diabetes Sister     High Blood Pressure Brother     Stroke Brother     Diabetes Brother        Social History     Tobacco Use    Smoking status: Former     Packs/day: 0.60     Years: 7.00     Pack years: 4.20     Types: Cigarettes     Quit date: 1992     Years since quittin.1    Smokeless tobacco: Former   Substance Use Topics    Alcohol use: No       Review of Systems   Constitutional:  Negative for activity change, appetite change, chills, diaphoresis, fatigue and fever. Respiratory:  Negative for shortness of breath. Cardiovascular:  Negative for leg swelling. Gastrointestinal:  Negative for diarrhea and nausea. Endocrine: Negative for cold intolerance, heat intolerance and polyuria. Musculoskeletal:  Positive for arthralgias and joint swelling. Negative for back pain, gait problem and myalgias. Skin:  Negative for color change, pallor, rash and wound. Allergic/Immunologic: Negative for environmental allergies and food allergies. Neurological:  Negative for dizziness, weakness, light-headedness and numbness. Hematological:  Does not bruise/bleed easily. Psychiatric/Behavioral:  Negative for behavioral problems, confusion and self-injury. The patient is not nervous/anxious. Vitals: There were no vitals filed for this visit. General: AAO x 3 in NAD. Integument: There are no rashes, ulcers, or breaks in the skin noted to the bilateral lower extremities. There is no induration, subcutaneous nodules, or tightening of the skin noted to the bilateral.     Toenails 1-5 of the right foot do not present with thickness, elongation, discoloration, brittleness, subungual debris. Toenails 1-5 of the left foot do not present with thickness, elongation, discoloration, brittleness, subungual debris. Interdigital maceration absent to web spaces 1-4, Bilateral.     There are no preulcerative lesions noted to the right foot.      There are no preulcerative lesions noted to the left foot. The skin to the bilateral feet is not thin and shiny. The skin to the bilateral feet is  warm, supple, and dry. Vascular: DP pulse of the right foot is  palpable. DP pulse of the left foot is  palpable. PT pulse of the right foot is  palpable. PT pulse of the left foot is  palpable. CFT is less than 3 secs to the digits of the right foot. CFT is less than 3 secs to the digits of the left foot. There is mild edema noted to the right foot. There is hair growth noted to the digits of the bilateral feet. There are no varicosities noted to the right foot/ankle. There are no varicosities noted to the left foot/ankle. Erythema is absent to the bilateral feet. Neurological: Reflexes are present to the right plantar foot and to the Achilles tendon. Reflexes are present to the left plantar foot and to the Achilles tendon. Epicritic sensation is  intact to the right foot. Epicritic sensation is  intact to the left foot. Musculoskeletal:  Muscle strength is +5/5 to all four muscle groups of the right lower extremity and +5/5 to all four muscle groups of the left lower extremity. There are no areas of subluxation, dislocation, or laxity noted to either lower extremity. Range of motion to the right ankle is  free of pain or grinding. Range of motion to the left ankle is  free of pain or grinding. Range of motion to the right subtalar joint is  free of pain or grinding. Range of motion to the left subtalar joint is  free of pain or grinding. No abnormalities, asymmetries, or misalignments are seen between the extremities. Weightbearing evaluation does not reveal rearfoot eversion, medial prominence of the talar head, loss of the medial longitudinal arch height, and too many toes sign bilaterally. The lesser digits of the right foot are contracted.  There is pain with palpation to the fourth and fifth toes at the proximal phalanx of each. There is no transverse or frontal plane malalignment noted to either of these toes. There is pain with range of motion to the right fourth and fifth toes at the PIPJ and MTPJ. Pain is greatest to the fifth toe. The lesser digits of the left foot are contracted. There is no prominence noted to the first metatarsal head without abduction of the hallux of the right foot. There is no prominence noted to the first metatarsal head without abduction of the hallux of the left foot. Shoe examination was performed. Biomechanical Exam: normal bilaterally. X-ray's reviewed from the hospital: AP, Lateral, and Medial Oblique of the right foot. Findings: There is a nondisplaced fracture to the proximal phalanx of the fifth toe. Since the patient has been walking on the right foot since her injury I obtained new xrays today to evaluate for any shifting of the fracture or any possible healing. X-ray's taken: AP, Lateral, and Medial Oblique of the right foot. Findings: There is evidence of bone callous formation with osseous bridging noted to the proximal phalanx of the fifth toe, but no displacement is noted. Asessment: Patient is a 69 y.o. female with:    Diagnosis Orders   1. Closed nondisplaced fracture of proximal phalanx of lesser toe of right foot, initial encounter  XR FOOT RIGHT (MIN 3 VIEWS)      2. Edema, lower extremity  XR FOOT RIGHT (MIN 3 VIEWS)      3. Pain in right foot  XR FOOT RIGHT (MIN 3 VIEWS)          Plan:  1. Clinical evaluation of the patient. 2. Patient informed that she appears to have adequately healed the fracture to the fifth toe and no further treatment is required. Patient may continue with taping of the toe if she desires, but it is not necessary at this time. 3. Contact office with any questions/problems/concerns. Return if symptoms worsen or fail to improve.    2/24/2023      Gabbie Cheung DPM

## 2023-08-22 ENCOUNTER — OFFICE VISIT (OUTPATIENT)
Dept: FAMILY MEDICINE CLINIC | Age: 68
End: 2023-08-22

## 2023-08-22 VITALS
HEART RATE: 69 BPM | RESPIRATION RATE: 18 BRPM | OXYGEN SATURATION: 96 % | BODY MASS INDEX: 42.77 KG/M2 | SYSTOLIC BLOOD PRESSURE: 132 MMHG | WEIGHT: 219 LBS | DIASTOLIC BLOOD PRESSURE: 66 MMHG

## 2023-08-22 DIAGNOSIS — R73.01 IFG (IMPAIRED FASTING GLUCOSE): ICD-10-CM

## 2023-08-22 DIAGNOSIS — R42 DIZZINESS: Primary | ICD-10-CM

## 2023-08-22 DIAGNOSIS — R73.03 PREDIABETES: ICD-10-CM

## 2023-08-22 DIAGNOSIS — Z12.31 VISIT FOR SCREENING MAMMOGRAM: ICD-10-CM

## 2023-08-22 DIAGNOSIS — R51.9 NONINTRACTABLE HEADACHE, UNSPECIFIED CHRONICITY PATTERN, UNSPECIFIED HEADACHE TYPE: ICD-10-CM

## 2023-08-22 DIAGNOSIS — E78.2 MIXED HYPERLIPIDEMIA: Primary | ICD-10-CM

## 2023-08-22 DIAGNOSIS — R42 DIZZINESS: ICD-10-CM

## 2023-08-22 LAB — HBA1C MFR BLD: 6.5 %

## 2023-08-22 PROCEDURE — 1123F ACP DISCUSS/DSCN MKR DOCD: CPT | Performed by: FAMILY MEDICINE

## 2023-08-22 PROCEDURE — 99214 OFFICE O/P EST MOD 30 MIN: CPT | Performed by: FAMILY MEDICINE

## 2023-08-22 PROCEDURE — 83037 HB GLYCOSYLATED A1C HOME DEV: CPT | Performed by: FAMILY MEDICINE

## 2023-08-22 RX ORDER — MECLIZINE HYDROCHLORIDE 25 MG/1
25 TABLET ORAL 3 TIMES DAILY PRN
Qty: 20 TABLET | Refills: 0 | Status: SHIPPED | OUTPATIENT
Start: 2023-08-22

## 2023-08-22 RX ORDER — FLUTICASONE PROPIONATE AND SALMETEROL 250; 50 UG/1; UG/1
POWDER RESPIRATORY (INHALATION)
COMMUNITY

## 2023-08-22 SDOH — ECONOMIC STABILITY: FOOD INSECURITY: WITHIN THE PAST 12 MONTHS, THE FOOD YOU BOUGHT JUST DIDN'T LAST AND YOU DIDN'T HAVE MONEY TO GET MORE.: NEVER TRUE

## 2023-08-22 SDOH — ECONOMIC STABILITY: FOOD INSECURITY: WITHIN THE PAST 12 MONTHS, YOU WORRIED THAT YOUR FOOD WOULD RUN OUT BEFORE YOU GOT MONEY TO BUY MORE.: NEVER TRUE

## 2023-08-22 SDOH — ECONOMIC STABILITY: INCOME INSECURITY: HOW HARD IS IT FOR YOU TO PAY FOR THE VERY BASICS LIKE FOOD, HOUSING, MEDICAL CARE, AND HEATING?: NOT HARD AT ALL

## 2023-08-22 ASSESSMENT — PATIENT HEALTH QUESTIONNAIRE - PHQ9
SUM OF ALL RESPONSES TO PHQ QUESTIONS 1-9: 0
SUM OF ALL RESPONSES TO PHQ QUESTIONS 1-9: 0
2. FEELING DOWN, DEPRESSED OR HOPELESS: 0
SUM OF ALL RESPONSES TO PHQ9 QUESTIONS 1 & 2: 0
1. LITTLE INTEREST OR PLEASURE IN DOING THINGS: 0
SUM OF ALL RESPONSES TO PHQ QUESTIONS 1-9: 0
SUM OF ALL RESPONSES TO PHQ QUESTIONS 1-9: 0

## 2023-08-22 ASSESSMENT — ENCOUNTER SYMPTOMS
BLOOD IN STOOL: 0
SHORTNESS OF BREATH: 0
CHEST TIGHTNESS: 0
ABDOMINAL PAIN: 0

## 2023-08-22 NOTE — PROGRESS NOTES
Subjective:      Patient ID: Eugenio Hamman is a 69 y.o. female. HPI  Visit Information    Have you changed or started any medications since your last visit including any over-the-counter medicines, vitamins, or herbal medicines? no   Are you having any side effects from any of your medications? -  no  Have you stopped taking any of your medications? Is so, why? -  no    Have you seen any other physician or provider since your last visit? Yes - Records Obtained  Have you had any other diagnostic tests since your last visit? Yes - Records Obtained  Have you been seen in the emergency room and/or had an admission to a hospital since we last saw you? No  Have you had your routine dental cleaning in the past 6 months? yes - dentures    Have you activated your [employer] account? If not, what are your barriers? No:     Patient Care Team:  Ashley Moran MD as PCP - General (Family Medicine)  Ashley Moran MD as PCP - Empaneled Provider    Medical History Review  Past Medical, Family, and Social History reviewed and does contribute to the patient presenting condition    Health Maintenance   Topic Date Due    Hepatitis C screen  Never done    Breast cancer screen  Never done    Colorectal Cancer Screen  04/03/2018    Shingles vaccine (2 of 2) 11/15/2022    Flu vaccine (1) 08/01/2023    A1C test (Diabetic or Prediabetic)  08/17/2023    Depression Screen  02/14/2024    Lipids  08/17/2027    DTaP/Tdap/Td vaccine (3 - Td or Tdap) 04/28/2032    DEXA (modify frequency per FRAX score)  Completed    Pneumococcal 65+ years Vaccine  Completed    COVID-19 Vaccine  Completed    Hepatitis A vaccine  Aged Out    Hib vaccine  Aged Out    Meningococcal (ACWY) vaccine  Aged Out    Diabetes screen  Discontinued   Patient is a 61-year-old obese white female who presents for hyperlipidemia, prediabetes, impaired fasting glucose.   She also states that this past Friday she felt very tired and the next day, Saturday, she had a severe

## 2023-11-13 ENCOUNTER — OFFICE VISIT (OUTPATIENT)
Dept: FAMILY MEDICINE CLINIC | Age: 68
End: 2023-11-13

## 2023-11-13 VITALS
OXYGEN SATURATION: 97 % | DIASTOLIC BLOOD PRESSURE: 77 MMHG | HEART RATE: 72 BPM | SYSTOLIC BLOOD PRESSURE: 146 MMHG | TEMPERATURE: 97.7 F

## 2023-11-13 DIAGNOSIS — H93.8X3 EAR FULLNESS, BILATERAL: ICD-10-CM

## 2023-11-13 DIAGNOSIS — J40 BRONCHITIS: ICD-10-CM

## 2023-11-13 DIAGNOSIS — J45.31 MILD PERSISTENT ASTHMA WITH EXACERBATION: Primary | ICD-10-CM

## 2023-11-13 PROCEDURE — 99213 OFFICE O/P EST LOW 20 MIN: CPT | Performed by: NURSE PRACTITIONER

## 2023-11-13 PROCEDURE — 1123F ACP DISCUSS/DSCN MKR DOCD: CPT | Performed by: NURSE PRACTITIONER

## 2023-11-13 RX ORDER — PREDNISONE 20 MG/1
20 TABLET ORAL 2 TIMES DAILY
Qty: 10 TABLET | Refills: 0 | Status: SHIPPED | OUTPATIENT
Start: 2023-11-13 | End: 2023-11-18

## 2023-11-13 RX ORDER — AZITHROMYCIN 250 MG/1
250 TABLET, FILM COATED ORAL SEE ADMIN INSTRUCTIONS
Qty: 6 TABLET | Refills: 0 | Status: SHIPPED | OUTPATIENT
Start: 2023-11-13 | End: 2023-11-18

## 2023-11-13 ASSESSMENT — ENCOUNTER SYMPTOMS
HEMOPTYSIS: 0
SHORTNESS OF BREATH: 0
SORE THROAT: 0
CHEST TIGHTNESS: 0
CHOKING: 0
HEARTBURN: 0
RHINORRHEA: 1
STRIDOR: 0
WHEEZING: 0
COUGH: 1

## 2023-11-13 NOTE — PROGRESS NOTES
[address] [facility] [address] [facility] [address]  Dept: [phone]  Dept Fax: [phone]    Norman Valera is a 69 y.o. female who presents to the urgent care today for her medical conditions/complaints as notedbelow. Norman Valera is c/o of Congestion (Onset 1 month ) and Cough      HPI:     69 yr old female presents for cough and congestion 1 month  Hx asthma, allergies  Gets these same sx at least twice yearly  Started off as sinus infection first 2 weeks, now feels like cough getting a little worse  Inhaler helpful. Flonase helpful  Ear fullness    Cough  This is a new problem. The current episode started 1 to 4 weeks ago. The problem has been unchanged. The cough is Productive of sputum (clear to white). Associated symptoms include ear congestion, nasal congestion, postnasal drip and rhinorrhea. Pertinent negatives include no chest pain, chills, ear pain, fever, headaches, heartburn, hemoptysis, myalgias, rash, sore throat, shortness of breath, sweats, weight loss or wheezing. Nothing aggravates the symptoms. She has tried a beta-agonist inhaler (flonase) for the symptoms. Her past medical history is significant for asthma, bronchitis and environmental allergies. There is no history of pneumonia.        Past Medical History:   Diagnosis Date    Asthma 10/7/2013    Chronic rhinosinusitis 10/7/2013    Family history of diabetes mellitus (DM) 10/7/2013    Former smoker 10/7/2013        Current Outpatient Medications   Medication Sig Dispense Refill    predniSONE (DELTASONE) 20 MG tablet Take 1 tablet by mouth 2 times daily for 5 days 10 tablet 0    azithromycin (ZITHROMAX) 250 MG tablet Take 1 tablet by mouth See Admin Instructions for 5 days 500mg on day 1 followed by 250mg on days 2 - 5 6 tablet 0    fluticasone-salmeterol (ADVAIR) 250-50 MCG/ACT AEPB diskus inhaler Once daily-dr ligia carolina ([facility]) RN/Transport 100

## 2023-12-02 ENCOUNTER — OFFICE VISIT (OUTPATIENT)
Dept: FAMILY MEDICINE CLINIC | Age: 68
End: 2023-12-02

## 2023-12-02 VITALS
HEART RATE: 77 BPM | TEMPERATURE: 97.7 F | DIASTOLIC BLOOD PRESSURE: 65 MMHG | OXYGEN SATURATION: 97 % | SYSTOLIC BLOOD PRESSURE: 124 MMHG

## 2023-12-02 DIAGNOSIS — J01.00 ACUTE NON-RECURRENT MAXILLARY SINUSITIS: Primary | ICD-10-CM

## 2023-12-02 PROCEDURE — 99213 OFFICE O/P EST LOW 20 MIN: CPT | Performed by: FAMILY MEDICINE

## 2023-12-02 PROCEDURE — 1123F ACP DISCUSS/DSCN MKR DOCD: CPT | Performed by: FAMILY MEDICINE

## 2023-12-02 RX ORDER — AMOXICILLIN AND CLAVULANATE POTASSIUM 875; 125 MG/1; MG/1
1 TABLET, FILM COATED ORAL 2 TIMES DAILY
Qty: 20 TABLET | Refills: 0 | Status: SHIPPED | OUTPATIENT
Start: 2023-12-02 | End: 2023-12-12

## 2023-12-02 ASSESSMENT — ENCOUNTER SYMPTOMS
COUGH: 1
SORE THROAT: 1

## 2023-12-02 NOTE — PROGRESS NOTES
Cinthya Bedoya MD  [address] [facility],[address] HEALTH [facility] FAMILY MEDICINE  [address] [address]  Dept: [phone]    Saroj Ramirez is a 69 y.o. female who presents today for their medical conditions/complaints as noted below.   Saroj Ramirez is here today c/o Head Congestion (Onset 5 days ) and Otalgia       HPI:     HPI    Patient presents to the walk-in clinic for evaluation of ear pain  Was seen at the walk-in clinic 11/13 for asthma exacerbation, Rx given for prednisone, azithromycin; her symptoms from this infection completely resolved  5 days ago she noticed that her chronic cough was having more white sputum compared to usual clear sputum, she had a headache initially which soon resolved, some sinus pressure  She feels like her asthma is well-controlled, not using more albuterol than usual, no change in her shortness of breath, denies wheezing  Continues to use Advair, Flonase, Mucinex  Endorses sore throat, chills  Denies documented fever, ear discharge, nausea, vomiting, diarrhea  COVID test at home negative, declines repeat in clinic today      Patient Active Problem List   Diagnosis    Allergic rhinitis    Asthma    Family history of diabetes mellitus (DM)    Chronic rhinosinusitis    Former smoker    Enthesopathy of ankle and tarsus    Exostosis    Plantar fasciitis of right foot    Sprain of left lower leg    Pain of left lower extremity    Morbid obesity ([address])    IFG (impaired fasting glucose)    Prediabetes    Mixed hyperlipidemia       Past Medical History:   Diagnosis Date    Asthma 10/7/2013    Chronic rhinosinusitis 10/7/2013    Family history of diabetes mellitus (DM) 10/7/2013    Former smoker 10/7/2013     Past Surgical History:   Procedure Laterality Date    TONSILLECTOMY  1983     Family History   Problem Relation Age of Onset    Diabetes Mother     High Blood Pressure Mother     Diabetes Father     High Blood Pressure

## 2024-01-31 ENCOUNTER — TELEPHONE (OUTPATIENT)
Dept: FAMILY MEDICINE CLINIC | Age: 69
End: 2024-01-31

## 2024-01-31 RX ORDER — BENZONATATE 100 MG/1
CAPSULE ORAL
Qty: 30 CAPSULE | Refills: 1 | Status: SHIPPED | OUTPATIENT
Start: 2024-01-31

## 2024-01-31 NOTE — TELEPHONE ENCOUNTER
Patient called stating she has lingering cough. She coughs at night and it keeps her up.  She had Tessalon Perles that helped.  She is requesting Tessalon Perles to Umair Peters.  Please advise.

## 2024-02-17 ENCOUNTER — OFFICE VISIT (OUTPATIENT)
Dept: FAMILY MEDICINE CLINIC | Age: 69
End: 2024-02-17
Payer: MEDICARE

## 2024-02-17 VITALS
DIASTOLIC BLOOD PRESSURE: 84 MMHG | SYSTOLIC BLOOD PRESSURE: 143 MMHG | OXYGEN SATURATION: 98 % | HEART RATE: 89 BPM | TEMPERATURE: 97.7 F

## 2024-02-17 DIAGNOSIS — J01.90 ACUTE BACTERIAL RHINOSINUSITIS: Primary | ICD-10-CM

## 2024-02-17 DIAGNOSIS — J45.901 ASTHMA WITH ACUTE EXACERBATION, UNSPECIFIED ASTHMA SEVERITY, UNSPECIFIED WHETHER PERSISTENT: ICD-10-CM

## 2024-02-17 DIAGNOSIS — H69.93 EUSTACHIAN TUBE DYSFUNCTION, BILATERAL: ICD-10-CM

## 2024-02-17 DIAGNOSIS — B96.89 ACUTE BACTERIAL RHINOSINUSITIS: Primary | ICD-10-CM

## 2024-02-17 PROCEDURE — 3017F COLORECTAL CA SCREEN DOC REV: CPT

## 2024-02-17 PROCEDURE — G8417 CALC BMI ABV UP PARAM F/U: HCPCS

## 2024-02-17 PROCEDURE — G8484 FLU IMMUNIZE NO ADMIN: HCPCS

## 2024-02-17 PROCEDURE — 99214 OFFICE O/P EST MOD 30 MIN: CPT

## 2024-02-17 PROCEDURE — G8399 PT W/DXA RESULTS DOCUMENT: HCPCS

## 2024-02-17 PROCEDURE — 1036F TOBACCO NON-USER: CPT

## 2024-02-17 PROCEDURE — 1123F ACP DISCUSS/DSCN MKR DOCD: CPT

## 2024-02-17 PROCEDURE — 1090F PRES/ABSN URINE INCON ASSESS: CPT

## 2024-02-17 PROCEDURE — G8427 DOCREV CUR MEDS BY ELIG CLIN: HCPCS

## 2024-02-17 RX ORDER — PREDNISONE 20 MG/1
20 TABLET ORAL DAILY
Qty: 5 TABLET | Refills: 0 | Status: SHIPPED | OUTPATIENT
Start: 2024-02-17 | End: 2024-02-22

## 2024-02-17 RX ORDER — AMOXICILLIN AND CLAVULANATE POTASSIUM 875; 125 MG/1; MG/1
1 TABLET, FILM COATED ORAL 2 TIMES DAILY
Qty: 20 TABLET | Refills: 0 | Status: SHIPPED | OUTPATIENT
Start: 2024-02-17 | End: 2024-02-27

## 2024-02-17 NOTE — PATIENT INSTRUCTIONS
Finish the entire course of antibiotic treatment.  Complete short course of oral steroids.  Continue using inhalers as prescribed.  Push hydration.  Follow-up with PCP.

## 2024-02-17 NOTE — PROGRESS NOTES
[facility] WALK-IN FAMILY MEDICINE  [address] [address]    [facility] FAMILY MEDIC[facility]  [address]  [address]  [facility] [address]  Dept: [phone]    Hyun Najera is a 68 y.o. female Established patient, who presents to the walk-in clinic today with conditions/complaints as noted below:    Chief Complaint   Patient presents with    Cough     Onset 1 month +         HPI:     Patient is a 68-year-old female that presents today for a worsening cough. Pertinent PMH includes allergic rhinitis, chronic rhinosinusitis, and asthma. Reports that she's been sick since the beginning weeks of January and her cough seems to be getting \"deeper and deeper\" in her chest. States that her asthma has been worse; she's noticed audible wheezing and mildly increased dyspnea which is worse in the morning. She's been using her rescue inhaler more frequently; 3-4 times a day. Notes that both ears hurt this morning when she woke up. Reports head congestion, sinus pressure, and post-nasal drainage as well. No known fevers or chills. She's been using Claritin and Flonase daily.        Past Medical History:   Diagnosis Date    Asthma 10/7/2013    Chronic rhinosinusitis 10/7/2013    Family history of diabetes mellitus (DM) 10/7/2013    Former smoker 10/7/2013       Current Outpatient Medications   Medication Sig Dispense Refill    amoxicillin-clavulanate (AUGMENTIN) 875-125 MG per tablet Take 1 tablet by mouth 2 times daily for 10 days 20 tablet 0    predniSONE (DELTASONE) 20 MG tablet Take 1 tablet by mouth daily for 5 days 5 tablet 0    benzonatate (TESSALON PERLES) 100 MG capsule Take 1-2 capsules 3 times daily as needed for cough 30 capsule 1    fluticasone-salmeterol (ADVAIR) 250-50 MCG/ACT AEPB diskus inhaler Once daily-dr strong      meclizine (ANTIVERT) 25 MG tablet Take 1 tablet by

## 2024-02-23 ENCOUNTER — OFFICE VISIT (OUTPATIENT)
Dept: FAMILY MEDICINE CLINIC | Age: 69
End: 2024-02-23
Payer: MEDICARE

## 2024-02-23 VITALS
DIASTOLIC BLOOD PRESSURE: 78 MMHG | SYSTOLIC BLOOD PRESSURE: 122 MMHG | WEIGHT: 215.6 LBS | TEMPERATURE: 98.7 F | RESPIRATION RATE: 20 BRPM | HEART RATE: 69 BPM | BODY MASS INDEX: 42.11 KG/M2 | OXYGEN SATURATION: 99 %

## 2024-02-23 DIAGNOSIS — J30.9 ALLERGIC RHINITIS, UNSPECIFIED SEASONALITY, UNSPECIFIED TRIGGER: ICD-10-CM

## 2024-02-23 DIAGNOSIS — Z12.11 SCREEN FOR COLON CANCER: ICD-10-CM

## 2024-02-23 DIAGNOSIS — R73.9 HYPERGLYCEMIA: ICD-10-CM

## 2024-02-23 DIAGNOSIS — Z11.59 NEED FOR HEPATITIS C SCREENING TEST: ICD-10-CM

## 2024-02-23 DIAGNOSIS — Z91.81 AT HIGH RISK FOR FALLS: ICD-10-CM

## 2024-02-23 DIAGNOSIS — E66.01 MORBID OBESITY WITH BMI OF 40.0-44.9, ADULT (HCC): ICD-10-CM

## 2024-02-23 DIAGNOSIS — J45.21 MILD INTERMITTENT ASTHMA WITH ACUTE EXACERBATION: ICD-10-CM

## 2024-02-23 DIAGNOSIS — R73.01 IFG (IMPAIRED FASTING GLUCOSE): ICD-10-CM

## 2024-02-23 DIAGNOSIS — E78.2 MIXED HYPERLIPIDEMIA: Primary | ICD-10-CM

## 2024-02-23 PROBLEM — J44.9 CHRONIC OBSTRUCTIVE PULMONARY DISEASE, UNSPECIFIED COPD TYPE (HCC): Status: ACTIVE | Noted: 2024-02-23

## 2024-02-23 PROBLEM — J44.9 CHRONIC OBSTRUCTIVE PULMONARY DISEASE, UNSPECIFIED COPD TYPE (HCC): Status: RESOLVED | Noted: 2024-02-23 | Resolved: 2024-02-23

## 2024-02-23 PROCEDURE — 1090F PRES/ABSN URINE INCON ASSESS: CPT | Performed by: FAMILY MEDICINE

## 2024-02-23 PROCEDURE — 99214 OFFICE O/P EST MOD 30 MIN: CPT | Performed by: FAMILY MEDICINE

## 2024-02-23 PROCEDURE — G8484 FLU IMMUNIZE NO ADMIN: HCPCS | Performed by: FAMILY MEDICINE

## 2024-02-23 PROCEDURE — 1123F ACP DISCUSS/DSCN MKR DOCD: CPT | Performed by: FAMILY MEDICINE

## 2024-02-23 PROCEDURE — 3017F COLORECTAL CA SCREEN DOC REV: CPT | Performed by: FAMILY MEDICINE

## 2024-02-23 PROCEDURE — 1036F TOBACCO NON-USER: CPT | Performed by: FAMILY MEDICINE

## 2024-02-23 PROCEDURE — G8427 DOCREV CUR MEDS BY ELIG CLIN: HCPCS | Performed by: FAMILY MEDICINE

## 2024-02-23 PROCEDURE — G8399 PT W/DXA RESULTS DOCUMENT: HCPCS | Performed by: FAMILY MEDICINE

## 2024-02-23 PROCEDURE — G8417 CALC BMI ABV UP PARAM F/U: HCPCS | Performed by: FAMILY MEDICINE

## 2024-02-23 SDOH — ECONOMIC STABILITY: FOOD INSECURITY: WITHIN THE PAST 12 MONTHS, THE FOOD YOU BOUGHT JUST DIDN'T LAST AND YOU DIDN'T HAVE MONEY TO GET MORE.: NEVER TRUE

## 2024-02-23 SDOH — ECONOMIC STABILITY: FOOD INSECURITY: WITHIN THE PAST 12 MONTHS, YOU WORRIED THAT YOUR FOOD WOULD RUN OUT BEFORE YOU GOT MONEY TO BUY MORE.: NEVER TRUE

## 2024-02-23 SDOH — ECONOMIC STABILITY: INCOME INSECURITY: HOW HARD IS IT FOR YOU TO PAY FOR THE VERY BASICS LIKE FOOD, HOUSING, MEDICAL CARE, AND HEATING?: NOT HARD AT ALL

## 2024-02-23 ASSESSMENT — PATIENT HEALTH QUESTIONNAIRE - PHQ9
SUM OF ALL RESPONSES TO PHQ QUESTIONS 1-9: 0
2. FEELING DOWN, DEPRESSED OR HOPELESS: 0
1. LITTLE INTEREST OR PLEASURE IN DOING THINGS: 0
SUM OF ALL RESPONSES TO PHQ QUESTIONS 1-9: 0
SUM OF ALL RESPONSES TO PHQ9 QUESTIONS 1 & 2: 0
SUM OF ALL RESPONSES TO PHQ QUESTIONS 1-9: 0
SUM OF ALL RESPONSES TO PHQ QUESTIONS 1-9: 0

## 2024-02-23 ASSESSMENT — ENCOUNTER SYMPTOMS
BLOOD IN STOOL: 0
ABDOMINAL PAIN: 0
CHEST TIGHTNESS: 0
COUGH: 1
SHORTNESS OF BREATH: 0

## 2024-02-23 NOTE — PROGRESS NOTES
Diabetes screen  Discontinued   Patient is a 68-year-old obese white female who presents for hyperlipidemia, impaired fasting glucose, asthma.  She states she was recently seen at the walk-in clinic on 2/17/2024 and treated for sinusitis and exacerbation of her asthma.  She states she feels much better now and continues to take Augmentin as prescribed by walk-in clinic provider.  She states that her cough is improving and she takes Tessalon Perles as needed for cough.  She is taking and tolerating her routine medications.  She denies any fever, chills, chest pain, abdominal pain, shortness of breath.  She has a good appetite and remains active    Review of Systems   Constitutional:  Negative for chills and fever.   HENT:  Negative for congestion.    Respiratory:  Positive for cough (Improving). Negative for chest tightness and shortness of breath.    Cardiovascular:  Negative for chest pain.   Gastrointestinal:  Negative for abdominal pain and blood in stool.   Genitourinary:  Negative for dysuria and hematuria.   Skin:  Negative for rash.   Neurological:  Negative for dizziness.   Psychiatric/Behavioral:  Negative for confusion and dysphoric mood.        Objective:   Physical Exam  Vitals and nursing note reviewed.   Constitutional:       General: She is not in acute distress.     Appearance: She is well-developed.   HENT:      Head: Normocephalic and atraumatic.      Right Ear: Tympanic membrane, ear canal and external ear normal.      Left Ear: Tympanic membrane, ear canal and external ear normal.      Nose: Nose normal.      Mouth/Throat:      Mouth: Mucous membranes are moist.      Pharynx: Oropharynx is clear.   Eyes:      General: No scleral icterus.        Right eye: No discharge.         Left eye: No discharge.      Conjunctiva/sclera: Conjunctivae normal.   Cardiovascular:      Rate and Rhythm: Normal rate and regular rhythm.      Heart sounds: Normal heart sounds.   Pulmonary:      Effort: Pulmonary

## 2024-02-24 LAB
ALBUMIN SERPL-MCNC: 3.8 G/DL
ALP BLD-CCNC: 126 U/L
ALT SERPL-CCNC: 17 U/L
ANION GAP SERPL CALCULATED.3IONS-SCNC: 10 MMOL/L
ANTIBODY: NORMAL
AST SERPL-CCNC: 13 U/L
BILIRUB SERPL-MCNC: 0.4 MG/DL (ref 0.1–1.4)
BUN BLDV-MCNC: 12 MG/DL
CALCIUM SERPL-MCNC: 8 MG/DL
CHLORIDE BLD-SCNC: 105 MMOL/L
CHOLESTEROL, TOTAL: 189 MG/DL
CHOLESTEROL/HDL RATIO: 3.5
CO2: 27 MMOL/L
CREAT SERPL-MCNC: 0.85 MG/DL
EGFR: 74
ESTIMATED AVERAGE GLUCOSE: 134
GLUCOSE BLD-MCNC: 107 MG/DL
HBA1C MFR BLD: 6.3 %
HDLC SERPL-MCNC: 54 MG/DL (ref 35–70)
LDL CHOLESTEROL CALCULATED: 89 MG/DL (ref 0–160)
MAGNESIUM: 2 MG/DL
NONHDLC SERPL-MCNC: ABNORMAL MG/DL
POTASSIUM SERPL-SCNC: 3.7 MMOL/L
SODIUM BLD-SCNC: 142 MMOL/L
TOTAL PROTEIN: 6.3
TRIGL SERPL-MCNC: 229 MG/DL
TSH SERPL DL<=0.05 MIU/L-ACNC: 5.72 UIU/ML
VLDLC SERPL CALC-MCNC: 46 MG/DL

## 2024-02-26 DIAGNOSIS — R73.9 HYPERGLYCEMIA: ICD-10-CM

## 2024-02-26 DIAGNOSIS — Z11.59 NEED FOR HEPATITIS C SCREENING TEST: ICD-10-CM

## 2024-02-26 DIAGNOSIS — E78.2 MIXED HYPERLIPIDEMIA: ICD-10-CM

## 2024-02-26 DIAGNOSIS — E66.01 MORBID OBESITY WITH BMI OF 40.0-44.9, ADULT (HCC): ICD-10-CM

## 2024-02-26 DIAGNOSIS — R73.01 IFG (IMPAIRED FASTING GLUCOSE): ICD-10-CM

## 2024-03-01 DIAGNOSIS — E03.8 SUBCLINICAL HYPOTHYROIDISM: Primary | ICD-10-CM

## 2024-03-05 ENCOUNTER — OFFICE VISIT (OUTPATIENT)
Dept: FAMILY MEDICINE CLINIC | Age: 69
End: 2024-03-05
Payer: MEDICARE

## 2024-03-05 VITALS
SYSTOLIC BLOOD PRESSURE: 146 MMHG | RESPIRATION RATE: 16 BRPM | BODY MASS INDEX: 42.49 KG/M2 | TEMPERATURE: 97 F | WEIGHT: 216.4 LBS | DIASTOLIC BLOOD PRESSURE: 80 MMHG | HEIGHT: 60 IN | HEART RATE: 68 BPM

## 2024-03-05 DIAGNOSIS — Z00.00 MEDICARE ANNUAL WELLNESS VISIT, INITIAL: Primary | ICD-10-CM

## 2024-03-05 PROCEDURE — 3017F COLORECTAL CA SCREEN DOC REV: CPT | Performed by: FAMILY MEDICINE

## 2024-03-05 PROCEDURE — G0438 PPPS, INITIAL VISIT: HCPCS | Performed by: FAMILY MEDICINE

## 2024-03-05 PROCEDURE — G8484 FLU IMMUNIZE NO ADMIN: HCPCS | Performed by: FAMILY MEDICINE

## 2024-03-05 PROCEDURE — 1123F ACP DISCUSS/DSCN MKR DOCD: CPT | Performed by: FAMILY MEDICINE

## 2024-03-05 ASSESSMENT — PATIENT HEALTH QUESTIONNAIRE - PHQ9
2. FEELING DOWN, DEPRESSED OR HOPELESS: 0
SUM OF ALL RESPONSES TO PHQ9 QUESTIONS 1 & 2: 0
SUM OF ALL RESPONSES TO PHQ QUESTIONS 1-9: 0
SUM OF ALL RESPONSES TO PHQ QUESTIONS 1-9: 0
1. LITTLE INTEREST OR PLEASURE IN DOING THINGS: 0
SUM OF ALL RESPONSES TO PHQ QUESTIONS 1-9: 0
SUM OF ALL RESPONSES TO PHQ QUESTIONS 1-9: 0

## 2024-03-05 ASSESSMENT — LIFESTYLE VARIABLES: HOW OFTEN DO YOU HAVE A DRINK CONTAINING ALCOHOL: MONTHLY OR LESS

## 2024-03-05 NOTE — PROGRESS NOTES
Medicare Annual Wellness Visit    Hyun Najera is here for Medicare AWV    Assessment & Plan     Recommendations for Preventive Services Due: see orders and patient instructions/AVS.  Recommended screening schedule for the next 5-10 years is provided to the patient in written form: see Patient Instructions/AVS.     No follow-ups on file.  VIS for RSV and shingles vaccine given.     Subjective       Patient's complete Health Risk Assessment and screening values have been reviewed and are found in Flowsheets. The following problems were reviewed today and where indicated follow up appointments were made and/or referrals ordered.    Positive Risk Factor Screenings with Interventions:    Fall Risk:  Do you feel unsteady or are you worried about falling? : (!) yes  2 or more falls in past year?: (!) yes  Fall with injury in past year?: (!) yes       Interventions:    Reviewed medications, home hazards, visual acuity, and co-morbidities that can increase risk for falls  See AVS for additional education material             Activity, Diet, and Weight:  On average, how many days per week do you engage in moderate to strenuous exercise (like a brisk walk)?: 3 days  On average, how many minutes do you engage in exercise at this level?: 30 min    Do you eat balanced/healthy meals regularly?: Yes    Body mass index is 42.26 kg/m². (!) Abnormal      Obesity Interventions:  See AVS for additional education material  Exercises and activities reviewed with the patient.                 Advanced Directives:  Do you have a Living Will?: (!) No    Intervention:  Information regarding Advanced Directives reviewed and provided in AVS.                     Objective   Vitals:    03/05/24 1001   BP: (!) 146/80   Pulse: 68   Resp: 16   Temp: 97 °F (36.1 °C)   Weight: 98.2 kg (216 lb 6.4 oz)   Height: 1.524 m (5')      Body mass index is 42.26 kg/m².             Allergies   Allergen Reactions    Latex     Aleve [Naproxen Sodium] Other (See

## 2024-03-05 NOTE — PATIENT INSTRUCTIONS
your risk of heart disease by raising cholesterol levels.     Limit the amount of solid fat-butter, margarine, and shortening-you eat. Use olive, peanut, or canola oil when you cook. Bake, broil, and steam foods instead of frying them.     Eat a variety of fruit and vegetables every day. Dark green, deep orange, red, or yellow fruits and vegetables are especially good for you. Examples include spinach, carrots, peaches, and berries.     Foods high in fiber can reduce your cholesterol and provide important vitamins and minerals. High-fiber foods include whole-grain cereals and breads, oatmeal, beans, brown rice, citrus fruits, and apples.     Eat lean proteins. Heart-healthy proteins include seafood, lean meats and poultry, eggs, beans, peas, nuts, seeds, and soy products.     Limit drinks and foods with added sugar. These include candy, desserts, and soda pop.   Lifestyle changes    If your doctor recommends it, get more exercise. Walking is a good choice. Bit by bit, increase the amount you walk every day. Try for at least 30 minutes on most days of the week. You also may want to swim, bike, or do other activities.     Do not smoke. If you need help quitting, talk to your doctor about stop-smoking programs and medicines. These can increase your chances of quitting for good. Quitting smoking may be the most important step you can take to protect your heart. It is never too late to quit.     Limit alcohol to 2 drinks a day for men and 1 drink a day for women. Too much alcohol can cause health problems.     Manage other health problems such as diabetes, high blood pressure, and high cholesterol. If you think you may have a problem with alcohol or drug use, talk to your doctor.   Medicines    Take your medicines exactly as prescribed. Call your doctor if you think you are having a problem with your medicine.     If your doctor recommends aspirin, take the amount directed each day. Make sure you take aspirin and not

## 2024-03-06 DIAGNOSIS — Z12.11 SCREEN FOR COLON CANCER: ICD-10-CM

## 2024-03-06 LAB
CONTROL: PRESENT
FECAL BLOOD IMMUNOCHEMICAL TEST: POSITIVE

## 2024-03-06 PROCEDURE — 82274 ASSAY TEST FOR BLOOD FECAL: CPT | Performed by: FAMILY MEDICINE

## 2024-03-12 ENCOUNTER — APPOINTMENT (OUTPATIENT)
Dept: GENERAL RADIOLOGY | Age: 69
End: 2024-03-12
Payer: MEDICARE

## 2024-03-12 ENCOUNTER — APPOINTMENT (OUTPATIENT)
Dept: CT IMAGING | Age: 69
End: 2024-03-12
Payer: MEDICARE

## 2024-03-12 ENCOUNTER — HOSPITAL ENCOUNTER (EMERGENCY)
Age: 69
Discharge: HOME | End: 2024-03-12
Attending: EMERGENCY MEDICINE
Payer: MEDICARE

## 2024-03-12 VITALS
RESPIRATION RATE: 20 BRPM | HEIGHT: 60 IN | WEIGHT: 216 LBS | HEART RATE: 92 BPM | TEMPERATURE: 97.3 F | SYSTOLIC BLOOD PRESSURE: 156 MMHG | BODY MASS INDEX: 42.41 KG/M2 | DIASTOLIC BLOOD PRESSURE: 89 MMHG | OXYGEN SATURATION: 96 %

## 2024-03-12 DIAGNOSIS — M51.9 FORAMINAL STENOSIS DUE TO INTERVERTEBRAL DISC DISEASE: ICD-10-CM

## 2024-03-12 DIAGNOSIS — R91.1 LUNG NODULE: Primary | ICD-10-CM

## 2024-03-12 DIAGNOSIS — R93.89 THICKENED ENDOMETRIUM: ICD-10-CM

## 2024-03-12 DIAGNOSIS — M99.79 FORAMINAL STENOSIS DUE TO INTERVERTEBRAL DISC DISEASE: ICD-10-CM

## 2024-03-12 DIAGNOSIS — N28.89 NODULE OF KIDNEY: ICD-10-CM
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
## 2024-03-12 LAB
ANION GAP SERPL CALCULATED.3IONS-SCNC: 12 MMOL/L (ref 9–17)
BASOPHILS # BLD: 0 K/UL (ref 0–0.2)
BASOPHILS NFR BLD: 0 % (ref 0–2)
BUN SERPL-MCNC: 15 MG/DL (ref 8–23)
CALCIUM SERPL-MCNC: 8.8 MG/DL (ref 8.6–10.4)
CHLORIDE SERPL-SCNC: 103 MMOL/L (ref 98–107)
CO2 SERPL-SCNC: 25 MMOL/L (ref 20–31)
CREAT SERPL-MCNC: 1 MG/DL (ref 0.5–0.9)
EOSINOPHIL # BLD: 0.1 K/UL (ref 0–0.4)
EOSINOPHILS RELATIVE PERCENT: 1 % (ref 0–4)
ERYTHROCYTE [DISTWIDTH] IN BLOOD BY AUTOMATED COUNT: 13.5 % (ref 11.5–14.9)
GFR SERPL CREATININE-BSD FRML MDRD: >60 ML/MIN/1.73M2
GLUCOSE SERPL-MCNC: 158 MG/DL (ref 70–99)
HCT VFR BLD AUTO: 40.5 % (ref 36–46)
HGB BLD-MCNC: 13 G/DL (ref 12–16)
LYMPHOCYTES NFR BLD: 1.6 K/UL (ref 1–4.8)
LYMPHOCYTES RELATIVE PERCENT: 15 % (ref 24–44)
MCH RBC QN AUTO: 27.6 PG (ref 26–34)
MCHC RBC AUTO-ENTMCNC: 32.2 G/DL (ref 31–37)
MCV RBC AUTO: 85.8 FL (ref 80–100)
MONOCYTES NFR BLD: 0.9 K/UL (ref 0.1–1.3)
MONOCYTES NFR BLD: 8 % (ref 1–7)
NEUTROPHILS NFR BLD: 76 % (ref 36–66)
NEUTS SEG NFR BLD: 8.5 K/UL (ref 1.3–9.1)
PLATELET # BLD AUTO: 271 K/UL (ref 150–450)
PMV BLD AUTO: 7.2 FL (ref 6–12)
POTASSIUM SERPL-SCNC: 3.8 MMOL/L (ref 3.7–5.3)
RBC # BLD AUTO: 4.72 M/UL (ref 4–5.2)
SODIUM SERPL-SCNC: 140 MMOL/L (ref 135–144)
WBC OTHER # BLD: 11.2 K/UL (ref 3.5–11)

## 2024-03-12 PROCEDURE — 85025 COMPLETE CBC W/AUTO DIFF WBC: CPT

## 2024-03-12 PROCEDURE — 73130 X-RAY EXAM OF HAND: CPT

## 2024-03-12 PROCEDURE — 80048 BASIC METABOLIC PNL TOTAL CA: CPT

## 2024-03-12 PROCEDURE — 70450 CT HEAD/BRAIN W/O DYE: CPT

## 2024-03-12 PROCEDURE — 6360000004 HC RX CONTRAST MEDICATION: Performed by: STUDENT IN AN ORGANIZED HEALTH CARE EDUCATION/TRAINING PROGRAM

## 2024-03-12 PROCEDURE — 2580000003 HC RX 258: Performed by: STUDENT IN AN ORGANIZED HEALTH CARE EDUCATION/TRAINING PROGRAM

## 2024-03-12 PROCEDURE — 73110 X-RAY EXAM OF WRIST: CPT

## 2024-03-12 PROCEDURE — 72125 CT NECK SPINE W/O DYE: CPT

## 2024-03-12 PROCEDURE — 73090 X-RAY EXAM OF FOREARM: CPT

## 2024-03-12 PROCEDURE — 6370000000 HC RX 637 (ALT 250 FOR IP): Performed by: STUDENT IN AN ORGANIZED HEALTH CARE EDUCATION/TRAINING PROGRAM

## 2024-03-12 PROCEDURE — 36415 COLL VENOUS BLD VENIPUNCTURE: CPT

## 2024-03-12 PROCEDURE — 99285 EMERGENCY DEPT VISIT HI MDM: CPT

## 2024-03-12 PROCEDURE — 71260 CT THORAX DX C+: CPT

## 2024-03-12 PROCEDURE — 73590 X-RAY EXAM OF LOWER LEG: CPT

## 2024-03-12 RX ORDER — LIDOCAINE 4 G/G
1 PATCH TOPICAL DAILY
Qty: 7 PATCH | Refills: 0 | Status: SHIPPED | OUTPATIENT
Start: 2024-03-12 | End: 2024-03-19

## 2024-03-12 RX ORDER — 0.9 % SODIUM CHLORIDE 0.9 %
100 INTRAVENOUS SOLUTION INTRAVENOUS ONCE
Status: COMPLETED | OUTPATIENT
Start: 2024-03-12 | End: 2024-03-12

## 2024-03-12 RX ORDER — ACETAMINOPHEN 500 MG
1000 TABLET ORAL EVERY 8 HOURS PRN
Qty: 30 TABLET | Refills: 0 | Status: SHIPPED | OUTPATIENT
Start: 2024-03-12

## 2024-03-12 RX ORDER — ONDANSETRON 4 MG/1
4 TABLET, ORALLY DISINTEGRATING ORAL ONCE
Status: COMPLETED | OUTPATIENT
Start: 2024-03-12 | End: 2024-03-12

## 2024-03-12 RX ORDER — ACETAMINOPHEN 500 MG
1000 TABLET ORAL ONCE
Status: COMPLETED | OUTPATIENT
Start: 2024-03-12 | End: 2024-03-12

## 2024-03-12 RX ORDER — SODIUM CHLORIDE 0.9 % (FLUSH) 0.9 %
10 SYRINGE (ML) INJECTION PRN
Status: DISCONTINUED | OUTPATIENT
Start: 2024-03-12 | End: 2024-03-12 | Stop reason: HOSPADM

## 2024-03-12 RX ADMIN — ACETAMINOPHEN 1000 MG: 500 TABLET ORAL at 17:59

## 2024-03-12 RX ADMIN — IOPAMIDOL 100 ML: 755 INJECTION, SOLUTION INTRAVENOUS at 19:54

## 2024-03-12 RX ADMIN — SODIUM CHLORIDE, PRESERVATIVE FREE 10 ML: 5 INJECTION INTRAVENOUS at 19:54

## 2024-03-12 RX ADMIN — SODIUM CHLORIDE 100 ML: 9 INJECTION, SOLUTION INTRAVENOUS at 19:54

## 2024-03-12 RX ADMIN — ONDANSETRON 4 MG: 4 TABLET, ORALLY DISINTEGRATING ORAL at 18:00

## 2024-03-12 ASSESSMENT — PAIN DESCRIPTION - LOCATION: LOCATION: RIB CAGE;ARM;LEG

## 2024-03-12 ASSESSMENT — LIFESTYLE VARIABLES
HOW MANY STANDARD DRINKS CONTAINING ALCOHOL DO YOU HAVE ON A TYPICAL DAY: PATIENT DOES NOT DRINK
HOW OFTEN DO YOU HAVE A DRINK CONTAINING ALCOHOL: NEVER

## 2024-03-12 ASSESSMENT — PAIN SCALES - GENERAL
PAINLEVEL_OUTOF10: 3
PAINLEVEL_OUTOF10: 8
PAINLEVEL_OUTOF10: 8

## 2024-03-12 ASSESSMENT — PAIN DESCRIPTION - ORIENTATION: ORIENTATION: RIGHT

## 2024-03-12 ASSESSMENT — PAIN DESCRIPTION - DESCRIPTORS: DESCRIPTORS: DISCOMFORT

## 2024-03-12 ASSESSMENT — PAIN - FUNCTIONAL ASSESSMENT: PAIN_FUNCTIONAL_ASSESSMENT: 0-10

## 2024-03-12 NOTE — ED NOTES
Blood drawn for vancomycin level   Report given to WILMA Sosa from ED.   Report method in person   The following was reviewed with receiving RN:   Current vital signs:  BP (!) 156/89   Pulse 92   Temp 97.3 °F (36.3 °C) (Temporal)   Resp 20   Ht 1.524 m (5')   Wt 98 kg (216 lb)   SpO2 96%   BMI 42.18 kg/m²                MEWS Score: 1     Any medication or safety alerts were reviewed. Any pending diagnostics and notifications were also reviewed, as well as any safety concerns or issues, abnormal labs, abnormal imaging, and abnormal assessment findings. Questions were answered.   .

## 2024-03-12 NOTE — ED PROVIDER NOTES
[facility] ED  Emergency Department Encounter  Emergency Medicine Resident     Pt Name:Hyun Najera  MRN: [id number]  Birthdate 1955  Date of evaluation: 3/12/24  PCP:  Irwin Sánchez MD  Note Started: 5:02 PM EDT      CHIEF COMPLAINT       Chief Complaint   Patient presents with    Fall    Rib Pain (injury)    Hand Injury     RT     Back Pain       HISTORY OF PRESENT ILLNESS  (Location/Symptom, Timing/Onset, Context/Setting, Quality, Duration, Modifying Factors, Severity.)      Hyun Najera is a 68 y.o. female past medical history of osteopenia, diabetes, presenting for assessment of multiple arthralgias.  Onset was approximately 3:00 this afternoon.  Patient states she was walking her dog when it saw another dog across the street and pulled her abruptly.  She fell onto her right side.  She believes she was dazed and has difficulty remembering the exact events, but is denying head trauma.  She is not on any blood thinners.  She is not complaining of pain to the right chest wall, right upper quadrant, right forearm wrist and hand, as well as right calf.  No medications taken for symptoms at this time    PAST MEDICAL / SURGICAL / SOCIAL / FAMILY HISTORY      has a past medical history of Asthma, Chronic rhinosinusitis, Family history of diabetes mellitus (DM), and Former smoker.       has a past surgical history that includes Tonsillectomy ().      Social History     Socioeconomic History    Marital status:      Spouse name: Not on file    Number of children: Not on file    Years of education: Not on file    Highest education level: Not on file   Occupational History    Not on file   Tobacco Use    Smoking status: Former     Current packs/day: 0.00     Average packs/day: 0.6 packs/day for 7.0 years (4.2 ttl pk-yrs)     Types: Cigarettes     Start date: 1985     Quit date: 1992     Years since quittin.2    Smokeless tobacco: Former   Vaping Use    Vaping Use: Never

## 2024-03-12 NOTE — ED PROVIDER NOTES
[facility] ED  eMERGENCY dEPARTMENT eNCOUnter   Attending Attestation     Pt Name: Hyun Najera  MRN: [id number]  Birthdate 1955  Date of evaluation: 3/12/24    History, EXAM, MDM:    Hyun Najera is a 68 y.o. female who presents with Fall, Rib Pain (injury), Hand Injury (RT ), and Back Pain  Walking dog, trip, fall onto concrete.     Patient complaining of rib pain and right hand pain back pain.The patient is not on anticoagulation.  There is no loss of consciousness, differential diagnosis concussion intracranial injury spine fracture rib fracture pneumothorax hand fracture wrist fracture arm fracture.  X-rays CT imaging obtained laboratory studies obtained no fracture in the tibia or the fibula no radial or ulnar fracture no fracture in the wrist no hand fracture.  CT scan of the head shows no intracranial hemorrhage cervical spine thoracic spine lumbar spine show no acute fractures.  No rib fractures seen on CT scan of the chest.  Incidental pulmonary nodule identified patient informed and she will follow-up outpatient appropriately.  Patient was provided analgesics and antiemetics.  At this time I believe the patient is stable to be followed up with her outpatient provider.  For analgesics sent to the patient's pharmacy.    Vitals:   Vitals:    03/12/24 1607 03/12/24 1718 03/12/24 1801 03/12/24 1805   BP: (!) 169/81 (!) 178/70  (!) 156/89   Pulse: 67   92   Resp: 18   20   Temp: 97.3 °F (36.3 °C)      TempSrc: Temporal      SpO2: 97% 98%  96%   Weight: 98 kg (216 lb)  98 kg (216 lb)    Height:   1.524 m (5')      I performed a history and physical examination of the patient and discussed management with the resident. I reviewed the resident’s note and agree with the documented findings and plan of care. Any areas of disagreement are noted on the chart. I was personally present for the key portions of any procedures. I have documented in the chart those procedures where I was not present during the cedillo

## 2024-03-12 NOTE — ED TRIAGE NOTES
Mode of arrival (squad #, walk in, police, etc) : walk in         Chief complaint(s): fall, back pain, rib pain, Rt hand pain         Arrival Note (brief scenario, treatment PTA, etc).: Pt states her dog started to get in a fight with another dog and she got dragged down to the ground. Pt states Rt side rib pain and rt hand pain. Pt denies any head injury.         C= \"Have you ever felt that you should Cut down on your drinking?\"  No  A= \"Have people Annoyed you by criticizing your drinking?\"  No  G= \"Have you ever felt bad or Guilty about your drinking?\"  No  E= \"Have you ever had a drink as an Eye-opener first thing in the morning to steady your nerves or to help a hangover?\"  No      Deferred []      Reason for deferring: N/A    *If yes to two or more: probable alcohol abuse.*

## 2024-03-13 NOTE — DISCHARGE INSTRUCTIONS
You have been seen in the emergency department today due to a fall.  There were no fractures or traumatic injuries noted on your imaging studies.  Your imaging studies did however demonstrate some incidental findings.    Lung nodule-please bring this up with your PCP.  You will require repeat imaging in 3 to 6 months.    Right renal nodule-please bring this up with your PCP.  This will require further imaging, CT or MRI per the report provided by the radiologist.    Thickened endometrium-you have been referred to OB/GYN for this problem.  Please follow-up with them to determine if further imaging or tissue sampling is required    Foraminal stenosis at L5-S1-this could be secondary to degenerative changes and aging.  If you develop any weakness in your legs, numbness in the groin and saddle area, difficulty with bowel or bladder movements, return to the ER immediately.  We would otherwise recommend that you contact neurosurgery for this to assess if you may benefit from surgery.    If you have any urgent medical concerns return to the emergency department immediately for reassessment

## 2024-03-18 ENCOUNTER — OFFICE VISIT (OUTPATIENT)
Dept: FAMILY MEDICINE CLINIC | Age: 69
End: 2024-03-18
Payer: MEDICARE

## 2024-03-18 VITALS
TEMPERATURE: 97.4 F | BODY MASS INDEX: 41.68 KG/M2 | RESPIRATION RATE: 20 BRPM | HEART RATE: 72 BPM | WEIGHT: 213.4 LBS | OXYGEN SATURATION: 95 % | SYSTOLIC BLOOD PRESSURE: 136 MMHG | DIASTOLIC BLOOD PRESSURE: 80 MMHG

## 2024-03-18 DIAGNOSIS — R19.5 POSITIVE FIT (FECAL IMMUNOCHEMICAL TEST): ICD-10-CM

## 2024-03-18 DIAGNOSIS — K40.90 UNILATERAL INGUINAL HERNIA WITHOUT OBSTRUCTION OR GANGRENE, RECURRENCE NOT SPECIFIED: ICD-10-CM

## 2024-03-18 DIAGNOSIS — K80.20 CALCULUS OF GALLBLADDER WITHOUT CHOLECYSTITIS WITHOUT OBSTRUCTION: ICD-10-CM

## 2024-03-18 DIAGNOSIS — K40.90 RIGHT INGUINAL HERNIA: ICD-10-CM

## 2024-03-18 DIAGNOSIS — R10.11 RUQ ABDOMINAL PAIN: Primary | ICD-10-CM

## 2024-03-18 DIAGNOSIS — R91.1 NODULE OF LOWER LOBE OF RIGHT LUNG: ICD-10-CM

## 2024-03-18 DIAGNOSIS — S60.511A ABRASION OF SKIN OF RIGHT HAND: ICD-10-CM

## 2024-03-18 DIAGNOSIS — S60.221A CONTUSION OF RIGHT HAND, INITIAL ENCOUNTER: ICD-10-CM

## 2024-03-18 DIAGNOSIS — R93.89 THICKENED ENDOMETRIUM: ICD-10-CM

## 2024-03-18 DIAGNOSIS — E27.8 ADRENAL NODULE (HCC): ICD-10-CM

## 2024-03-18 DIAGNOSIS — K80.20 CALCULUS OF GALLBLADDER WITHOUT CHOLECYSTITIS WITHOUT OBSTRUCTION: Primary | ICD-10-CM

## 2024-03-18 PROBLEM — Z86.16 PERSONAL HISTORY OF COVID-19: Status: ACTIVE | Noted: 2024-03-18

## 2024-03-18 PROCEDURE — 1036F TOBACCO NON-USER: CPT | Performed by: NURSE PRACTITIONER

## 2024-03-18 PROCEDURE — 99214 OFFICE O/P EST MOD 30 MIN: CPT | Performed by: NURSE PRACTITIONER

## 2024-03-18 PROCEDURE — G8484 FLU IMMUNIZE NO ADMIN: HCPCS | Performed by: NURSE PRACTITIONER

## 2024-03-18 PROCEDURE — G8417 CALC BMI ABV UP PARAM F/U: HCPCS | Performed by: NURSE PRACTITIONER

## 2024-03-18 PROCEDURE — 1090F PRES/ABSN URINE INCON ASSESS: CPT | Performed by: NURSE PRACTITIONER

## 2024-03-18 PROCEDURE — G8399 PT W/DXA RESULTS DOCUMENT: HCPCS | Performed by: NURSE PRACTITIONER

## 2024-03-18 PROCEDURE — G8427 DOCREV CUR MEDS BY ELIG CLIN: HCPCS | Performed by: NURSE PRACTITIONER

## 2024-03-18 PROCEDURE — 1123F ACP DISCUSS/DSCN MKR DOCD: CPT | Performed by: NURSE PRACTITIONER

## 2024-03-18 PROCEDURE — 3017F COLORECTAL CA SCREEN DOC REV: CPT | Performed by: NURSE PRACTITIONER

## 2024-03-18 ASSESSMENT — ENCOUNTER SYMPTOMS
SHORTNESS OF BREATH: 0
CHEST TIGHTNESS: 0
VOMITING: 0
ABDOMINAL PAIN: 1
NAUSEA: 0

## 2024-03-18 ASSESSMENT — PATIENT HEALTH QUESTIONNAIRE - PHQ9
1. LITTLE INTEREST OR PLEASURE IN DOING THINGS: NOT AT ALL
SUM OF ALL RESPONSES TO PHQ QUESTIONS 1-9: 1
2. FEELING DOWN, DEPRESSED OR HOPELESS: SEVERAL DAYS
SUM OF ALL RESPONSES TO PHQ9 QUESTIONS 1 & 2: 1
SUM OF ALL RESPONSES TO PHQ QUESTIONS 1-9: 1

## 2024-03-18 NOTE — PROGRESS NOTES
02/24/2024    ALT 17 02/24/2024    AST 13 02/24/2024     03/12/2024    K 3.8 03/12/2024     03/12/2024    CREATININE 1.0 (H) 03/12/2024    BUN 15 03/12/2024    CO2 25 03/12/2024    TSH 5.72 (A) 02/24/2024    LABA1C 6.3 (A) 02/24/2024     Lab Results   Component Value Date    CALCIUM 8.8 03/12/2024     Lab Results   Component Value Date    LDLCALC 89 02/24/2024         1. RUQ abdominal pain/ 2. Calculus of gallbladder without cholecystitis without obstruction/ 3. Right inguinal hernia  - continue current pain medications   - refer pt to  for surgical consultation     4. Nodule of lower lobe of right lung/ 5. Adrenal nodule (HCC)  - continue to monitor   - repeat CT chest /abd in 3 months     6. Thickened endometrium  - refer pt to gyn for further management   - Myla Nicholson, FRANCY, OB/GYN, [place]    7. Abrasion of skin of right hand/ 8. Contusion of right hand, initial encounter  - continue tylenol as needed and lidocaine patches   - advised warm moist compresses         Requested Prescriptions      No prescriptions requested or ordered in this encounter       Medications Discontinued During This Encounter   Medication Reason    lidocaine 4 % external patch LIST CLEANUP     Discussed use, benefit, and side effects of prescribed medications.  Barriers to medication compliance addressed.      All patient questions answered.  Pt voiced understanding.     Return in about 3 months (around 6/19/2024) for lung nodule, adrenal nodule, .

## 2024-03-20 ENCOUNTER — TELEPHONE (OUTPATIENT)
Age: 69
End: 2024-03-20

## 2024-03-21 ENCOUNTER — HOSPITAL ENCOUNTER (OUTPATIENT)
Age: 69
Setting detail: SPECIMEN
Discharge: HOME | End: 2024-03-21

## 2024-03-21 ENCOUNTER — HOSPITAL ENCOUNTER (OUTPATIENT)
Dept: GENERAL RADIOLOGY | Facility: CLINIC | Age: 69
Discharge: HOME | End: 2024-03-23
Payer: MEDICARE

## 2024-03-21 ENCOUNTER — OFFICE VISIT (OUTPATIENT)
Age: 69
End: 2024-03-21
Payer: MEDICARE

## 2024-03-21 ENCOUNTER — HOSPITAL ENCOUNTER (OUTPATIENT)
Facility: CLINIC | Age: 69
Discharge: HOME | End: 2024-03-23
Payer: MEDICARE

## 2024-03-21 VITALS
OXYGEN SATURATION: 95 % | SYSTOLIC BLOOD PRESSURE: 137 MMHG | WEIGHT: 216 LBS | DIASTOLIC BLOOD PRESSURE: 87 MMHG | HEIGHT: 60 IN | BODY MASS INDEX: 42.41 KG/M2 | HEART RATE: 90 BPM | TEMPERATURE: 97 F

## 2024-03-21 DIAGNOSIS — E27.8 ADRENAL NODULE (HCC): ICD-10-CM

## 2024-03-21 DIAGNOSIS — Z91.81 STATUS POST FALL: ICD-10-CM

## 2024-03-21 DIAGNOSIS — R07.9 RIGHT-SIDED CHEST PAIN: ICD-10-CM

## 2024-03-21 DIAGNOSIS — R93.89 THICKENED ENDOMETRIUM: ICD-10-CM

## 2024-03-21 DIAGNOSIS — K81.1 CHRONIC CHOLECYSTITIS: ICD-10-CM

## 2024-03-21 DIAGNOSIS — K40.90 NON-RECURRENT INGUINAL HERNIA WITHOUT OBSTRUCTION OR GANGRENE, UNSPECIFIED LATERALITY: ICD-10-CM

## 2024-03-21 DIAGNOSIS — K42.9 UMBILICAL HERNIA WITHOUT OBSTRUCTION OR GANGRENE: ICD-10-CM

## 2024-03-21 DIAGNOSIS — R91.1 PULMONARY NODULE: Primary | ICD-10-CM

## 2024-03-21 DIAGNOSIS — E03.8 SUBCLINICAL HYPOTHYROIDISM: ICD-10-CM

## 2024-03-21 LAB
T4 FREE SERPL-MCNC: 1.1 NG/DL (ref 0.93–1.7)
TSH SERPL DL<=0.05 MIU/L-ACNC: 3.14 UIU/ML (ref 0.27–4.2)

## 2024-03-21 PROCEDURE — 3017F COLORECTAL CA SCREEN DOC REV: CPT | Performed by: SURGERY

## 2024-03-21 PROCEDURE — G8484 FLU IMMUNIZE NO ADMIN: HCPCS | Performed by: SURGERY

## 2024-03-21 PROCEDURE — 1123F ACP DISCUSS/DSCN MKR DOCD: CPT | Performed by: SURGERY

## 2024-03-21 PROCEDURE — 99204 OFFICE O/P NEW MOD 45 MIN: CPT | Performed by: SURGERY

## 2024-03-21 PROCEDURE — G8427 DOCREV CUR MEDS BY ELIG CLIN: HCPCS | Performed by: SURGERY

## 2024-03-21 PROCEDURE — 1090F PRES/ABSN URINE INCON ASSESS: CPT | Performed by: SURGERY

## 2024-03-21 PROCEDURE — G8399 PT W/DXA RESULTS DOCUMENT: HCPCS | Performed by: SURGERY

## 2024-03-21 PROCEDURE — 71046 X-RAY EXAM CHEST 2 VIEWS: CPT

## 2024-03-21 PROCEDURE — 1036F TOBACCO NON-USER: CPT | Performed by: SURGERY

## 2024-03-21 PROCEDURE — G8417 CALC BMI ABV UP PARAM F/U: HCPCS | Performed by: SURGERY

## 2024-03-21 NOTE — PATIENT INSTRUCTIONS
Complete chest x-ray downstairs after appointment.  The office will be calling you soon to schedule robotic cholecystectomy, please call our office if you do not receive a phone call within 1 week.

## 2024-03-22 DIAGNOSIS — R91.1 LUNG NODULE: Primary | ICD-10-CM

## 2024-03-27 ENCOUNTER — TELEPHONE (OUTPATIENT)
Age: 69
End: 2024-03-27

## 2024-03-27 NOTE — TELEPHONE ENCOUNTER
PATIENT RETURNED CALL TO SCHEDULE SURGERY.    TM/ SC. WEDNESDAY 4/3/2024 at 1:15 pm/ JB BELLA/ ONEAL    PATIENT FULLY INSTRUCTED      [facility]   Oswaldo Grewal MD, FACS  Nara Cunningham, APRN-CNP  [address], [address]  [place] [address]  P: [phone], F: [phone]      STOP ASPIRIN 7 DAYS PRIOR TO PROCEDURE  STOP ALL OTHER BLOOD THINNERS 5 DAYS PRIOR TO PROCEDURE  NOTHING TO EAT, DRINK, SMOKE, OR CHEW AFTER MIDNIGHT  You may brush your teeth, rinse gargle, and spit  Heart or blood pressure medication ONLY with a  small sip of water, unless otherwise directed  Shower with regular soap and water  YOU MUST HAVE AN ADULT EITHER DRIVE YOU OR RIDE IN A CAB WITH YOU            MY EXAM IS SCHEDULED FOR;      Pre-testing: [id number] at 1:45 pm AT [facility]    SURGERY DATE:  4/3/2024    TIME:  1:15 PM    ARRIVAL TIME:  11:15 AM    LOCATION:  [facility]    Post Op appointment at [facility]:  4/18/2024  10:00 AM

## 2024-04-01 ENCOUNTER — HOSPITAL ENCOUNTER (OUTPATIENT)
Dept: NUCLEAR MEDICINE | Age: 69
Discharge: HOME | End: 2024-04-03
Attending: INTERNAL MEDICINE
Payer: MEDICARE

## 2024-04-01 ENCOUNTER — HOSPITAL ENCOUNTER (OUTPATIENT)
Dept: PREADMISSION TESTING | Age: 69
Setting detail: OUTPATIENT SURGERY
Discharge: HOME | End: 2024-04-05
Payer: MEDICARE

## 2024-04-01 VITALS
WEIGHT: 213 LBS | RESPIRATION RATE: 16 BRPM | HEIGHT: 60 IN | HEART RATE: 87 BPM | BODY MASS INDEX: 41.82 KG/M2 | TEMPERATURE: 97.9 F | DIASTOLIC BLOOD PRESSURE: 81 MMHG | OXYGEN SATURATION: 96 % | SYSTOLIC BLOOD PRESSURE: 152 MMHG

## 2024-04-01 DIAGNOSIS — R91.1 LUNG NODULE: ICD-10-CM

## 2024-04-01 DIAGNOSIS — Z01.818 PREOP EXAMINATION: Primary | ICD-10-CM

## 2024-04-01 LAB — GLUCOSE BLD-MCNC: 98 MG/DL (ref 65–105)

## 2024-04-01 PROCEDURE — 82947 ASSAY GLUCOSE BLOOD QUANT: CPT

## 2024-04-01 PROCEDURE — APPSS45 APP SPLIT SHARED TIME 31-45 MINUTES: Performed by: NURSE PRACTITIONER

## 2024-04-01 PROCEDURE — 2580000003 HC RX 258: Performed by: INTERNAL MEDICINE

## 2024-04-01 PROCEDURE — 3430000000 HC RX DIAGNOSTIC RADIOPHARMACEUTICAL: Performed by: INTERNAL MEDICINE

## 2024-04-01 PROCEDURE — 78815 PET IMAGE W/CT SKULL-THIGH: CPT

## 2024-04-01 PROCEDURE — A9609 HC RX DIAGNOSTIC RADIOPHARMACEUTICAL: HCPCS | Performed by: INTERNAL MEDICINE

## 2024-04-01 RX ORDER — FLUDEOXYGLUCOSE F 18 200 MCI/ML
10 INJECTION, SOLUTION INTRAVENOUS
Status: COMPLETED | OUTPATIENT
Start: 2024-04-01 | End: 2024-04-01

## 2024-04-01 RX ORDER — SODIUM CHLORIDE 0.9 % (FLUSH) 0.9 %
10 SYRINGE (ML) INJECTION ONCE
Status: COMPLETED | OUTPATIENT
Start: 2024-04-01 | End: 2024-04-01

## 2024-04-01 RX ADMIN — FLUDEOXYGLUCOSE F 18 11.83 MILLICURIE: 200 INJECTION, SOLUTION INTRAVENOUS at 07:58

## 2024-04-01 RX ADMIN — SODIUM CHLORIDE, PRESERVATIVE FREE 10 ML: 5 INJECTION INTRAVENOUS at 07:59

## 2024-04-01 ASSESSMENT — PAIN DESCRIPTION - ORIENTATION: ORIENTATION: RIGHT

## 2024-04-01 ASSESSMENT — PAIN DESCRIPTION - LOCATION: LOCATION: ABDOMEN

## 2024-04-01 ASSESSMENT — ENCOUNTER SYMPTOMS
VOMITING: 1
NAUSEA: 1
RESPIRATORY NEGATIVE: 1

## 2024-04-01 ASSESSMENT — PAIN DESCRIPTION - PAIN TYPE: TYPE: ACUTE PAIN

## 2024-04-01 ASSESSMENT — PAIN DESCRIPTION - DESCRIPTORS: DESCRIPTORS: ACHING

## 2024-04-01 ASSESSMENT — PAIN SCALES - GENERAL: PAINLEVEL_OUTOF10: 5

## 2024-04-01 NOTE — H&P (VIEW-ONLY)
HISTORY and PHYSICAL  [facility]       NAME:  Hyun Najera  MRN: [id number]   YOB: 1955   Date: 4/1/2024   Age: 68 y.o.  Gender: female     COMPLAINT AND PRESENT HISTORY:   Hyun Najera is 68 y.o.,  female, presents for pre-anesthesia/admission testing for CHOLECYSTECTOMY LAPAROSCOPIC ROBOTIC XI per Dr. ACKERMAN.    Primary dx: Chronic cholecystitis [K81.1]  Calculus of gallbladder with chronic cholecystitis without obstruction [K80.10].    HPI:  Hyun Najera is 68 y.o.,  female, states  she was in the ER  on 3/12/24 due to fall on her right side/ on her back and on her right hand while walking with her dog.   She had CT scan Head/ cervical/thoracic and Lumber. CT thoracis/Lumbar  showed cholelithiasis and  fat-containing umbilical and inguinal hernia. Pt states she did not have any pain in her RUQ before , but couple days after she went home from ER, she felt constant aching pain in the RUQ radiates to RT back but not to the shoulder. Pt sates she nausea and she vomited only one time. She notice her stool color change to tan color. Pt states her pain aggravated by lying down on her right side. Pain rated 5/10. She denies diarrhea or constipation   Pt denies fever/chills, chest pain or SOB.     RECENT IMAGING R/T HPI     CT CHEST ABDOMEN PELVIS W CONTRAST Additional Contrast? None    Result Date: 3/12/2024  1. No evidence of acute traumatic injury of the chest, abdomen, pelvis, thoracic spine or lumbar spine. 2. Right lower lobe solid pulmonary nodule measures up to 1.4 cm. Per Fleischner Society Guidelines, recommend a non-contrast Chest CT at 3-6 months. Subsequent management based on the most suspicious nodule(s). 3. Indeterminate right adrenal nodule measuring up to 3.3 cm.  CT washout [place] versus MRI may be considered for further evaluation. 4. Thickened endometrium measuring up to 1 cm in maximum thickness.  Please correlate with tissue sampling. 5. Cholelithiasis. 6.  Tenderness: There is abdominal tenderness (with palpation over the RUQ).   Musculoskeletal:         General: No deformity or signs of injury. Normal range of motion.      Cervical back: Normal range of motion and neck supple.      Comments: Right hand normal ROM, there is swelling ,ecchymosis and abrasions with scabs on the right hand   Skin:     General: Skin is warm and dry.      Findings: Bruising present. No lesion or rash.      Comments: There is  abrasions and bruising on right hand   Neurological:      General: No focal deficit present.      Mental Status: She is alert and oriented to person, place, and time.      Motor: No weakness.      Gait: Gait normal.   Psychiatric:         Mood and Affect: Mood normal.         Behavior: Behavior normal.         LAB REVIEW     Lab Results   Component Value Date    WBC 11.2 (H) 03/12/2024    HGB 13.0 03/12/2024    HCT 40.5 03/12/2024    MCV 85.8 03/12/2024     03/12/2024     Lab Results   Component Value Date/Time     03/12/2024 06:46 PM    K 3.8 03/12/2024 06:46 PM     03/12/2024 06:46 PM    CO2 25 03/12/2024 06:46 PM    BUN 15 03/12/2024 06:46 PM    CREATININE 1.0 03/12/2024 06:46 PM    GLUCOSE 158 03/12/2024 06:46 PM    CALCIUM 8.8 03/12/2024 06:46 PM    LABGLOM >60 03/12/2024 06:46 PM    LABGLOM 74 02/24/2024 12:00 AM          SURGERY / PROVISIONAL DIAGNOSES:      CHOLECYSTECTOMY LAPAROSCOPIC ROBOTIC XI    Chronic cholecystitis [K81.1]  Calculus of gallbladder with chronic cholecystitis without obstruction [K80.10]    Patient Active Problem List    Diagnosis Date Noted    Morbid obesity with BMI of 40.0-44.9, adult (HCC) 08/14/2022    Sprain of left lower leg 10/07/2017    Pain of left lower extremity 10/07/2017    Personal history of COVID-19 03/18/2024    IFG (impaired fasting glucose) 08/22/2023    Prediabetes 08/22/2023    Mixed hyperlipidemia 08/22/2023    Enthesopathy of ankle and tarsus 09/23/2015    Exostosis 09/23/2015    Plantar fasciitis

## 2024-04-01 NOTE — DISCHARGE INSTRUCTIONS
Pre-op Instructions For Out-Patient Surgery    Medication Instructions:  Please stop herbs and any supplements now (includes vitamins and minerals).    Please contact your surgeon and prescribing physician for pre-op instructions for any blood thinners.    If you have inhalers/aerosol treatments at home, please use them the morning of your surgery and bring the inhalers with you to the hospital.    Please take the following medications the morning of your surgery with a sip of water:    inhaler    Surgery Instructions:  After midnight before surgery:  Do not eat or drink anything, including water, mints, gum, and hard candy.  You may brush your teeth without swallowing.  No smoking, chewing tobacco, or street drugs.    Please shower or bathe before surgery.  If you were given Surgical Scrub Chlorhexidine Gluconate Liquid (CHG), please shower the night before and the morning of your surgery following the detailed instructions you received during your pre-admission visit.     Please do not wear any cologne, lotion, powder, deodorant, jewelry, piercings, perfume, makeup, nail polish, hair accessories, or hair spray on the day of surgery.  Wear loose comfortable clothing.    Leave your valuables at home but bring a payment source for any after-surgery prescriptions you plan to fill at [facility].  Bring a storage case for any glasses/contacts.    An adult who is responsible for you MUST drive you home and should be with you for the first 24 hours after surgery.     If having out-patient knee and foot surgeries, please arrange for planned crutches, walker, or wheelchair before arriving to the hospital.    The Day of Surgery:  Arrive at [facility] Surgery Entrance at the time directed by your surgeon and check in at the desk.     If you have a living will or healthcare power of , please bring a copy.    You will be taken to the pre-op holding area where you will

## 2024-04-01 NOTE — H&P
(FLONASE) 50 MCG/ACT nasal spray 2 sprays by Nasal route daily 16 g 0    Loratadine (CLARITIN PO) Take 10 mg by mouth daily      albuterol sulfate HFA (PROAIR HFA) 108 (90 BASE) MCG/ACT inhaler Inhale 2 puffs into the lungs every 6 hours as needed for Wheezing 1 Inhaler 1     No current facility-administered medications on file prior to encounter.       Review of Systems   Constitutional:  Positive for activity change.   HENT: Negative.           Has full denture    Eyes:  Positive for visual disturbance (wearing eye glasses).   Respiratory: Negative.     Cardiovascular: Negative.    Gastrointestinal:  Positive for abdominal pain (RUQ pain), nausea and vomiting.   Genitourinary: Negative.    Musculoskeletal: Negative.         Right ribs , right wrist pain    Skin: Negative.    Neurological: Negative.    Hematological: Negative.    Psychiatric/Behavioral: Negative.         GENERAL PHYSICAL EXAM     Vitals: BP (!) 152/81   Pulse 87   Temp 97.9 °F (36.6 °C) (Infrared)   Resp 16   Ht 1.524 m (5')   Wt 96.6 kg (213 lb)   SpO2 96%   BMI 41.60 kg/m²               Physical Exam  Constitutional:       General: She is not in acute distress.     Appearance: Normal appearance. She is obese. She is not ill-appearing.   HENT:      Head: Normocephalic.      Right Ear: External ear normal.      Left Ear: External ear normal.      Nose: Nose normal.      Mouth/Throat:      Mouth: Mucous membranes are moist.   Eyes:      General:         Right eye: No discharge.         Left eye: No discharge.   Cardiovascular:      Rate and Rhythm: Normal rate and regular rhythm.      Pulses: Normal pulses.      Heart sounds: Normal heart sounds. No murmur heard.     No friction rub.   Pulmonary:      Effort: Pulmonary effort is normal.      Breath sounds: Normal breath sounds. No wheezing or rhonchi.   Abdominal:      General: Bowel sounds are normal. There is no distension.      Palpations: Abdomen is soft. There is no mass.

## 2024-04-02 ENCOUNTER — INITIAL CONSULT (OUTPATIENT)
Dept: ONCOLOGY | Age: 69
End: 2024-04-02
Payer: MEDICARE

## 2024-04-02 ENCOUNTER — ANESTHESIA EVENT (OUTPATIENT)
Dept: OPERATING ROOM | Age: 69
End: 2024-04-02
Payer: MEDICARE

## 2024-04-02 VITALS
SYSTOLIC BLOOD PRESSURE: 141 MMHG | DIASTOLIC BLOOD PRESSURE: 70 MMHG | TEMPERATURE: 96.8 F | WEIGHT: 215.8 LBS | OXYGEN SATURATION: 100 % | BODY MASS INDEX: 42.15 KG/M2 | HEART RATE: 69 BPM

## 2024-04-02 DIAGNOSIS — R91.1 PULMONARY NODULE: Primary | ICD-10-CM

## 2024-04-02 PROBLEM — Z01.818 PREOP EXAMINATION: Status: ACTIVE | Noted: 2024-04-02

## 2024-04-02 PROCEDURE — 1090F PRES/ABSN URINE INCON ASSESS: CPT | Performed by: INTERNAL MEDICINE

## 2024-04-02 PROCEDURE — G8417 CALC BMI ABV UP PARAM F/U: HCPCS | Performed by: INTERNAL MEDICINE

## 2024-04-02 PROCEDURE — 99204 OFFICE O/P NEW MOD 45 MIN: CPT | Performed by: INTERNAL MEDICINE

## 2024-04-02 PROCEDURE — G8427 DOCREV CUR MEDS BY ELIG CLIN: HCPCS | Performed by: INTERNAL MEDICINE

## 2024-04-02 ASSESSMENT — ENCOUNTER SYMPTOMS: ABDOMINAL PAIN: 1

## 2024-04-02 NOTE — PRE-PROCEDURE INSTRUCTIONS
No answer, left message ?    N/A                         Unable to leave message ?    When were you told to arrive at hospital ?  1772    Do you have a  ?YES    Are you on any blood thinners ? NO                    If yes when did you stop taking ?    Do you have your prep Rx filled and instruction ?  N/A    Nothing to eat the day before , only clear liquids.N/A    Are you experiencing any covid symptoms ? NO    Do you have any infections or rash we should be aware of ?NO      Do you have the Hibiclens soap to use the night before and the morning of surgery ?YES    Nothing to eat or drink after midnight, only a sip of water to take any medication instructed to take the night before.INSTRUCTED  Wear comfortable clothing, leave any valuables at home, remove any jewelry and body piercing . INSTRUCTED

## 2024-04-02 NOTE — PROGRESS NOTES
03/12/2024    EOSABS 0.10 03/12/2024    BASOSABS 0.00 03/12/2024         Chemistry        Component Value Date/Time     03/12/2024 1846    K 3.8 03/12/2024 1846     03/12/2024 1846    CO2 25 03/12/2024 1846    BUN 15 03/12/2024 1846    CREATININE 1.0 (H) 03/12/2024 1846        Component Value Date/Time    CALCIUM 8.8 03/12/2024 1846    ALKPHOS 126 02/24/2024 0000    AST 13 02/24/2024 0000    ALT 17 02/24/2024 0000    BILITOT 0.4 02/24/2024 0000            PATHOLOGY DATA:   reviewed  IMAGING DATA:      PET CT SKULL BASE TO MID THIGH  Narrative: EXAMINATION:  WHOLE BODY PET/CT    4/1/2024    TECHNIQUE:  Following IV injection of 11.8 mCi of F-18 FDG, PET  tumor imaging was  acquired from the base of the skull to the mid thighs.  Computed tomography  was used for purposes of attenuation correction and anatomic localization.  Fusion imaging was utilized for interpretation.    Uptake time 56 min.  Glucose level 98 mg/dl.    COMPARISON:  CT scan of the chest, abdomen, and pelvis 03/12/2024    HISTORY:  ORDERING SYSTEM PROVIDED HISTORY: Lung nodule  TECHNOLOGIST PROVIDED HISTORY:  Lung nodule  Reason for Exam: Lung nodule    FINDINGS:  HEAD/NECK: No metabolically active cervical lymphadenopathy.    CHEST: There is diffuse low-level activity at the right lung base more  extensive than the area of nodularity described on the prior CT scan with  increasing opacity compared to the prior exam, SUV max of 2.7.  The more  focal nodularity appears less dense and similar to other atelectatic changes.    No metabolically active axillary, hilar, or mediastinal lymphadenopathy.    ABDOMEN/PELVIS: No significant activity associated with a 2.1 x 1.6 cm right  adrenal nodule, suggesting a benign adenoma.  No metabolically active  intraperitoneal mass.  No metabolically active abdominal or pelvic  lymphadenopathy.  Physiologic activity in the gastrointestinal and  genitourinary systems.    BONES/SOFT TISSUE: Increased activity

## 2024-04-03 ENCOUNTER — ANESTHESIA (OUTPATIENT)
Dept: OPERATING ROOM | Age: 69
End: 2024-04-03
Payer: MEDICARE

## 2024-04-03 ENCOUNTER — HOSPITAL ENCOUNTER (OUTPATIENT)
Age: 69
Setting detail: OUTPATIENT SURGERY
Discharge: HOME | End: 2024-04-03
Attending: SURGERY | Admitting: SURGERY
Payer: MEDICARE

## 2024-04-03 VITALS
RESPIRATION RATE: 16 BRPM | BODY MASS INDEX: 41.82 KG/M2 | HEART RATE: 107 BPM | HEIGHT: 60 IN | TEMPERATURE: 97.1 F | SYSTOLIC BLOOD PRESSURE: 156 MMHG | WEIGHT: 213 LBS | OXYGEN SATURATION: 93 % | DIASTOLIC BLOOD PRESSURE: 93 MMHG

## 2024-04-03 DIAGNOSIS — K80.10 CALCULUS OF GALLBLADDER WITH CHRONIC CHOLECYSTITIS WITHOUT OBSTRUCTION: ICD-10-CM

## 2024-04-03 DIAGNOSIS — K81.1 CHRONIC CHOLECYSTITIS: ICD-10-CM

## 2024-04-03 PROCEDURE — S2900 ROBOTIC SURGICAL SYSTEM: HCPCS | Performed by: SURGERY

## 2024-04-03 PROCEDURE — 6360000002 HC RX W HCPCS: Performed by: ANESTHESIOLOGY

## 2024-04-03 PROCEDURE — 47562 LAPAROSCOPIC CHOLECYSTECTOMY: CPT | Performed by: SURGERY

## 2024-04-03 PROCEDURE — 6370000000 HC RX 637 (ALT 250 FOR IP): Performed by: ANESTHESIOLOGY

## 2024-04-03 PROCEDURE — 7100000010 HC PHASE II RECOVERY - FIRST 15 MIN: Performed by: SURGERY

## 2024-04-03 PROCEDURE — C1889 IMPLANT/INSERT DEVICE, NOC: HCPCS | Performed by: SURGERY

## 2024-04-03 PROCEDURE — 3600000009 HC SURGERY ROBOT BASE: Performed by: SURGERY

## 2024-04-03 PROCEDURE — 2580000003 HC RX 258: Performed by: ANESTHESIOLOGY

## 2024-04-03 PROCEDURE — 94760 N-INVAS EAR/PLS OXIMETRY 1: CPT

## 2024-04-03 PROCEDURE — 6360000002 HC RX W HCPCS: Performed by: SURGERY

## 2024-04-03 PROCEDURE — 88304 TISSUE EXAM BY PATHOLOGIST: CPT

## 2024-04-03 PROCEDURE — 2700000000 HC OXYGEN THERAPY PER DAY

## 2024-04-03 PROCEDURE — 3700000001 HC ADD 15 MINUTES (ANESTHESIA): Performed by: SURGERY

## 2024-04-03 PROCEDURE — 94640 AIRWAY INHALATION TREATMENT: CPT

## 2024-04-03 PROCEDURE — 7100000030 HC ASPR PHASE II RECOVERY - FIRST 15 MIN: Performed by: SURGERY

## 2024-04-03 PROCEDURE — 7100000011 HC PHASE II RECOVERY - ADDTL 15 MIN: Performed by: SURGERY

## 2024-04-03 PROCEDURE — 7100000000 HC PACU RECOVERY - FIRST 15 MIN: Performed by: SURGERY

## 2024-04-03 PROCEDURE — 2709999900 HC NON-CHARGEABLE SUPPLY: Performed by: SURGERY

## 2024-04-03 PROCEDURE — 2500000003 HC RX 250 WO HCPCS: Performed by: NURSE ANESTHETIST, CERTIFIED REGISTERED

## 2024-04-03 PROCEDURE — 3600000019 HC SURGERY ROBOT ADDTL 15MIN: Performed by: SURGERY

## 2024-04-03 PROCEDURE — 7100000031 HC ASPR PHASE II RECOVERY - ADDTL 15 MIN: Performed by: SURGERY

## 2024-04-03 PROCEDURE — 7100000001 HC PACU RECOVERY - ADDTL 15 MIN: Performed by: SURGERY

## 2024-04-03 PROCEDURE — 3700000000 HC ANESTHESIA ATTENDED CARE: Performed by: SURGERY

## 2024-04-03 PROCEDURE — 6360000002 HC RX W HCPCS: Performed by: NURSE ANESTHETIST, CERTIFIED REGISTERED

## 2024-04-03 DEVICE — CLIP INT M L POLYMER LOK LIG HEM O LOK: Type: IMPLANTABLE DEVICE | Status: FUNCTIONAL

## 2024-04-03 RX ORDER — LIDOCAINE HYDROCHLORIDE 10 MG/ML
1 INJECTION, SOLUTION EPIDURAL; INFILTRATION; INTRACAUDAL; PERINEURAL
Status: DISCONTINUED | OUTPATIENT
Start: 2024-04-03 | End: 2024-04-03 | Stop reason: HOSPADM

## 2024-04-03 RX ORDER — ALBUTEROL SULFATE 2.5 MG/3ML
2.5 SOLUTION RESPIRATORY (INHALATION) ONCE
Status: COMPLETED | OUTPATIENT
Start: 2024-04-03 | End: 2024-04-03

## 2024-04-03 RX ORDER — SODIUM CHLORIDE 9 MG/ML
INJECTION, SOLUTION INTRAVENOUS PRN
Status: DISCONTINUED | OUTPATIENT
Start: 2024-04-03 | End: 2024-04-03 | Stop reason: HOSPADM

## 2024-04-03 RX ORDER — METOCLOPRAMIDE HYDROCHLORIDE 5 MG/ML
10 INJECTION INTRAMUSCULAR; INTRAVENOUS
Status: DISCONTINUED | OUTPATIENT
Start: 2024-04-03 | End: 2024-04-03 | Stop reason: HOSPADM

## 2024-04-03 RX ORDER — SODIUM CHLORIDE 0.9 % (FLUSH) 0.9 %
5-40 SYRINGE (ML) INJECTION EVERY 12 HOURS SCHEDULED
Status: DISCONTINUED | OUTPATIENT
Start: 2024-04-03 | End: 2024-04-03 | Stop reason: HOSPADM

## 2024-04-03 RX ORDER — FENTANYL CITRATE 0.05 MG/ML
25 INJECTION, SOLUTION INTRAMUSCULAR; INTRAVENOUS EVERY 5 MIN PRN
Status: DISCONTINUED | OUTPATIENT
Start: 2024-04-03 | End: 2024-04-03 | Stop reason: HOSPADM

## 2024-04-03 RX ORDER — PHENYLEPHRINE HYDROCHLORIDE 10 MG/ML
INJECTION INTRAVENOUS PRN
Status: DISCONTINUED | OUTPATIENT
Start: 2024-04-03 | End: 2024-04-03 | Stop reason: SDUPTHER

## 2024-04-03 RX ORDER — ACETAMINOPHEN 325 MG/1
650 TABLET ORAL
Status: DISCONTINUED | OUTPATIENT
Start: 2024-04-03 | End: 2024-04-03 | Stop reason: HOSPADM

## 2024-04-03 RX ORDER — OXYCODONE HYDROCHLORIDE AND ACETAMINOPHEN 5; 325 MG/1; MG/1
1 TABLET ORAL EVERY 6 HOURS PRN
Qty: 28 TABLET | Refills: 0 | Status: SHIPPED | OUTPATIENT
Start: 2024-04-03 | End: 2024-04-10

## 2024-04-03 RX ORDER — FENTANYL CITRATE 50 UG/ML
INJECTION, SOLUTION INTRAMUSCULAR; INTRAVENOUS PRN
Status: DISCONTINUED | OUTPATIENT
Start: 2024-04-03 | End: 2024-04-03 | Stop reason: SDUPTHER

## 2024-04-03 RX ORDER — LIDOCAINE HYDROCHLORIDE 20 MG/ML
INJECTION, SOLUTION EPIDURAL; INFILTRATION; INTRACAUDAL; PERINEURAL PRN
Status: DISCONTINUED | OUTPATIENT
Start: 2024-04-03 | End: 2024-04-03 | Stop reason: SDUPTHER

## 2024-04-03 RX ORDER — SODIUM CHLORIDE, SODIUM LACTATE, POTASSIUM CHLORIDE, CALCIUM CHLORIDE 600; 310; 30; 20 MG/100ML; MG/100ML; MG/100ML; MG/100ML
INJECTION, SOLUTION INTRAVENOUS CONTINUOUS
Status: DISCONTINUED | OUTPATIENT
Start: 2024-04-03 | End: 2024-04-03 | Stop reason: HOSPADM

## 2024-04-03 RX ORDER — DIPHENHYDRAMINE HYDROCHLORIDE 50 MG/ML
12.5 INJECTION INTRAMUSCULAR; INTRAVENOUS
Status: DISCONTINUED | OUTPATIENT
Start: 2024-04-03 | End: 2024-04-03 | Stop reason: HOSPADM

## 2024-04-03 RX ORDER — ROCURONIUM BROMIDE 10 MG/ML
INJECTION, SOLUTION INTRAVENOUS PRN
Status: DISCONTINUED | OUTPATIENT
Start: 2024-04-03 | End: 2024-04-03 | Stop reason: SDUPTHER

## 2024-04-03 RX ORDER — ACETAMINOPHEN 500 MG
1000 TABLET ORAL ONCE
Status: COMPLETED | OUTPATIENT
Start: 2024-04-03 | End: 2024-04-03

## 2024-04-03 RX ORDER — MEPERIDINE HYDROCHLORIDE 25 MG/ML
12.5 INJECTION INTRAMUSCULAR; INTRAVENOUS; SUBCUTANEOUS EVERY 5 MIN PRN
Status: DISCONTINUED | OUTPATIENT
Start: 2024-04-03 | End: 2024-04-03 | Stop reason: HOSPADM

## 2024-04-03 RX ORDER — GABAPENTIN 100 MG/1
100 CAPSULE ORAL ONCE
Status: COMPLETED | OUTPATIENT
Start: 2024-04-03 | End: 2024-04-03

## 2024-04-03 RX ORDER — ONDANSETRON 4 MG/1
TABLET, FILM COATED ORAL
Qty: 20 TABLET | Refills: 0 | Status: SHIPPED | OUTPATIENT
Start: 2024-04-03

## 2024-04-03 RX ORDER — SODIUM CHLORIDE 0.9 % (FLUSH) 0.9 %
5-40 SYRINGE (ML) INJECTION PRN
Status: DISCONTINUED | OUTPATIENT
Start: 2024-04-03 | End: 2024-04-03 | Stop reason: HOSPADM

## 2024-04-03 RX ORDER — NALOXONE HYDROCHLORIDE 0.4 MG/ML
INJECTION, SOLUTION INTRAMUSCULAR; INTRAVENOUS; SUBCUTANEOUS PRN
Status: DISCONTINUED | OUTPATIENT
Start: 2024-04-03 | End: 2024-04-03 | Stop reason: HOSPADM

## 2024-04-03 RX ORDER — HYDRALAZINE HYDROCHLORIDE 20 MG/ML
10 INJECTION INTRAMUSCULAR; INTRAVENOUS
Status: DISCONTINUED | OUTPATIENT
Start: 2024-04-03 | End: 2024-04-03 | Stop reason: HOSPADM

## 2024-04-03 RX ORDER — BUPIVACAINE HYDROCHLORIDE 5 MG/ML
INJECTION, SOLUTION EPIDURAL; INTRACAUDAL PRN
Status: DISCONTINUED | OUTPATIENT
Start: 2024-04-03 | End: 2024-04-03 | Stop reason: ALTCHOICE

## 2024-04-03 RX ORDER — DEXAMETHASONE SODIUM PHOSPHATE 4 MG/ML
INJECTION, SOLUTION INTRA-ARTICULAR; INTRALESIONAL; INTRAMUSCULAR; INTRAVENOUS; SOFT TISSUE PRN
Status: DISCONTINUED | OUTPATIENT
Start: 2024-04-03 | End: 2024-04-03 | Stop reason: SDUPTHER

## 2024-04-03 RX ORDER — ONDANSETRON 2 MG/ML
INJECTION INTRAMUSCULAR; INTRAVENOUS PRN
Status: DISCONTINUED | OUTPATIENT
Start: 2024-04-03 | End: 2024-04-03 | Stop reason: SDUPTHER

## 2024-04-03 RX ORDER — ONDANSETRON 2 MG/ML
4 INJECTION INTRAMUSCULAR; INTRAVENOUS
Status: COMPLETED | OUTPATIENT
Start: 2024-04-03 | End: 2024-04-03

## 2024-04-03 RX ORDER — OXYCODONE HYDROCHLORIDE AND ACETAMINOPHEN 5; 325 MG/1; MG/1
1 TABLET ORAL ONCE
Status: COMPLETED | OUTPATIENT
Start: 2024-04-03 | End: 2024-04-03

## 2024-04-03 RX ORDER — CEPHALEXIN 500 MG/1
CAPSULE ORAL
Qty: 21 CAPSULE | Refills: 0 | Status: SHIPPED | OUTPATIENT
Start: 2024-04-03

## 2024-04-03 RX ORDER — LABETALOL HYDROCHLORIDE 5 MG/ML
10 INJECTION, SOLUTION INTRAVENOUS
Status: DISCONTINUED | OUTPATIENT
Start: 2024-04-03 | End: 2024-04-03 | Stop reason: HOSPADM

## 2024-04-03 RX ORDER — MIDAZOLAM HYDROCHLORIDE 1 MG/ML
INJECTION INTRAMUSCULAR; INTRAVENOUS PRN
Status: DISCONTINUED | OUTPATIENT
Start: 2024-04-03 | End: 2024-04-03 | Stop reason: SDUPTHER

## 2024-04-03 RX ORDER — PROPOFOL 10 MG/ML
INJECTION, EMULSION INTRAVENOUS PRN
Status: DISCONTINUED | OUTPATIENT
Start: 2024-04-03 | End: 2024-04-03 | Stop reason: SDUPTHER

## 2024-04-03 RX ADMIN — GABAPENTIN 100 MG: 100 CAPSULE ORAL at 12:04

## 2024-04-03 RX ADMIN — ONDANSETRON 4 MG: 2 INJECTION INTRAMUSCULAR; INTRAVENOUS at 14:59

## 2024-04-03 RX ADMIN — ROCURONIUM BROMIDE 30 MG: 10 INJECTION, SOLUTION INTRAVENOUS at 14:14

## 2024-04-03 RX ADMIN — Medication 2000 MG: at 14:06

## 2024-04-03 RX ADMIN — FENTANYL CITRATE 50 MCG: 50 INJECTION, SOLUTION INTRAMUSCULAR; INTRAVENOUS at 14:39

## 2024-04-03 RX ADMIN — ACETAMINOPHEN 1000 MG: 500 TABLET ORAL at 12:04

## 2024-04-03 RX ADMIN — SUGAMMADEX 200 MG: 100 INJECTION, SOLUTION INTRAVENOUS at 15:19

## 2024-04-03 RX ADMIN — PROPOFOL 150 MG: 10 INJECTION, EMULSION INTRAVENOUS at 13:57

## 2024-04-03 RX ADMIN — ALBUTEROL SULFATE 2.5 MG: 2.5 SOLUTION RESPIRATORY (INHALATION) at 15:54

## 2024-04-03 RX ADMIN — DEXAMETHASONE SODIUM PHOSPHATE 8 MG: 4 INJECTION, SOLUTION INTRAMUSCULAR; INTRAVENOUS at 14:14

## 2024-04-03 RX ADMIN — OXYCODONE AND ACETAMINOPHEN 1 TABLET: 5; 325 TABLET ORAL at 16:51

## 2024-04-03 RX ADMIN — ROCURONIUM BROMIDE 50 MG: 10 INJECTION, SOLUTION INTRAVENOUS at 13:58

## 2024-04-03 RX ADMIN — SODIUM CHLORIDE, POTASSIUM CHLORIDE, SODIUM LACTATE AND CALCIUM CHLORIDE: 600; 310; 30; 20 INJECTION, SOLUTION INTRAVENOUS at 12:04

## 2024-04-03 RX ADMIN — MIDAZOLAM 2 MG: 1 INJECTION INTRAMUSCULAR; INTRAVENOUS at 13:56

## 2024-04-03 RX ADMIN — LIDOCAINE HYDROCHLORIDE 100 MG: 20 INJECTION, SOLUTION EPIDURAL; INFILTRATION; INTRACAUDAL; PERINEURAL at 13:56

## 2024-04-03 RX ADMIN — FENTANYL CITRATE 50 MCG: 50 INJECTION, SOLUTION INTRAMUSCULAR; INTRAVENOUS at 13:56

## 2024-04-03 RX ADMIN — PHENYLEPHRINE HYDROCHLORIDE 100 MCG: 10 INJECTION INTRAVENOUS at 14:14

## 2024-04-03 RX ADMIN — FENTANYL CITRATE 50 MCG: 50 INJECTION, SOLUTION INTRAMUSCULAR; INTRAVENOUS at 15:24

## 2024-04-03 RX ADMIN — FENTANYL CITRATE 50 MCG: 50 INJECTION, SOLUTION INTRAMUSCULAR; INTRAVENOUS at 14:25

## 2024-04-03 RX ADMIN — ONDANSETRON 4 MG: 2 INJECTION INTRAMUSCULAR; INTRAVENOUS at 15:53

## 2024-04-03 ASSESSMENT — PAIN - FUNCTIONAL ASSESSMENT
PAIN_FUNCTIONAL_ASSESSMENT: 0-10
PAIN_FUNCTIONAL_ASSESSMENT: NONE - DENIES PAIN
PAIN_FUNCTIONAL_ASSESSMENT: 0-10
PAIN_FUNCTIONAL_ASSESSMENT: 0-10

## 2024-04-03 ASSESSMENT — PAIN DESCRIPTION - DESCRIPTORS
DESCRIPTORS: SORE
DESCRIPTORS: ACHING

## 2024-04-03 NOTE — ANESTHESIA POSTPROCEDURE EVALUATION
Department of Anesthesiology  Postprocedure Note    Patient: Hyun Najera  MRN: [id number]  YOB: 1955  Date of evaluation: 4/3/2024    Procedure Summary       Date: 04/03/24 Room / Location: [address]    Anesthesia Start: 1351 Anesthesia Stop: 1545    Procedure: CHOLECYSTECTOMY LAPAROSCOPIC ROBOTIC XI (Abdomen) Diagnosis:       Chronic cholecystitis      Calculus of gallbladder with chronic cholecystitis without obstruction      (Chronic cholecystitis [K81.1])      (Calculus of gallbladder with chronic cholecystitis without obstruction [K80.10])    Surgeons: Oswaldo Grewal MD Responsible Provider: Robby Mehta MD    Anesthesia Type: general ASA Status: 2            Anesthesia Type: No value filed.    Don Phase I: Don Score: 8    Don Phase II: Don Score: 10    Anesthesia Post Evaluation    Comments: POST- ANESTHESIA EVALUATION       Pt Name: Hyun Najera  MRN: [id number]  YOB: 1955  Date of evaluation: 4/3/2024  Time:  6:34 PM      BP (!) 156/93   Pulse (!) 107   Temp 97.1 °F (36.2 °C) (Infrared)   Resp 16   Ht 1.524 m (5')   Wt 96.6 kg (213 lb)   SpO2 93%   BMI 41.60 kg/m²      Consciousness Level  Awake  Cardiopulmonary Status  Stable  Pain Adequately Treated YES  Nausea / Vomiting  NO  Adequate Hydration  YES  Anesthesia Related Complications NONE      Electronically signed by Robby Mehta MD on 4/3/2024 at 6:34 PM           No notable events documented.

## 2024-04-03 NOTE — PROGRESS NOTES
CLINICAL PHARMACY NOTE: MEDS TO BEDS    Total # of Prescriptions Filled: 3   The following medications were delivered to the patient:  Cephalexin 500mg  Ondansetron 4mg  Oxycodone/APAP 5/325mg       Additional Documentation: delivered to pt and spouse in Short Stay w/Nurse Jeannette present 4/3/24 5:00pm carol

## 2024-04-03 NOTE — DISCHARGE INSTRUCTIONS
Dr. Grewal Post-Op Orders for Outpatient    1.) Diet:    [x]  As tolerated  []  Special     2.) Activity:    [x]  No lifting more than five to ten pounds 2-3 weeks  [x]  May Shower tomorrow  [x]  No driving until off pain medications     3.) Dressing:    [x]  Remove top dressing in 48 hours   [x]  Leave steri strips on     [x]  Remove and change dressing sooner if soaked    4.) Medication:    [x]  Take medication as prescribed   []  Resume blood thinners in  days    [x]  Resume home medications per AVS Summary    5.) Office visit: Follow up with Dr. Grewal. Call to schedule an appointment if one has not been made.     6.) Call [phone] if you have any questions or concerns.    Electronically signed by Oswaldo Grewal MD on 4/3/2024 at 4:27 PM    DISCHARGE INSTRUCTIONS FOR ABDOMINAL SURGERY    In order to continue your care at home, please follow the instructions below.    For General Anesthesia  Do not drink any alcoholic beverages or make any legal or important decisions for 24 hours.      Diet    Drink plenty of fluids after surgery, unless you are on a fluid restriction.  After general anesthesia, start out eating lightly (broth, soup, crackers, toast, etc.) advancing as tolerated to your usual diet.  Try to avoid spicy or greasy/fatty foods for 24 hours.  Avoid milk/milk product for several hours.  You may notice that your bowel movements are not regular right after your surgery. This is common. Avoid constipation and straining with bowel movements. You may want to take a fiber supplement every day. If you have not had a bowel movement after a couple of days, ask your doctor about taking a mild laxative.       Medications  Take medications as instructed by your surgeon.   Please do not take prescribed pain medication with alcoholic beverages.  When cleared to drive by your surgeon, please do not drive or operate machinery while taking any prescribed pain medication.    Activities  As instructed by your  surgeon.    Try to walk each day. Start by walking a little more than you did the day before. Bit by bit, increase the amount you walk. Walking boosts blood flow and helps prevent pneumonia and constipation.  When getting out of bed, bend your knees up, roll to your side, and push on the bed with your arms to push yourself up sideways.    Avoid strenuous activities, such as biking, jogging, weight lifting, or aerobic exercise, until your doctor says it is okay.  Avoid heavy work or sports until surgeon approves.    Avoid lifting anything that would make you strain until surgeon approves. This may include heavy grocery bags and milk containers, a heavy briefcase or backpack, cat litter or dog food bags, a vacuum , or a child.  No driving or operating machinery until cleared by surgeon.  May shower tomorrow if incision was closed with surgical glue, otherwise may shower after dressings are removed.  No soaking tub baths until surgeon approves.    Surgery Area  Always keep dressings/incisions clean and dry  If covered with a dressing, you may remove it as instructed by surgeon.    Apply ice pack 20-30 min 4 times a day for 48 hours to help decrease swelling and pain.  Make sure to have a piece of cloth between the ice bag and your skin.  Hold a pillow over your incision when you cough or take deep breaths. This will support your belly and decrease your pain.  Do breathing exercises at home as instructed by your doctor. This will help prevent pneumonia.  If you had laparoscopic surgery, you may also have pain in your left shoulder. The pain usually lasts about a day or two.    Call your surgeon for the following:  You have pain that does not get better after you take pain medicine.   For an oral temperature (by mouth) is 101 degrees or higher, chills, or excessive sweating.  You have increasing and progressive bleeding or drainage from surgery site.  You have loose stiches, or your incision comes open.  Signs of

## 2024-04-03 NOTE — OP NOTE
[facility] General Surgery   Oswaldo Grewal MD, FACS  Nara Cunningham, APRN-CNP  [address], [address]  [place] [address]  P: [phone], F: [phone]    Preoperative diagnosis: Chronic cholecystitis    Postoperative diagnosis: Same    Procedure: Robotic cholecystectomy    Surgeon: Dr. Carmina Gaines.: None    Anesthesia: General    Preparation: Chloraprep    EBL: Less than 25 mL    Specimen: Gallbladder    Procedure: Informed consent was obtained.  Preoperative antibiotic was given.  Patient was taken to the operating room.  General anesthesia was given.  Abdomen was prepped and draped in usual sterile fashion.  Timeout was done.  Supraumbilical incision was made.  Massey port was introduced using the open technique without any difficulty.  CO2 insufflation was carried out.  Scope was introduced.  Patient was placed in a reverse Trendelenburg position.  3 additional ports were placed in usual fashion.  Robot was brought in.  All the ports were docked in usual fashion.  Camera and the ancillary instruments were advanced towards the target anatomy.  Assistant grabbed the fundus of the gallbladder and that was retracted anterosuperiorly.  Careful dissection was continued in the triangle of calot.  Cystic duct was isolated was skeletonized.  Cystic artery was isolated was skeletonized.  Critical view was obtained.  Anatomy was confirmed.  After confirming the anatomy cystic duct was clipped and divided.  Cystic artery was clipped and divided.  Gallbladder was peeled off the liver bed using the hook cautery.  Complete hemostasis was confirmed.  All the clips were found to be intact.  At this point instruments were removed and the robot was undocked.  Gallbladder was retrieved and sent to pathology for further evaluation.  All the port sites are visualized.  No bleeding noted.  All the ports were removed.  Fascia and the skin was approximated in the usual fashion.  Local anesthetic was

## 2024-04-03 NOTE — ANESTHESIA PRE PROCEDURE
\"PHART\", \"PO2ART\", \"WFF5GFT\", \"GNM8XSJ\", \"BEART\", \"P4DAIJPA\"     Type & Screen (If Applicable):  No results found for: \"LABABO\", \"LABRH\"    Drug/Infectious Status (If Applicable):  No results found for: \"HIV\", \"HEPCAB\"    COVID-19 Screening (If Applicable):   Lab Results   Component Value Date/Time    COVID19 Not Detected 10/12/2022 07:35 PM           Anesthesia Evaluation  Patient summary reviewed and Nursing notes reviewed  Airway: Mallampati: II  TM distance: >3 FB   Neck ROM: full  Mouth opening: > = 3 FB   Dental:          Pulmonary:normal exam  breath sounds clear to auscultation  (+)           asthma: allergic asthma,                            Cardiovascular:Negative CV ROS  Exercise tolerance: good (>4 METS)          Rhythm: regular  Rate: normal           Beta Blocker:  Not on Beta Blocker         Neuro/Psych:   Negative Neuro/Psych ROS              GI/Hepatic/Renal: Neg GI/Hepatic/Renal ROS            Endo/Other: Negative Endo/Other ROS                    Abdominal:             Vascular: negative vascular ROS.         Other Findings:       Anesthesia Plan      general     ASA 2       Induction: intravenous.    MIPS: Postoperative opioids intended and Prophylactic antiemetics administered.  Anesthetic plan and risks discussed with patient.      Plan discussed with CRNA.                Robby Mehta MD   4/3/2024

## 2024-04-03 NOTE — INTERVAL H&P NOTE
Update History & Physical    The patient's History and Physical of April 1, 2024 was reviewed with the patient and I examined the patient. There was no change. The surgical site was confirmed by the patient and me.     Pt undergoing  Cholecystectomy Laparoscopic Robotic Xl for Chronic cholecystitis and Calculus of gallbladder with chronic cholecystitis without obstruction  Pt continues to complain of RUQ after recent fall 3/12/2024 and prior to diagnosis of cholecystitis.  Pt denies fever/chills, chest pain or SOB  Pt NPO since MN, No am medication today  Pt denies hx MRSA  Pt denies hx of blood clots Lungs/legs  Anticoagulants:  Ibuprofen stopped 8-9 days ago  Physical exam remains unchanged including cardiac and pulmonary assessment  See nursing flow sheet for vital signs     Electronically signed by VALERIA Briggs CNP on 4/3/2024 at 11:15 AM

## 2024-04-04 ENCOUNTER — TELEPHONE (OUTPATIENT)
Dept: ONCOLOGY | Age: 69
End: 2024-04-04

## 2024-04-04 NOTE — TELEPHONE ENCOUNTER
CT chest 3 months   RTc after scan            Gave pt CT order and scheduling number to schedule for one week prior to rv    Rv scheduled for 7/18/24 @ 10am    An After Visit Summary was printed and given to the patient.    Electronically signed by Shonna Su on 4/4/2024 at 8:02 AM

## 2024-04-05 LAB — SURGICAL PATHOLOGY REPORT: NORMAL

## 2024-04-18 ENCOUNTER — OFFICE VISIT (OUTPATIENT)
Age: 69
End: 2024-04-18

## 2024-04-18 VITALS
TEMPERATURE: 97.2 F | OXYGEN SATURATION: 95 % | SYSTOLIC BLOOD PRESSURE: 150 MMHG | WEIGHT: 215 LBS | DIASTOLIC BLOOD PRESSURE: 84 MMHG | HEART RATE: 70 BPM | HEIGHT: 60 IN | BODY MASS INDEX: 42.21 KG/M2

## 2024-04-18 DIAGNOSIS — K80.10 CALCULUS OF GALLBLADDER WITH CHRONIC CHOLECYSTITIS WITHOUT OBSTRUCTION: Primary | ICD-10-CM

## 2024-04-18 DIAGNOSIS — Z98.890 STATUS POST SURGERY: ICD-10-CM

## 2024-04-18 PROCEDURE — 99024 POSTOP FOLLOW-UP VISIT: CPT | Performed by: NURSE PRACTITIONER

## 2024-04-18 NOTE — PROGRESS NOTES
[facility] General Surgery   Oswaldo Grewal MD, FACS  Nara MJinny Octavio, APRN-CNP  [address], [address]  [place] [address]  P: [phone], F: [phone]    General and Robotic Surgery  Post-Op Visit Note               PATIENT NAME: Hyun Najera   :  1955   MRN: [id number]   PCP:  Irwin Sánchez MD     TODAY'S DATE: 2024    Chief Complaint   Patient presents with    Post-Op Check     Cholecystectomy         HISTORY OF PRESENT ILLNESS: 68 y.o. female status post robotic cholecystectomy for chronic cholecystitis on April 3, 2024.    Patient feeling well overall, denies any significant ABD/pelvic pain. Denies nausea/vomiting. States bowels moving normally. Denies bloody/black, tarry stools. Denies fever/chills. Good appetite. Urinating without issue. Denies any obvious incisional issues, or signs/symptoms of infections. Completed post-op ATB's.    PAST MEDICAL HISTORY     Past Medical History:   Diagnosis Date    Adrenal nodule (HCC) 2024    3.3 cm right CT scan 3/12/24    Asthma 10/07/2013    Cholelithiasis     Chronic rhinosinusitis 10/07/2013    Family history of diabetes mellitus (DM) 10/07/2013    Former smoker 10/07/2013    Full dentures     upper and lower    Inguinal hernia 2024    CT scan 3/12/24 fat containing    Pulmonary nodule 2024    1.4 cm on CT scan 3/12/24    Umbilical hernia     CT scan 3/12/24- fat containing       PROBLEM LIST     Patient Active Problem List   Diagnosis    Allergic rhinitis    Asthma    Family history of diabetes mellitus (DM)    Chronic rhinosinusitis    Former smoker    Enthesopathy of ankle and tarsus    Exostosis    Plantar fasciitis of right foot    Sprain of left lower leg    Pain of left lower extremity    Morbid obesity with BMI of 40.0-44.9, adult (HCC)    IFG (impaired fasting glucose)    Prediabetes    Mixed hyperlipidemia    Personal history of COVID-19    Preop examination       SURGICAL HISTORY

## 2024-05-02 PROBLEM — Z01.818 PREOP EXAMINATION: Status: RESOLVED | Noted: 2024-04-02 | Resolved: 2024-05-02

## 2024-05-14 ENCOUNTER — OFFICE VISIT (OUTPATIENT)
Dept: OBGYN CLINIC | Age: 69
End: 2024-05-14
Payer: MEDICARE

## 2024-05-14 ENCOUNTER — TELEPHONE (OUTPATIENT)
Dept: OBGYN CLINIC | Age: 69
End: 2024-05-14

## 2024-05-14 VITALS
HEIGHT: 60 IN | SYSTOLIC BLOOD PRESSURE: 130 MMHG | BODY MASS INDEX: 42.01 KG/M2 | WEIGHT: 214 LBS | DIASTOLIC BLOOD PRESSURE: 80 MMHG

## 2024-05-14 DIAGNOSIS — R93.89 ABNORMAL FINDING ON IMAGING: Primary | ICD-10-CM

## 2024-05-14 DIAGNOSIS — Z78.0 ENCOUNTER FOR OSTEOPOROSIS SCREENING IN ASYMPTOMATIC POSTMENOPAUSAL PATIENT: ICD-10-CM

## 2024-05-14 DIAGNOSIS — Z12.31 ENCOUNTER FOR SCREENING MAMMOGRAM FOR BREAST CANCER: ICD-10-CM

## 2024-05-14 DIAGNOSIS — Z87.891 FORMER SMOKER: ICD-10-CM

## 2024-05-14 DIAGNOSIS — Z13.820 ENCOUNTER FOR OSTEOPOROSIS SCREENING IN ASYMPTOMATIC POSTMENOPAUSAL PATIENT: ICD-10-CM

## 2024-05-14 PROCEDURE — G8399 PT W/DXA RESULTS DOCUMENT: HCPCS | Performed by: OBSTETRICS & GYNECOLOGY

## 2024-05-14 PROCEDURE — 99203 OFFICE O/P NEW LOW 30 MIN: CPT | Performed by: OBSTETRICS & GYNECOLOGY

## 2024-05-14 PROCEDURE — G8417 CALC BMI ABV UP PARAM F/U: HCPCS | Performed by: OBSTETRICS & GYNECOLOGY

## 2024-05-14 PROCEDURE — G8427 DOCREV CUR MEDS BY ELIG CLIN: HCPCS | Performed by: OBSTETRICS & GYNECOLOGY

## 2024-05-14 PROCEDURE — 3017F COLORECTAL CA SCREEN DOC REV: CPT | Performed by: OBSTETRICS & GYNECOLOGY

## 2024-05-14 PROCEDURE — 1036F TOBACCO NON-USER: CPT | Performed by: OBSTETRICS & GYNECOLOGY

## 2024-05-14 PROCEDURE — 1090F PRES/ABSN URINE INCON ASSESS: CPT | Performed by: OBSTETRICS & GYNECOLOGY

## 2024-05-14 PROCEDURE — 1123F ACP DISCUSS/DSCN MKR DOCD: CPT | Performed by: OBSTETRICS & GYNECOLOGY

## 2024-05-14 NOTE — TELEPHONE ENCOUNTER
Per conversation w   regarding this pt to ask her about her paps , and let her know about her dexa scan and mammogram orders and to call 's office regarding a colonoscopy referral. I did speak w Pt and she stated no paps regularly up to age 65 , pt was given the scheduling number to schedule her mamm and dexa scan and pt is aware to call  .

## 2024-05-14 NOTE — PROGRESS NOTES
Hyun Najera  2024      Hyun Najera is a 68 y.o. female       The patient was seen today. She was here to follow-up regarding her labs and diagnostics ordered at her last visit for the diagnosis of:    ICD-10-CM    1. Abnormal finding on imaging-CT Thickened endometrium  R93.89 US PELVIS COMPLETE NON-OB TRANSABDOMINAL AND TRANSVAGINAL      2. Former smoker  Z87.891       3. Encounter for screening mammogram for breast cancer  Z12.31 INDIO DIGITAL SCREEN W OR WO CAD BILATERAL     DEXA AXIAL SKELETON W VERTEBRAL FX ASST      4. Encounter for osteoporosis screening in asymptomatic postmenopausal patient  Z13.820 INDIO DIGITAL SCREEN W OR WO CAD BILATERAL    Z78.0 DEXA AXIAL SKELETON W VERTEBRAL FX ASST            Her bowels are regular and she is voiding without difficulty. The pt has no CC today. She denies any PMB or discharge. She had a CT abdomen and pelvis for a non gynecologic issue and the CT report denoted an endometrial  thickness UP TO 1 CM.  Tissue sampling was recommended. I called radiology to speak with radioogist as the new guidelines note a Thickness up to 11 mm normal if no bleeding is present. I discussed D&C and EMB options with the pt and the RBA of each. She declined any intervention today. She would undergo an ultrasound and pending findings proceed off that report. I reviewed the possibility of hyperplasia, atypia and malignancy. She wishes to proceed with ultrasound then pending findings expectant fu vs surgical bx. I am awaiting a call back from radiology to review ACOG/ACOR recs.       Past Medical History:   Diagnosis Date    Adrenal nodule (HCC) 2024    3.3 cm right CT scan 3/12/24    Asthma 10/07/2013    Cholelithiasis     Chronic rhinosinusitis 10/07/2013    Family history of diabetes mellitus (DM) 10/07/2013    Former smoker 10/07/2013    Full dentures     upper and lower    Inguinal hernia 2024    CT scan 3/12/24 fat containing    Pulmonary nodule 2024

## 2024-05-22 ENCOUNTER — HOSPITAL ENCOUNTER (OUTPATIENT)
Dept: WOMENS IMAGING | Age: 69
Discharge: HOME | End: 2024-05-24
Attending: OBSTETRICS & GYNECOLOGY
Payer: MEDICARE

## 2024-05-22 ENCOUNTER — OFFICE VISIT (OUTPATIENT)
Age: 69
End: 2024-05-22

## 2024-05-22 VITALS
RESPIRATION RATE: 16 BRPM | WEIGHT: 215 LBS | OXYGEN SATURATION: 94 % | DIASTOLIC BLOOD PRESSURE: 63 MMHG | SYSTOLIC BLOOD PRESSURE: 123 MMHG | HEART RATE: 73 BPM | HEIGHT: 60 IN | BODY MASS INDEX: 42.21 KG/M2

## 2024-05-22 DIAGNOSIS — Z98.890 STATUS POST SURGERY: Primary | ICD-10-CM

## 2024-05-22 DIAGNOSIS — Z78.0 ENCOUNTER FOR OSTEOPOROSIS SCREENING IN ASYMPTOMATIC POSTMENOPAUSAL PATIENT: ICD-10-CM

## 2024-05-22 DIAGNOSIS — Z13.820 ENCOUNTER FOR OSTEOPOROSIS SCREENING IN ASYMPTOMATIC POSTMENOPAUSAL PATIENT: ICD-10-CM

## 2024-05-22 DIAGNOSIS — Z12.31 ENCOUNTER FOR SCREENING MAMMOGRAM FOR BREAST CANCER: ICD-10-CM

## 2024-05-22 PROCEDURE — 77063 BREAST TOMOSYNTHESIS BI: CPT

## 2024-05-22 PROCEDURE — 77080 DXA BONE DENSITY AXIAL: CPT

## 2024-05-22 NOTE — PROGRESS NOTES
radiation dose to as low as reasonably achievable.    COMPARISON:  None    HISTORY:  ORDERING SYSTEM PROVIDED HISTORY: ro fx  TECHNOLOGIST PROVIDED HISTORY:  ro fx  Reason for Exam: fall    FINDINGS:  Chest:    Pulmonary Arteries: Main pulmonary artery is normal in caliber.    Mediastinum: No evidence of mediastinal lymphadenopathy.  The heart and  pericardium demonstrate no acute abnormality.  There is no acute abnormality  of the thoracic aorta.    Lungs/pleura: Mild bilateral basilar atelectasis.  Ground-glass nodule along  the right oblique fissure measures 5 mm in size (6/56) right lower lobe solid  pulmonary nodule measures 1.4 cm.  No masses or consolidation.    Soft Tissues/Bones: No acute bone or soft tissue abnormality.    Abdomen    Organs:    Liver: Liver is normal. No focal lesions are seen.    Spleen: Normal.    Pancreas: Normal    Gallbladder: Normal and wall thickness.  Tiny calculi are seen in the  gallbladder neck..    Adrenal glands: Indeterminate right adrenal nodule measuring 3.3 cm and  attenuation of 57 Hounsfield units..    CBD: Normal in caliber.    Kidneys and ureters: Multiple simple cortical and peripelvic cysts are seen.  There is no hydronephrosis or calculi. Ureters are non-dilated.    Abdominal aorta and branches: Normal in caliber.    IVC and portal vein: Normal in caliber.    Urinary bladder: Normal wall thickness.    GI/Bowel: Bowel loops are normal in wall-thickness and caliber. Scattered  left-sided colonic diverticulosis is seen. No evidence of appendicitis.    Peritoneum/Retroperitoneum: Normal    Endometrium is thickened measuring 1 cm in maximum thickness.    PELVIS  Normal reproductive organs    Bones/Soft Tissues: Normal There is a fat containing umbilical and inguinal  hernia.    BONES/ALIGNMENT: There is normal alignment of the spine. The vertebral body  heights are maintained. No osseous destructive lesion is seen.  Sclerotic  focus at T5 vertebral body measuring 0.6 cm

## 2024-05-23 ENCOUNTER — TELEPHONE (OUTPATIENT)
Dept: OBGYN CLINIC | Age: 69
End: 2024-05-23

## 2024-05-23 NOTE — TELEPHONE ENCOUNTER
Per Dr Concepcion pt notified of Mammogram, Dexa and pelvic US results.  Pt encouraged to keep scheduled follow up with Dr Concepcion to discuss results and recommendations.

## 2024-05-23 NOTE — TELEPHONE ENCOUNTER
----- Message from Edwardo Concepcion DO sent at 5/22/2024  9:38 PM EDT -----  !!!!!!!!!!!!!!!!!!!!!!!!!!!! ABNORMAL DEXA !!!!!!!!!!!!!!!!!!!    1500 mg of Calcium Daily. Split dosing of 500 mg three times daily.800 iu Vitamin D Daily.    Weight bearing exercises if patient can tolerate.      Discuss options for Fosamax, Evista, or Reclast.If patient wishes one of these medications  have her return for an appointment.    Repeat the Bone Density in 1 year.    IMPRESSION:  Osteopenia by WHO criteria.     RECOMMENDATIONS:  1. All patients should optimize their calcium and vitamin D intake.     2. Consider FDA-approved medical therapies in postmenopausal women and men  aged 50 years and older, based on the following:     - A hip or vertebral (clinical or morphometric) fracture     - T-score less than or equal to -2.5 at the femoral neck or spine after  appropriate evaluation to exclude secondary causes     - Low bone density (T-score between -1.0 and -2.5 at the femoral neck or  spine) and a 10-year probability of a hip fracture greater than or equal to  3% or a 10-year probability of a major osteoporosis-related fracture greater  than or equal to 20% based on FRAX calculation.     - Clinician judgment and/or patient preferences may indicate treatment for  people with 10-year fracture probabilities above or below these levels     - Further guidance on treatment can be found at the National Osteoporosis  Foundation's website bonesource.org.     3. Patients with diagnosis of osteoporosis or at high risk for fracture  should have regular bone mineral density tests. For patients eligible for  Medicare, routine testing is allowed once every 2 years. The testing  frequency can be increased to one year for patients who have rapidly  progressing disease, those who are receiving or discontinuing medical therapy  to restore bone mass or have additional risk factors.

## 2024-05-25 ASSESSMENT — ENCOUNTER SYMPTOMS
SORE THROAT: 0
SHORTNESS OF BREATH: 0
COUGH: 0
RHINORRHEA: 0

## 2024-05-30 ENCOUNTER — TELEPHONE (OUTPATIENT)
Age: 69
End: 2024-05-30

## 2024-05-30 DIAGNOSIS — Z01.818 PREOP EXAMINATION: Primary | ICD-10-CM

## 2024-05-30 NOTE — TELEPHONE ENCOUNTER
Spoke with patient to schedule procedure  Pre op instructions discussed and will be mailed today    EM/SC/7- at 11:00am/Open Umbilical Hernia Repair with Mesh/Jaun      [facility] General Surgery   Oswaldo Grewal MD, FACS  Nara Cunningham, APRN-CNP  [address], [address]  [place] [address]  P: [phone], F: [phone]          Hyun Najera  BD: 1955          STOP ASPIRIN 7 DAYS PRIOR TO PROCEDURE    STOP ALL OTHER BLOOD THINNERS 5 DAYS PRIOR TO PROCEDURE    NOTHING TO EAT, DRINK, SMOKE, OR CHEW AFTER MIDNIGHT  You may brush your teeth, rinse gargle, and spit  Heart or blood pressure medication ONLY with a  small sip of water, unless otherwise directed  Shower with regular soap and water    YOU MUST HAVE AN ADULT EITHER DRIVE YOU OR RIDE IN A CAB WITH YOU        MY EXAM IS SCHEDULED FOR;      Pre-Admission Testing at [facility]:  7/3/2024 at 8:30am      SURGERY DATE:  7/17/2024 TIME:  11:00am ARRIVAL TIME:  9:00am    LOCATION:  [facility]    Post Op appointment at [facility] with Dr Hernandez:  7/31/2024 at 8:30am

## 2024-05-31 ENCOUNTER — HOSPITAL ENCOUNTER (OUTPATIENT)
Age: 69
Setting detail: SPECIMEN
Discharge: HOME | End: 2024-05-31

## 2024-05-31 ENCOUNTER — OFFICE VISIT (OUTPATIENT)
Dept: OBGYN CLINIC | Age: 69
End: 2024-05-31
Payer: MEDICARE

## 2024-05-31 VITALS
HEIGHT: 60 IN | SYSTOLIC BLOOD PRESSURE: 118 MMHG | WEIGHT: 218 LBS | BODY MASS INDEX: 42.8 KG/M2 | DIASTOLIC BLOOD PRESSURE: 66 MMHG

## 2024-05-31 DIAGNOSIS — R93.89 ENDOMETRIAL THICKENING ON ULTRASOUND: ICD-10-CM

## 2024-05-31 DIAGNOSIS — R93.89 ABNORMAL FINDING ON IMAGING: Primary | ICD-10-CM

## 2024-05-31 PROCEDURE — 99213 OFFICE O/P EST LOW 20 MIN: CPT | Performed by: OBSTETRICS & GYNECOLOGY

## 2024-05-31 PROCEDURE — G8427 DOCREV CUR MEDS BY ELIG CLIN: HCPCS | Performed by: OBSTETRICS & GYNECOLOGY

## 2024-05-31 PROCEDURE — G8399 PT W/DXA RESULTS DOCUMENT: HCPCS | Performed by: OBSTETRICS & GYNECOLOGY

## 2024-05-31 PROCEDURE — G8417 CALC BMI ABV UP PARAM F/U: HCPCS | Performed by: OBSTETRICS & GYNECOLOGY

## 2024-05-31 PROCEDURE — 3017F COLORECTAL CA SCREEN DOC REV: CPT | Performed by: OBSTETRICS & GYNECOLOGY

## 2024-05-31 PROCEDURE — 1123F ACP DISCUSS/DSCN MKR DOCD: CPT | Performed by: OBSTETRICS & GYNECOLOGY

## 2024-05-31 PROCEDURE — 1036F TOBACCO NON-USER: CPT | Performed by: OBSTETRICS & GYNECOLOGY

## 2024-05-31 PROCEDURE — 1090F PRES/ABSN URINE INCON ASSESS: CPT | Performed by: OBSTETRICS & GYNECOLOGY

## 2024-05-31 RX ORDER — ERGOCALCIFEROL 1.25 MG/1
50000 CAPSULE ORAL WEEKLY
COMMUNITY

## 2024-05-31 RX ORDER — CALCIUM CARBONATE 500(1250)
500 TABLET ORAL DAILY
COMMUNITY

## 2024-06-04 LAB
HPV I/H RISK 4 DNA CVX QL NAA+PROBE: NOT DETECTED
HPV SAMPLE: NORMAL
HPV, INTERPRETATION: NORMAL
HPV16 DNA CVX QL NAA+PROBE: NOT DETECTED
HPV18 DNA CVX QL NAA+PROBE: NOT DETECTED
SPECIMEN DESCRIPTION: NORMAL

## 2024-06-10 LAB — CYTOLOGY REPORT: NORMAL

## 2024-06-13 ENCOUNTER — OFFICE VISIT (OUTPATIENT)
Dept: FAMILY MEDICINE CLINIC | Age: 69
End: 2024-06-13
Payer: MEDICARE

## 2024-06-13 VITALS
HEART RATE: 76 BPM | SYSTOLIC BLOOD PRESSURE: 128 MMHG | WEIGHT: 214 LBS | DIASTOLIC BLOOD PRESSURE: 80 MMHG | HEIGHT: 60 IN | BODY MASS INDEX: 42.01 KG/M2 | OXYGEN SATURATION: 96 %

## 2024-06-13 DIAGNOSIS — R73.03 PREDIABETES: Primary | ICD-10-CM

## 2024-06-13 DIAGNOSIS — E78.2 MIXED HYPERLIPIDEMIA: ICD-10-CM

## 2024-06-13 DIAGNOSIS — R63.5 WEIGHT GAIN: ICD-10-CM

## 2024-06-13 DIAGNOSIS — R79.89 ELEVATED TSH: ICD-10-CM

## 2024-06-13 PROCEDURE — G8417 CALC BMI ABV UP PARAM F/U: HCPCS | Performed by: NURSE PRACTITIONER

## 2024-06-13 PROCEDURE — 1036F TOBACCO NON-USER: CPT | Performed by: NURSE PRACTITIONER

## 2024-06-13 PROCEDURE — G8427 DOCREV CUR MEDS BY ELIG CLIN: HCPCS | Performed by: NURSE PRACTITIONER

## 2024-06-13 PROCEDURE — 1090F PRES/ABSN URINE INCON ASSESS: CPT | Performed by: NURSE PRACTITIONER

## 2024-06-13 PROCEDURE — G8399 PT W/DXA RESULTS DOCUMENT: HCPCS | Performed by: NURSE PRACTITIONER

## 2024-06-13 PROCEDURE — 3017F COLORECTAL CA SCREEN DOC REV: CPT | Performed by: NURSE PRACTITIONER

## 2024-06-13 PROCEDURE — 99214 OFFICE O/P EST MOD 30 MIN: CPT | Performed by: NURSE PRACTITIONER

## 2024-06-13 PROCEDURE — 1123F ACP DISCUSS/DSCN MKR DOCD: CPT | Performed by: NURSE PRACTITIONER

## 2024-06-13 ASSESSMENT — ENCOUNTER SYMPTOMS
NAUSEA: 0
VOMITING: 0
ABDOMINAL PAIN: 0
WHEEZING: 0
SHORTNESS OF BREATH: 0

## 2024-06-13 NOTE — PROGRESS NOTES
[facility]     Date of Visit:  2024  Patient Name: Hyun Najera   Patient :  1955     CHIEF COMPLAINT:     Hyun Najera is a 68 y.o. female who presents today for an general visit to be evaluated for the following condition(s):  Chief Complaint   Patient presents with    Pulmonary Nodule     3 month fu- patient not taking any medications currently states she feels better without them       HISTORY OF PRESENT ILLNESS:     Visit Information    Have you changed or started any medications since your last visit including any over-the-counter medicines, vitamins, or herbal medicines? no   Are you having any side effects from any of your medications? -  no  Have you stopped taking any of your medications? Is so, why? -  no    Have you seen any other physician or provider since your last visit? Yes - Records Obtained  Have you had any other diagnostic tests since your last visit? Yes - Records Obtained  Have you been seen in the emergency room and/or had an admission to a hospital since we last saw you? Yes - Records Obtained  Have you had your routine dental cleaning in the past 6 months? no    Have you activated your [employer] account? If not, what are your barriers? Yes     Patient Care Team:  Irwin Sánchez MD as PCP - General (Family Medicine)  Irwin Sánchez MD as PCP - Empaneled Provider  Khoi Ramirez MD as Consulting Physician (Pulmonology)  Miguel Shaffer MD as Consulting Physician (Orthopedic Surgery)  Arturo Abad DPM as Consulting Physician (Podiatry)    Medical History Review  Past Medical, Family, and Social History reviewed and does contribute to the patient presenting condition    Health Maintenance   Topic Date Due    Respiratory Syncytial Virus (RSV) Pregnant or age 60 yrs+ (1 - 1-dose 60+ series) Never done    Shingles vaccine (2 of 2) 11/15/2022    COVID-19 Vaccine (2023- season) 10/25/2023    A1C test (Diabetic or Prediabetic)

## 2024-06-14 ENCOUNTER — HOSPITAL ENCOUNTER (OUTPATIENT)
Dept: CT IMAGING | Facility: CLINIC | Age: 69
End: 2024-06-14
Payer: MEDICARE

## 2024-06-14 ENCOUNTER — HOSPITAL ENCOUNTER (OUTPATIENT)
Age: 69
Setting detail: SPECIMEN
Discharge: HOME | End: 2024-06-14

## 2024-06-14 DIAGNOSIS — R91.1 PULMONARY NODULE: ICD-10-CM

## 2024-06-14 LAB — CREAT BLD-MCNC: 0.9 MG/DL (ref 0.6–1.4)

## 2024-06-14 PROCEDURE — 2580000003 HC RX 258: Performed by: INTERNAL MEDICINE

## 2024-06-14 PROCEDURE — 6360000004 HC RX CONTRAST MEDICATION: Performed by: INTERNAL MEDICINE

## 2024-06-14 PROCEDURE — 71260 CT THORAX DX C+: CPT

## 2024-06-14 RX ORDER — SODIUM CHLORIDE 0.9 % (FLUSH) 0.9 %
10 SYRINGE (ML) INJECTION PRN
Status: ACTIVE | OUTPATIENT
Start: 2024-06-14

## 2024-06-14 RX ORDER — 0.9 % SODIUM CHLORIDE 0.9 %
80 INTRAVENOUS SOLUTION INTRAVENOUS ONCE
Status: COMPLETED | OUTPATIENT
Start: 2024-06-14 | End: 2024-06-14

## 2024-06-14 RX ADMIN — SODIUM CHLORIDE, PRESERVATIVE FREE 10 ML: 5 INJECTION INTRAVENOUS at 08:44

## 2024-06-14 RX ADMIN — IOPAMIDOL 75 ML: 755 INJECTION, SOLUTION INTRAVENOUS at 08:44

## 2024-06-14 RX ADMIN — SODIUM CHLORIDE 80 ML: 9 INJECTION, SOLUTION INTRAVENOUS at 08:44

## 2024-07-03 ENCOUNTER — HOSPITAL ENCOUNTER (OUTPATIENT)
Dept: PREADMISSION TESTING | Age: 69
Discharge: HOME | End: 2024-07-03
Attending: SURGERY | Admitting: SURGERY
Payer: MEDICARE

## 2024-07-03 VITALS
SYSTOLIC BLOOD PRESSURE: 147 MMHG | TEMPERATURE: 97.5 F | DIASTOLIC BLOOD PRESSURE: 65 MMHG | HEIGHT: 60 IN | BODY MASS INDEX: 39.27 KG/M2 | HEART RATE: 66 BPM | OXYGEN SATURATION: 96 % | RESPIRATION RATE: 18 BRPM | WEIGHT: 200 LBS

## 2024-07-03 LAB
ANION GAP SERPL CALCULATED.3IONS-SCNC: 13 MMOL/L (ref 9–17)
BASOPHILS # BLD: 0.1 K/UL (ref 0–0.2)
BASOPHILS NFR BLD: 1 % (ref 0–2)
BUN SERPL-MCNC: 14 MG/DL (ref 8–23)
CALCIUM SERPL-MCNC: 8.3 MG/DL (ref 8.6–10.4)
CHLORIDE SERPL-SCNC: 107 MMOL/L (ref 98–107)
CO2 SERPL-SCNC: 26 MMOL/L (ref 20–31)
CREAT SERPL-MCNC: 0.8 MG/DL (ref 0.5–0.9)
EKG ATRIAL RATE: 67 BPM
EKG P AXIS: 35 DEGREES
EKG P-R INTERVAL: 148 MS
EKG Q-T INTERVAL: 412 MS
EKG QRS DURATION: 76 MS
EKG QTC CALCULATION (BAZETT): 435 MS
EKG R AXIS: 25 DEGREES
EKG T AXIS: 46 DEGREES
EKG VENTRICULAR RATE: 67 BPM
EOSINOPHIL # BLD: 0.5 K/UL (ref 0–0.4)
EOSINOPHILS RELATIVE PERCENT: 6 % (ref 0–4)
ERYTHROCYTE [DISTWIDTH] IN BLOOD BY AUTOMATED COUNT: 14.1 % (ref 11.5–14.9)
GFR, ESTIMATED: 79 ML/MIN/1.73M2
GLUCOSE SERPL-MCNC: 118 MG/DL (ref 70–99)
HCT VFR BLD AUTO: 39.4 % (ref 36–46)
HGB BLD-MCNC: 12.9 G/DL (ref 12–16)
LYMPHOCYTES NFR BLD: 2.5 K/UL (ref 1–4.8)
LYMPHOCYTES RELATIVE PERCENT: 31 % (ref 24–44)
MCH RBC QN AUTO: 27.8 PG (ref 26–34)
MCHC RBC AUTO-ENTMCNC: 32.8 G/DL (ref 31–37)
MCV RBC AUTO: 84.8 FL (ref 80–100)
MONOCYTES NFR BLD: 0.6 K/UL (ref 0.1–1.3)
MONOCYTES NFR BLD: 8 % (ref 1–7)
NEUTROPHILS NFR BLD: 54 % (ref 36–66)
NEUTS SEG NFR BLD: 4.4 K/UL (ref 1.3–9.1)
PLATELET # BLD AUTO: 276 K/UL (ref 150–450)
PMV BLD AUTO: 7.4 FL (ref 6–12)
POTASSIUM SERPL-SCNC: 4.2 MMOL/L (ref 3.7–5.3)
RBC # BLD AUTO: 4.64 M/UL (ref 4–5.2)
SODIUM SERPL-SCNC: 146 MMOL/L (ref 135–144)
WBC OTHER # BLD: 8 K/UL (ref 3.5–11)

## 2024-07-03 PROCEDURE — 80048 BASIC METABOLIC PNL TOTAL CA: CPT

## 2024-07-03 PROCEDURE — 36415 COLL VENOUS BLD VENIPUNCTURE: CPT

## 2024-07-03 PROCEDURE — 93005 ELECTROCARDIOGRAM TRACING: CPT | Performed by: ANESTHESIOLOGY

## 2024-07-03 PROCEDURE — 93010 ELECTROCARDIOGRAM REPORT: CPT | Performed by: INTERNAL MEDICINE

## 2024-07-03 PROCEDURE — 85025 COMPLETE CBC W/AUTO DIFF WBC: CPT

## 2024-07-03 NOTE — DISCHARGE INSTRUCTIONS
Pre-op Instructions For Out-Patient Surgery    Medication Instructions:  Please stop herbs and any supplements now (includes vitamins and minerals).    Please contact your surgeon and prescribing physician for pre-op instructions for any blood thinners.    If you have inhalers/aerosol treatments at home, please use them the morning of your surgery and bring the inhalers with you to the hospital.    Please take the following medications the morning of your surgery with a sip of water:    Inhaler    Surgery Instructions:  After midnight before surgery:  Do not eat or drink anything, including water, mints, gum, and hard candy.  You may brush your teeth without swallowing.  No smoking, chewing tobacco, or street drugs.    Please shower or bathe before surgery.  If you were given Surgical Scrub Chlorhexidine Gluconate Liquid (CHG), please shower the night before and the morning of your surgery following the detailed instructions you received during your pre-admission visit.     Please do not wear any cologne, lotion, powder, deodorant, jewelry, piercings, perfume, makeup, nail polish, hair accessories, or hair spray on the day of surgery.  Wear loose comfortable clothing.    Leave your valuables at home but bring a payment source for any after-surgery prescriptions you plan to fill at [facility].  Bring a storage case for any glasses/contacts.    An adult who is responsible for you MUST drive you home and should be with you for the first 24 hours after surgery.     If having out-patient knee and foot surgeries, please arrange for planned crutches, walker, or wheelchair before arriving to the hospital.    The Day of Surgery:  Arrive at [facility] Surgery Entrance at the time directed by your surgeon and check in at the desk.     If you have a living will or healthcare power of , please bring a copy.    You will be taken to the pre-op holding area where you will

## 2024-07-16 ENCOUNTER — ANESTHESIA EVENT (OUTPATIENT)
Dept: OPERATING ROOM | Age: 69
End: 2024-07-16
Payer: MEDICARE

## 2024-07-16 ENCOUNTER — OFFICE VISIT (OUTPATIENT)
Dept: ONCOLOGY | Age: 69
End: 2024-07-16
Payer: MEDICARE

## 2024-07-16 ENCOUNTER — TELEPHONE (OUTPATIENT)
Dept: ONCOLOGY | Age: 69
End: 2024-07-16

## 2024-07-16 VITALS
TEMPERATURE: 96.8 F | WEIGHT: 216 LBS | DIASTOLIC BLOOD PRESSURE: 82 MMHG | BODY MASS INDEX: 42.18 KG/M2 | RESPIRATION RATE: 18 BRPM | HEART RATE: 61 BPM | OXYGEN SATURATION: 96 % | SYSTOLIC BLOOD PRESSURE: 138 MMHG

## 2024-07-16 DIAGNOSIS — R91.1 PULMONARY NODULE: Primary | ICD-10-CM

## 2024-07-16 PROCEDURE — 1090F PRES/ABSN URINE INCON ASSESS: CPT | Performed by: INTERNAL MEDICINE

## 2024-07-16 PROCEDURE — 1036F TOBACCO NON-USER: CPT | Performed by: INTERNAL MEDICINE

## 2024-07-16 PROCEDURE — G8399 PT W/DXA RESULTS DOCUMENT: HCPCS | Performed by: INTERNAL MEDICINE

## 2024-07-16 PROCEDURE — G8417 CALC BMI ABV UP PARAM F/U: HCPCS | Performed by: INTERNAL MEDICINE

## 2024-07-16 PROCEDURE — G8427 DOCREV CUR MEDS BY ELIG CLIN: HCPCS | Performed by: INTERNAL MEDICINE

## 2024-07-16 PROCEDURE — 1123F ACP DISCUSS/DSCN MKR DOCD: CPT | Performed by: INTERNAL MEDICINE

## 2024-07-16 PROCEDURE — 99214 OFFICE O/P EST MOD 30 MIN: CPT | Performed by: INTERNAL MEDICINE

## 2024-07-16 PROCEDURE — 3017F COLORECTAL CA SCREEN DOC REV: CPT | Performed by: INTERNAL MEDICINE

## 2024-07-16 NOTE — TELEPHONE ENCOUNTER
Instructions   from Dr. Jose F Herron MD    RTC in 6 months w Ct chest    Patient scheduled for 6 month f/u 01/14/2025 at 9:30 am.  Patient provided scheduling number to schedule CT before f/u.

## 2024-07-16 NOTE — PRE-PROCEDURE INSTRUCTIONS
No answer, left message ?                             Unable to leave message ?    When were you told to arrive at hospital ?  -0900    Do you have a  ?-YES    Are you on any blood thinners ? -N/A                    If yes when did you stop taking ?    Do you have your prep Rx filled and instruction ?  -N/A    Nothing to eat the day before , only clear liquids.-N/A    Are you experiencing any covid symptoms ? -NONE    Do you have any infections or rash we should be aware of ?-NONE      Do you have the Hibiclens soap to use the night before and the morning of surgery ?-YES    Nothing to eat or drink after midnight, only a sip of water to take any medication instructed to take the night before.-INSTRUCTED    Wear comfortable clothing, leave any valuables at home, remove any jewelry and body piercing .-INSTRUCTED

## 2024-07-16 NOTE — PROGRESS NOTES
Patient ID: Hyun Najera, 1955, [id number], 68 y.o.  Referred by : No referring provider defined for this encounter.   Reason for consultation:   Lung nodule  HISTORY OF PRESENT ILLNESS:    Oncologic History:  Hyun Najera is a 68 y.o. female was seen in urology consult close this with discovered lung nodule.    Patient recently presented to ER on 3/12/2024 with pain in the right rib and right hand and right back after recent fall.  Her CT of the chest abdomen pelvis on 3/12/2024 showed no acute injury to chest abdomen pelvis and spine however showed right lower lobe solid pulmonary nodule measuring about 1.4 cm.  Additionally indeterminate right adrenal nodule measuring up to 3.3 cm noted.  She has a history of smoking and quit smoking about 35 years ago.  She denies any unintentional weight loss, drenching night sweats fever chills.  She does complain of pain in the right side of the chest from her rib fracture.    She also planning cholecystectomy tomorrow with general surgeon Dr Grewal.  She had a PET scan yesterday which showed the nodule appearing less dense than the previous exam and overall findings suggestive of atelectasis with very low likelihood for malignancy.  The PET scan did show healing right rib fractures and the adrenal nodule does not show any metabolic activity suggesting likely adenoma.    INTERVAL HISTORY:  Patient is return for follow visit and to discuss lab results, imaging studies and further recommendations.  Her recent CT scan showed resolution of the previously seen right lower lobe lung nodule.  Clinically there is no evidence of recurrence.  She denies any chest pain shortness of breath, cough.  Denies any unintentional weight loss night sweats fever chills.  She does report mild abdominal discomfort from her abdominal hernia and planning to see have surgery tomorrow    During this visit patient's allergy, social, medical, surgical history and medications were reviewed

## 2024-07-17 ENCOUNTER — HOSPITAL ENCOUNTER (OUTPATIENT)
Age: 69
Setting detail: SURGERY ADMIT
Discharge: HOME | End: 2024-07-17
Attending: SURGERY | Admitting: SURGERY
Payer: MEDICARE

## 2024-07-17 ENCOUNTER — ANESTHESIA (OUTPATIENT)
Dept: OPERATING ROOM | Age: 69
End: 2024-07-17
Payer: MEDICARE

## 2024-07-17 VITALS
OXYGEN SATURATION: 95 % | HEART RATE: 100 BPM | RESPIRATION RATE: 18 BRPM | HEIGHT: 60 IN | DIASTOLIC BLOOD PRESSURE: 90 MMHG | BODY MASS INDEX: 41.23 KG/M2 | SYSTOLIC BLOOD PRESSURE: 158 MMHG | WEIGHT: 210 LBS | TEMPERATURE: 97.1 F

## 2024-07-17 DIAGNOSIS — K42.0 INCARCERATED UMBILICAL HERNIA: Primary | ICD-10-CM

## 2024-07-17 PROCEDURE — 7100000000 HC PACU RECOVERY - FIRST 15 MIN: Performed by: SURGERY

## 2024-07-17 PROCEDURE — 2580000003 HC RX 258: Performed by: ANESTHESIOLOGY

## 2024-07-17 PROCEDURE — 94761 N-INVAS EAR/PLS OXIMETRY MLT: CPT

## 2024-07-17 PROCEDURE — 6360000002 HC RX W HCPCS: Performed by: SURGERY

## 2024-07-17 PROCEDURE — 6360000002 HC RX W HCPCS: Performed by: NURSE PRACTITIONER

## 2024-07-17 PROCEDURE — C1781 MESH (IMPLANTABLE): HCPCS | Performed by: SURGERY

## 2024-07-17 PROCEDURE — 3700000001 HC ADD 15 MINUTES (ANESTHESIA): Performed by: SURGERY

## 2024-07-17 PROCEDURE — 94640 AIRWAY INHALATION TREATMENT: CPT

## 2024-07-17 PROCEDURE — 2500000003 HC RX 250 WO HCPCS

## 2024-07-17 PROCEDURE — 7100000011 HC PHASE II RECOVERY - ADDTL 15 MIN: Performed by: SURGERY

## 2024-07-17 PROCEDURE — 3600000012 HC SURGERY LEVEL 2 ADDTL 15MIN: Performed by: SURGERY

## 2024-07-17 PROCEDURE — 2500000003 HC RX 250 WO HCPCS: Performed by: ANESTHESIOLOGY

## 2024-07-17 PROCEDURE — 49592 RPR AA HRN 1ST < 3 NCR/STRN: CPT | Performed by: SURGERY

## 2024-07-17 PROCEDURE — 6370000000 HC RX 637 (ALT 250 FOR IP): Performed by: ANESTHESIOLOGY

## 2024-07-17 PROCEDURE — 94664 DEMO&/EVAL PT USE INHALER: CPT

## 2024-07-17 PROCEDURE — 3600000002 HC SURGERY LEVEL 2 BASE: Performed by: SURGERY

## 2024-07-17 PROCEDURE — 7100000001 HC PACU RECOVERY - ADDTL 15 MIN: Performed by: SURGERY

## 2024-07-17 PROCEDURE — 6360000002 HC RX W HCPCS

## 2024-07-17 PROCEDURE — 3700000000 HC ANESTHESIA ATTENDED CARE: Performed by: SURGERY

## 2024-07-17 PROCEDURE — 2700000000 HC OXYGEN THERAPY PER DAY

## 2024-07-17 PROCEDURE — 7100000030 HC ASPR PHASE II RECOVERY - FIRST 15 MIN: Performed by: SURGERY

## 2024-07-17 PROCEDURE — 2709999900 HC NON-CHARGEABLE SUPPLY: Performed by: SURGERY

## 2024-07-17 PROCEDURE — 7100000010 HC PHASE II RECOVERY - FIRST 15 MIN: Performed by: SURGERY

## 2024-07-17 PROCEDURE — 7100000031 HC ASPR PHASE II RECOVERY - ADDTL 15 MIN: Performed by: SURGERY

## 2024-07-17 PROCEDURE — 6370000000 HC RX 637 (ALT 250 FOR IP)

## 2024-07-17 DEVICE — VENTRALEX ST HERNIA PATCH, 4.3 CM (1.7"), CIRCLE
Type: IMPLANTABLE DEVICE | Site: UMBILICAL | Status: FUNCTIONAL
Brand: VENTRALEX

## 2024-07-17 RX ORDER — OXYCODONE HYDROCHLORIDE AND ACETAMINOPHEN 5; 325 MG/1; MG/1
1 TABLET ORAL EVERY 6 HOURS PRN
Qty: 28 TABLET | Refills: 0 | Status: SHIPPED | OUTPATIENT
Start: 2024-07-17 | End: 2024-07-24

## 2024-07-17 RX ORDER — ONDANSETRON 2 MG/ML
4 INJECTION INTRAMUSCULAR; INTRAVENOUS
Status: DISCONTINUED | OUTPATIENT
Start: 2024-07-17 | End: 2024-07-17 | Stop reason: HOSPADM

## 2024-07-17 RX ORDER — GLYCOPYRROLATE 0.2 MG/ML
INJECTION INTRAMUSCULAR; INTRAVENOUS PRN
Status: DISCONTINUED | OUTPATIENT
Start: 2024-07-17 | End: 2024-07-17 | Stop reason: SDUPTHER

## 2024-07-17 RX ORDER — HYDRALAZINE HYDROCHLORIDE 20 MG/ML
10 INJECTION INTRAMUSCULAR; INTRAVENOUS
Status: DISCONTINUED | OUTPATIENT
Start: 2024-07-17 | End: 2024-07-17 | Stop reason: HOSPADM

## 2024-07-17 RX ORDER — PROPOFOL 10 MG/ML
INJECTION, EMULSION INTRAVENOUS PRN
Status: DISCONTINUED | OUTPATIENT
Start: 2024-07-17 | End: 2024-07-17 | Stop reason: SDUPTHER

## 2024-07-17 RX ORDER — IPRATROPIUM BROMIDE AND ALBUTEROL SULFATE 2.5; .5 MG/3ML; MG/3ML
1 SOLUTION RESPIRATORY (INHALATION) ONCE
Status: COMPLETED | OUTPATIENT
Start: 2024-07-17 | End: 2024-07-17

## 2024-07-17 RX ORDER — DOXYCYCLINE HYCLATE 100 MG
100 TABLET ORAL 2 TIMES DAILY
Qty: 28 TABLET | Refills: 0 | Status: SHIPPED | OUTPATIENT
Start: 2024-07-17 | End: 2024-07-31

## 2024-07-17 RX ORDER — MIDAZOLAM HYDROCHLORIDE 1 MG/ML
INJECTION INTRAMUSCULAR; INTRAVENOUS PRN
Status: DISCONTINUED | OUTPATIENT
Start: 2024-07-17 | End: 2024-07-17 | Stop reason: SDUPTHER

## 2024-07-17 RX ORDER — METOCLOPRAMIDE HYDROCHLORIDE 5 MG/ML
10 INJECTION INTRAMUSCULAR; INTRAVENOUS
Status: DISCONTINUED | OUTPATIENT
Start: 2024-07-17 | End: 2024-07-17 | Stop reason: HOSPADM

## 2024-07-17 RX ORDER — GABAPENTIN 100 MG/1
100 CAPSULE ORAL ONCE
Status: COMPLETED | OUTPATIENT
Start: 2024-07-17 | End: 2024-07-17

## 2024-07-17 RX ORDER — LABETALOL HYDROCHLORIDE 5 MG/ML
10 INJECTION, SOLUTION INTRAVENOUS
Status: DISCONTINUED | OUTPATIENT
Start: 2024-07-17 | End: 2024-07-17 | Stop reason: HOSPADM

## 2024-07-17 RX ORDER — SODIUM CHLORIDE 9 MG/ML
INJECTION, SOLUTION INTRAVENOUS PRN
Status: DISCONTINUED | OUTPATIENT
Start: 2024-07-17 | End: 2024-07-17 | Stop reason: HOSPADM

## 2024-07-17 RX ORDER — CEPHALEXIN 500 MG/1
CAPSULE ORAL
Qty: 21 CAPSULE | Refills: 0 | Status: SHIPPED | OUTPATIENT
Start: 2024-07-17

## 2024-07-17 RX ORDER — ONDANSETRON 2 MG/ML
INJECTION INTRAMUSCULAR; INTRAVENOUS PRN
Status: DISCONTINUED | OUTPATIENT
Start: 2024-07-17 | End: 2024-07-17 | Stop reason: SDUPTHER

## 2024-07-17 RX ORDER — BUPIVACAINE HYDROCHLORIDE 5 MG/ML
INJECTION, SOLUTION EPIDURAL; INTRACAUDAL PRN
Status: DISCONTINUED | OUTPATIENT
Start: 2024-07-17 | End: 2024-07-17 | Stop reason: ALTCHOICE

## 2024-07-17 RX ORDER — ONDANSETRON 4 MG/1
TABLET, FILM COATED ORAL
Qty: 20 TABLET | Refills: 0 | Status: SHIPPED | OUTPATIENT
Start: 2024-07-17

## 2024-07-17 RX ORDER — DIPHENHYDRAMINE HYDROCHLORIDE 50 MG/ML
12.5 INJECTION INTRAMUSCULAR; INTRAVENOUS
Status: DISCONTINUED | OUTPATIENT
Start: 2024-07-17 | End: 2024-07-17 | Stop reason: HOSPADM

## 2024-07-17 RX ORDER — ACETAMINOPHEN 325 MG/1
650 TABLET ORAL
Status: DISCONTINUED | OUTPATIENT
Start: 2024-07-17 | End: 2024-07-17 | Stop reason: HOSPADM

## 2024-07-17 RX ORDER — SODIUM CHLORIDE 0.9 % (FLUSH) 0.9 %
5-40 SYRINGE (ML) INJECTION EVERY 12 HOURS SCHEDULED
Status: DISCONTINUED | OUTPATIENT
Start: 2024-07-17 | End: 2024-07-17 | Stop reason: HOSPADM

## 2024-07-17 RX ORDER — MEPERIDINE HYDROCHLORIDE 25 MG/ML
12.5 INJECTION INTRAMUSCULAR; INTRAVENOUS; SUBCUTANEOUS EVERY 5 MIN PRN
Status: DISCONTINUED | OUTPATIENT
Start: 2024-07-17 | End: 2024-07-17 | Stop reason: HOSPADM

## 2024-07-17 RX ORDER — LIDOCAINE HYDROCHLORIDE 10 MG/ML
INJECTION, SOLUTION EPIDURAL; INFILTRATION; INTRACAUDAL; PERINEURAL PRN
Status: DISCONTINUED | OUTPATIENT
Start: 2024-07-17 | End: 2024-07-17 | Stop reason: SDUPTHER

## 2024-07-17 RX ORDER — DEXAMETHASONE SODIUM PHOSPHATE 4 MG/ML
INJECTION, SOLUTION INTRA-ARTICULAR; INTRALESIONAL; INTRAMUSCULAR; INTRAVENOUS; SOFT TISSUE PRN
Status: DISCONTINUED | OUTPATIENT
Start: 2024-07-17 | End: 2024-07-17 | Stop reason: SDUPTHER

## 2024-07-17 RX ORDER — FENTANYL CITRATE 0.05 MG/ML
25 INJECTION, SOLUTION INTRAMUSCULAR; INTRAVENOUS EVERY 5 MIN PRN
Status: DISCONTINUED | OUTPATIENT
Start: 2024-07-17 | End: 2024-07-17 | Stop reason: HOSPADM

## 2024-07-17 RX ORDER — ROCURONIUM BROMIDE 10 MG/ML
INJECTION, SOLUTION INTRAVENOUS PRN
Status: DISCONTINUED | OUTPATIENT
Start: 2024-07-17 | End: 2024-07-17 | Stop reason: SDUPTHER

## 2024-07-17 RX ORDER — FENTANYL CITRATE 50 UG/ML
INJECTION, SOLUTION INTRAMUSCULAR; INTRAVENOUS PRN
Status: DISCONTINUED | OUTPATIENT
Start: 2024-07-17 | End: 2024-07-17 | Stop reason: SDUPTHER

## 2024-07-17 RX ORDER — ACETAMINOPHEN 500 MG
1000 TABLET ORAL ONCE
Status: COMPLETED | OUTPATIENT
Start: 2024-07-17 | End: 2024-07-17

## 2024-07-17 RX ORDER — SODIUM CHLORIDE 0.9 % (FLUSH) 0.9 %
5-40 SYRINGE (ML) INJECTION PRN
Status: DISCONTINUED | OUTPATIENT
Start: 2024-07-17 | End: 2024-07-17 | Stop reason: HOSPADM

## 2024-07-17 RX ORDER — LIDOCAINE HYDROCHLORIDE 10 MG/ML
1 INJECTION, SOLUTION EPIDURAL; INFILTRATION; INTRACAUDAL; PERINEURAL
Status: COMPLETED | OUTPATIENT
Start: 2024-07-17 | End: 2024-07-17

## 2024-07-17 RX ORDER — NALOXONE HYDROCHLORIDE 0.4 MG/ML
INJECTION, SOLUTION INTRAMUSCULAR; INTRAVENOUS; SUBCUTANEOUS PRN
Status: DISCONTINUED | OUTPATIENT
Start: 2024-07-17 | End: 2024-07-17 | Stop reason: HOSPADM

## 2024-07-17 RX ORDER — ALBUTEROL SULFATE 90 UG/1
AEROSOL, METERED RESPIRATORY (INHALATION) PRN
Status: DISCONTINUED | OUTPATIENT
Start: 2024-07-17 | End: 2024-07-17 | Stop reason: SDUPTHER

## 2024-07-17 RX ORDER — SODIUM CHLORIDE, SODIUM LACTATE, POTASSIUM CHLORIDE, CALCIUM CHLORIDE 600; 310; 30; 20 MG/100ML; MG/100ML; MG/100ML; MG/100ML
INJECTION, SOLUTION INTRAVENOUS CONTINUOUS
Status: DISCONTINUED | OUTPATIENT
Start: 2024-07-17 | End: 2024-07-17 | Stop reason: HOSPADM

## 2024-07-17 RX ADMIN — ROCURONIUM BROMIDE 40 MG: 10 INJECTION, SOLUTION INTRAVENOUS at 11:35

## 2024-07-17 RX ADMIN — IPRATROPIUM BROMIDE AND ALBUTEROL SULFATE 1 DOSE: 2.5; .5 SOLUTION RESPIRATORY (INHALATION) at 12:54

## 2024-07-17 RX ADMIN — ONDANSETRON 4 MG: 2 INJECTION INTRAMUSCULAR; INTRAVENOUS at 12:27

## 2024-07-17 RX ADMIN — FENTANYL CITRATE 50 MCG: 50 INJECTION, SOLUTION INTRAMUSCULAR; INTRAVENOUS at 11:55

## 2024-07-17 RX ADMIN — Medication 2000 MG: at 11:40

## 2024-07-17 RX ADMIN — MIDAZOLAM 1 MG: 1 INJECTION INTRAMUSCULAR; INTRAVENOUS at 11:38

## 2024-07-17 RX ADMIN — DEXAMETHASONE SODIUM PHOSPHATE 8 MG: 4 INJECTION INTRA-ARTICULAR; INTRALESIONAL; INTRAMUSCULAR; INTRAVENOUS; SOFT TISSUE at 11:39

## 2024-07-17 RX ADMIN — ACETAMINOPHEN 1000 MG: 500 TABLET ORAL at 09:30

## 2024-07-17 RX ADMIN — GABAPENTIN 100 MG: 100 CAPSULE ORAL at 09:30

## 2024-07-17 RX ADMIN — Medication 6 PUFF: at 12:36

## 2024-07-17 RX ADMIN — ROCURONIUM BROMIDE 10 MG: 10 INJECTION, SOLUTION INTRAVENOUS at 11:54

## 2024-07-17 RX ADMIN — LIDOCAINE HYDROCHLORIDE 50 MG: 10 INJECTION, SOLUTION EPIDURAL; INFILTRATION; INTRACAUDAL; PERINEURAL at 11:34

## 2024-07-17 RX ADMIN — SUGAMMADEX 200 MG: 100 INJECTION, SOLUTION INTRAVENOUS at 12:32

## 2024-07-17 RX ADMIN — FENTANYL CITRATE 25 MCG: 50 INJECTION, SOLUTION INTRAMUSCULAR; INTRAVENOUS at 12:04

## 2024-07-17 RX ADMIN — PROPOFOL 150 MG: 10 INJECTION, EMULSION INTRAVENOUS at 11:34

## 2024-07-17 RX ADMIN — MIDAZOLAM 1 MG: 1 INJECTION INTRAMUSCULAR; INTRAVENOUS at 11:34

## 2024-07-17 RX ADMIN — GLYCOPYRROLATE 0.2 MG: 0.2 INJECTION INTRAMUSCULAR; INTRAVENOUS at 11:49

## 2024-07-17 RX ADMIN — LIDOCAINE HYDROCHLORIDE 1 ML: 10 INJECTION, SOLUTION EPIDURAL; INFILTRATION; INTRACAUDAL; PERINEURAL at 09:37

## 2024-07-17 RX ADMIN — FENTANYL CITRATE 25 MCG: 50 INJECTION, SOLUTION INTRAMUSCULAR; INTRAVENOUS at 11:34

## 2024-07-17 RX ADMIN — SODIUM CHLORIDE, POTASSIUM CHLORIDE, SODIUM LACTATE AND CALCIUM CHLORIDE: 600; 310; 30; 20 INJECTION, SOLUTION INTRAVENOUS at 09:37

## 2024-07-17 ASSESSMENT — PAIN DESCRIPTION - ONSET: ONSET: GRADUAL

## 2024-07-17 ASSESSMENT — PAIN SCALES - GENERAL
PAINLEVEL_OUTOF10: 4
PAINLEVEL_OUTOF10: 2
PAINLEVEL_OUTOF10: 4
PAINLEVEL_OUTOF10: 2

## 2024-07-17 ASSESSMENT — ENCOUNTER SYMPTOMS
ABDOMINAL PAIN: 0
NAUSEA: 0
SINUS PRESSURE: 0
SHORTNESS OF BREATH: 0

## 2024-07-17 ASSESSMENT — PAIN DESCRIPTION - DESCRIPTORS
DESCRIPTORS: BURNING
DESCRIPTORS: SORE

## 2024-07-17 ASSESSMENT — PAIN - FUNCTIONAL ASSESSMENT
PAIN_FUNCTIONAL_ASSESSMENT: NONE - DENIES PAIN
PAIN_FUNCTIONAL_ASSESSMENT: 0-10

## 2024-07-17 ASSESSMENT — PAIN DESCRIPTION - LOCATION: LOCATION: UMBILICUS

## 2024-07-17 ASSESSMENT — PAIN DESCRIPTION - PAIN TYPE: TYPE: SURGICAL PAIN

## 2024-07-17 NOTE — BRIEF OP NOTE
Brief Postoperative Note      Patient: Hyun Najera  YOB: 1955  MRN: [id number]    Date of Procedure: 7/17/2024    Pre-Op Diagnosis Codes:     * Umbilical hernia without obstruction and without gangrene [K42.9]    Post-Op Diagnosis: Same       Procedure(s):  OPEN HERNIA UMBILICAL REPAIR  WITH  MESH    Surgeon(s):  Oswaldo Grewal MD    Assistant:  * No surgical staff found *    Anesthesia: General    Estimated Blood Loss (mL): less than 50     Complications: None    Specimens:   * No specimens in log *    Implants:  * No implants in log *      Drains: * No LDAs found *    Findings:  Infection Present At Time Of Surgery (PATOS) (choose all levels that have infection present):  No infection present  Other Findings: dictated    Electronically signed by Oswaldo Grewal MD on 7/17/2024 at 11:40 AM

## 2024-07-17 NOTE — ANESTHESIA POSTPROCEDURE EVALUATION
Department of Anesthesiology  Postprocedure Note    Patient: Hyun Najera  MRN: [id number]  YOB: 1955  Date of evaluation: 7/17/2024    Procedure Summary       Date: 07/17/24 Room / Location: [address]    Anesthesia Start: 1130 Anesthesia Stop: 1249    Procedure: OPEN HERNIA UMBILICAL REPAIR  WITH  MESH (Abdomen) Diagnosis:       Umbilical hernia without obstruction and without gangrene      (Umbilical hernia without obstruction and without gangrene [K42.9])    Surgeons: Oswaldo Grewal MD Responsible Provider: Robby Mehta MD    Anesthesia Type: general ASA Status: 3            Anesthesia Type: No value filed.    Don Phase I: Don Score: 10    Don Phase II: Don Score: 10    Anesthesia Post Evaluation    Comments: POST- ANESTHESIA EVALUATION       Pt Name: Hyun Najera  MRN: [id number]  YOB: 1955  Date of evaluation: 7/17/2024  Time:  7:14 PM      BP (!) 158/90   Pulse 100   Temp 97.1 °F (36.2 °C) (Infrared)   Resp 18   Ht 1.524 m (5')   Wt 95.3 kg (210 lb)   SpO2 95%   BMI 41.01 kg/m²      Consciousness Level  Awake  Cardiopulmonary Status  Stable  Pain Adequately Treated YES  Nausea / Vomiting  NO  Adequate Hydration  YES  Anesthesia Related Complications NONE      Electronically signed by Robby Mehta MD on 7/17/2024 at 7:14 PM           No notable events documented.

## 2024-07-17 NOTE — DISCHARGE INSTRUCTIONS
Dr. Grewal Post-Op Orders for Outpatient    1.) Diet:    [x]  As tolerated  []  Special     2.) Activity:    [x]  No lifting more than five to ten pounds 4-6 weeks  [x]  May Shower tomorrow  [x]  No driving until off pain medications     3.) Dressing:    [x]  Remove top dressing in 48 hours   [x]  Leave steri strips on     [x]  Remove and change dressing sooner if soaked    4.) Medication:    [x]  Take medication as prescribed   []  Resume blood thinners in  days    [x]  Resume home medications per AVS Summary    5.) Office visit: Follow up with Dr. Grewal. Call to schedule an appointment if one has not been made.     6.) Call [phone] if you have any questions or concerns.    Electronically signed by Oswaldo Grewal MD on 7/17/2024 at 11:42 AM    DISCHARGE INSTRUCTIONS FOR GENERAL ANESTHESIA    In order to continue your care at home, please follow the instructions below.    Anesthesia - Do not drink any alcoholic beverages or make any legal or important decisions for 24 hours. If your surgery was on an extremity or abdomen, do not drive or operate any machinery until your surgeon approves, otherwise do not drive or operate any machinery for 24 hours or as restricted by your surgeon.     Pain Medications - Please do not drive, operate machinery, or drink alcoholic beverages while taking any prescribed pain medication.     Diet -  Start out eating lightly (broth, soup, crackers, toast, etc.) advancing as tolerated to your usual diet.  Try to avoid spicy and greasy/fatty foods for 24 hours. Drink plenty of fluids after surgery, unless you are on a fluid restriction.  Avoid milk/milk product for several hours.    Call your surgeon for the following:  You have pain that does not get better after you take pain medicine.   For an oral temperature (by mouth) is 101 degrees or higher, chills, or excessive sweating.  You have increasing and progressive bleeding or drainage from surgery site.  Signs of an infection:

## 2024-07-17 NOTE — PROGRESS NOTES
CLINICAL PHARMACY NOTE: MEDS TO BEDS    Total # of Prescriptions Filled: 4   The following medications were delivered to the patient:  Oxycodone/APAP 5-325MG Tablets  Ondansetron HCL 4MG Tablets  Doxycycline Hyclate 100MG Tablets  Cephalexin 500MG Capsule    Additional Documentation:  Picked up at the pharmacy by Merced Najera,  of the PT at 1:42PM 7/17/24

## 2024-07-17 NOTE — ANESTHESIA PRE PROCEDURE
visit.                                           BP Readings from Last 3 Encounters:   07/16/24 138/82   07/03/24 (!) 147/65   06/13/24 128/80       NPO Status:                                                                                 BMI:   Wt Readings from Last 3 Encounters:   07/16/24 98 kg (216 lb)   07/03/24 90.7 kg (200 lb)   06/13/24 97.1 kg (214 lb)     There is no height or weight on file to calculate BMI.    CBC:   Lab Results   Component Value Date/Time    WBC 8.0 07/03/2024 08:15 AM    RBC 4.64 07/03/2024 08:15 AM    HGB 12.9 07/03/2024 08:15 AM    HCT 39.4 07/03/2024 08:15 AM    MCV 84.8 07/03/2024 08:15 AM    RDW 14.1 07/03/2024 08:15 AM     07/03/2024 08:15 AM       CMP:   Lab Results   Component Value Date/Time     07/03/2024 08:15 AM    K 4.2 07/03/2024 08:15 AM     07/03/2024 08:15 AM    CO2 26 07/03/2024 08:15 AM    BUN 14 07/03/2024 08:15 AM    CREATININE 0.8 07/03/2024 08:15 AM    LABGLOM 79 07/03/2024 08:15 AM    LABGLOM >60 03/12/2024 06:46 PM    LABGLOM 74 02/24/2024 12:00 AM    GLUCOSE 118 07/03/2024 08:15 AM    CALCIUM 8.3 07/03/2024 08:15 AM    BILITOT 0.4 02/24/2024 12:00 AM    ALKPHOS 126 02/24/2024 12:00 AM    AST 13 02/24/2024 12:00 AM    ALT 17 02/24/2024 12:00 AM       POC Tests: No results for input(s): \"POCGLU\", \"POCNA\", \"POCK\", \"POCCL\", \"POCBUN\", \"POCHEMO\", \"POCHCT\" in the last 72 hours.    Coags: No results found for: \"PROTIME\", \"INR\", \"APTT\"    HCG (If Applicable): No results found for: \"PREGTESTUR\", \"PREGSERUM\", \"HCG\", \"HCGQUANT\"     ABGs: No results found for: \"PHART\", \"PO2ART\", \"NQA6ECW\", \"IVA0TKM\", \"BEART\", \"W6NQGGXM\"     Type & Screen (If Applicable):  No results found for: \"LABABO\"    Drug/Infectious Status (If Applicable):  No results found for: \"HIV\", \"HEPCAB\"    COVID-19 Screening (If Applicable):   Lab Results   Component Value Date/Time    COVID19 Not Detected 10/12/2022 07:35 PM           Anesthesia Evaluation  Patient summary reviewed and

## 2024-07-17 NOTE — OP NOTE
[facility] General Surgery   Oswaldo Grewal MD, FACS  Nara SUNSHINEJinny Octavio, APRN-CNP  [address], [address]  [place] [address]  P: [phone], F: [phone]      Preoperative diagnosis: Incarcerated umbilical hernia    Postoperative diagnosis: Same.  Hernia defect less than 3 cm.    Procedure: Open repair incarcerated umbilical hernia with small Ventralex ST mesh    Surgeon: Dr. Grewal    First Assist: None    Anesthesia: General    Preparation: Chloraprep    EBL: Less than 10 mL    Specimen: None    Procedure: Informed consent was obtained.  Preoperative antibiotic was given.  Patient was taken to the operating room.  General anesthesia was given.  Abdomen was prepped and draped in usual sterile fashion.  Timeout was done.  Subumbilical incision was made.  Dissection was carried out.  Hernia contents were  from the umbilical skin.  Hernia contents were primarily preperitoneal fat this was reduced.  Preperitoneal space was created.  Hemostasis was confirmed.  Sponge needle instrument count was found to be correct.  Hernia defect was less than 3 cm.  I decided to proceed with repair using a small Ventralex ST mesh.  Mesh was inserted into the preperitoneal space and secured in several different locations using interrupted Ethibond sutures.  Satisfactory tension-free repair was achieved.  Umbilical skin was tacked down to the fascia using Ethibond suture.  Hemostasis was confirmed.  Sponge needle instrument count was found to be correct.  Satisfactory tension-free repair was accomplished.  After confirming hemostasis sponge needle instrument count wound was approximated using absorbable sutures.  Local anesthetic was infiltrated.  Dermabond was applied.  Steri-Strips were applied.  Sterile dressing was applied.  Patient tolerated procedure well and was transferred to the recovery room in a stable condition.    Recommendations: Findings discussed with the patient's family at length.

## 2024-07-17 NOTE — H&P
HISTORY and PHYSICAL  [facility]       NAME:  Hyun Najera  MRN: [id number]   YOB: 1955   Date: 7/17/2024   Age: 68 y.o.  Gender: female       COMPLAINT AND PRESENT HISTORY:       Hyun Najera is 68 y.o.,  female, is here for : Pre-Op Diagnosis Codes:     * Umbilical hernia without obstruction and without gangrene     Patient will be having:  Procedure(s):  OPEN HERNIA UMBILICAL REPAIR  WITH  MESH    Patient reports some  symptoms of : bulging out and discomfort.   Patient noticed the Hernia 2 years ago.     Patient reports that the Hernia grew in size. The Hernia is reported in general as  tender more expansile but can be reducible    Pt reports  more relieved with hernia when: lying down, standing , bending over, walking aggravates hernia more.  Pt reports that Hernia is  felt more with coughing, sneezing or straining.      Pt denies any constipation or bowel blockage.  Pt denies any previous hernia surgeries.  Pt admits to never having an  Colonoscopy done before. .   .     SIGNIFICANT MEDICAL HISTORY reviewed.     Pt denies any  chest pain/pressure.   No recent URI,.  Pt denies any SOB, fever or chills.     Patient has been NPO since midnight. No blood thinners.   Patient denies any personal or family hx of problems with  Anesthesia.      IMAGING    CT CHEST ABDOMEN PELVIS W CONTRAST  3/12/24  IMPRESSION:  1. No evidence of acute traumatic injury of the chest, abdomen, pelvis,  thoracic spine or lumbar spine.  2. Right lower lobe solid pulmonary nodule measures up to 1.4 cm. Per  Fleischner Society Guidelines, recommend a non-contrast Chest CT at 3-6  months. Subsequent management based on the most suspicious nodule(s).  3. Indeterminate right adrenal nodule measuring up to 3.3 cm.  CT washout  [place] versus MRI may be considered for further evaluation.  4. Thickened endometrium measuring up to 1 cm in maximum thickness.  Please  correlate with tissue sampling.  5. 
normal...

## 2024-07-30 ENCOUNTER — OFFICE VISIT (OUTPATIENT)
Dept: OBGYN CLINIC | Age: 69
End: 2024-07-30
Payer: MEDICARE

## 2024-07-30 VITALS
WEIGHT: 210 LBS | HEART RATE: 80 BPM | DIASTOLIC BLOOD PRESSURE: 76 MMHG | SYSTOLIC BLOOD PRESSURE: 118 MMHG | BODY MASS INDEX: 41.23 KG/M2 | RESPIRATION RATE: 18 BRPM | HEIGHT: 60 IN

## 2024-07-30 DIAGNOSIS — R93.89 ENDOMETRIAL THICKENING ON ULTRASOUND: ICD-10-CM

## 2024-07-30 DIAGNOSIS — R93.89 ABNORMAL FINDING ON IMAGING: Primary | ICD-10-CM

## 2024-07-30 PROCEDURE — 1036F TOBACCO NON-USER: CPT | Performed by: OBSTETRICS & GYNECOLOGY

## 2024-07-30 PROCEDURE — G8417 CALC BMI ABV UP PARAM F/U: HCPCS | Performed by: OBSTETRICS & GYNECOLOGY

## 2024-07-30 PROCEDURE — 1123F ACP DISCUSS/DSCN MKR DOCD: CPT | Performed by: OBSTETRICS & GYNECOLOGY

## 2024-07-30 PROCEDURE — 3017F COLORECTAL CA SCREEN DOC REV: CPT | Performed by: OBSTETRICS & GYNECOLOGY

## 2024-07-30 PROCEDURE — G8427 DOCREV CUR MEDS BY ELIG CLIN: HCPCS | Performed by: OBSTETRICS & GYNECOLOGY

## 2024-07-30 PROCEDURE — 99213 OFFICE O/P EST LOW 20 MIN: CPT | Performed by: OBSTETRICS & GYNECOLOGY

## 2024-07-30 PROCEDURE — 1090F PRES/ABSN URINE INCON ASSESS: CPT | Performed by: OBSTETRICS & GYNECOLOGY

## 2024-07-30 PROCEDURE — G8399 PT W/DXA RESULTS DOCUMENT: HCPCS | Performed by: OBSTETRICS & GYNECOLOGY

## 2024-07-30 NOTE — PROGRESS NOTES
Hyun Najera  2024      Hyun Najera is a 68 y.o. female       The patient was seen today. She was here to follow-up regarding her labs and diagnostics ordered at her last visit for the diagnosis of:    ICD-10-CM    1. Abnormal finding on imaging-CT Thickened endometrium  R93.89       2. Endometrial thickening on ultrasound  R93.89            Her bowels are regular and she is voiding without difficulty. Denies hx PMB or PCB. Had CT and Sono c/w thickened endometrium. Surgery by Dr. Grewal for herniorrhaphy ~2 weeks ago. Pt is scheduled for D&C  hscope myosure 2024. PAP reviewed and normal.  Procedure RB reviewed.       Past Medical History:   Diagnosis Date    Adrenal nodule (HCC) 2024    3.3 cm right CT scan 3/12/24    Asthma 10/07/2013    Cholelithiasis     Chronic rhinosinusitis 10/07/2013    Family history of diabetes mellitus (DM) 10/07/2013    Former smoker 10/07/2013    Full dentures     upper and lower    Inguinal hernia 2024    CT scan 3/12/24 fat containing    Pulmonary nodule 2024    1.4 cm on CT scan 3/12/24    Umbilical hernia     CT scan 3/12/24- fat containing         Past Surgical History:   Procedure Laterality Date    CHOLECYSTECTOMY N/A 4/3/2024    CHOLECYSTECTOMY LAPAROSCOPIC ROBOTIC XI performed by Oswaldo Grewal MD at [facility] OR    EYE SURGERY Bilateral     cataracts    TONSILLECTOMY  1983    UMBILICAL HERNIA REPAIR N/A 2024    OPEN HERNIA UMBILICAL REPAIR  WITH  MESH performed by Oswaldo Grewal MD at [facility] OR         Family History   Problem Relation Age of Onset    Diabetes Mother     High Blood Pressure Mother     Diabetes Father     High Blood Pressure Sister     Diabetes Sister     High Blood Pressure Brother     Stroke Brother     Diabetes Brother          Social History     Tobacco Use    Smoking status: Former     Current packs/day: 0.00     Average packs/day: 0.6 packs/day for 7.0 years (4.2 ttl pk-yrs)     Types: Cigarettes     Start

## 2024-07-31 ENCOUNTER — OFFICE VISIT (OUTPATIENT)
Age: 69
End: 2024-07-31

## 2024-07-31 VITALS
SYSTOLIC BLOOD PRESSURE: 138 MMHG | HEIGHT: 60 IN | WEIGHT: 215 LBS | HEART RATE: 79 BPM | DIASTOLIC BLOOD PRESSURE: 68 MMHG | BODY MASS INDEX: 42.21 KG/M2

## 2024-07-31 DIAGNOSIS — Z98.890 STATUS POST SURGERY: Primary | ICD-10-CM

## 2024-07-31 NOTE — PATIENT INSTRUCTIONS
No lifting more then 10lbs through the month of August.   Colonoscopy due in August or september of this year.  Our surgery schedulers will contact you to schedule,If you do not hear from our office in 1 week please call us at [phone]          [facility]  Oswaldo Grewal MD, FACS  Nara Cunningham, APRN-CNP  [address] [place] [address], [address]  [place] [address]  Phone: [phone]  Fax: [phone]      Miralax (Polyethylene Glycol)  STOP Aspirin 7 Days prior to Procedure  STOP all other Blood Thinners 5 Days prior to Procedure      **DO NOT EAT ANY SOLID FOOD THE DAY BEFORE YOUR PROCEDURE**  **YOU MUST BE ON A CLEAR LIQUID DIET ONLY**    Approved Clear Liquids:  Any flavor of water or soda except Red or Purple  Fruit Juice without pulp  Coffee or tea without dairy products  Jell-O without fruit or toppings, No Red or Purple  Pop-xavier or Italian Ice, No Red or Purple  Chicken or Beef broth and bouillon      DRINK PLENTY OF WATER THROUGHOUT THE DAY    At 10:00 am the day before your procedure, take all 4 Dulcolax Tablets.  At 6:00 pm the day before your procedure, mix the ENTIRE bottle of Miralax (238 gram or Close to it) with 64 ounces of Gatorade, Gatorade Zero or Propel Water (NOT RED or PURPLE).  You must consume the entire 64 ounces by 8:00 pm.  Continue clear liquid diet up until midnight.    NOTHING TO EAT, DRINK, SMOKE, OR CHEW AFTER MIDNIGHT    You may brush your teeth, rinse, gargle, and spit.  Heart or Blood pressure medications ONLY with a small sip of water, unless otherwise directed.  Shower with regular soap and water.    YOU MUST HAVE AN ADULT EITHER DRIVE YOU OR RIDE IN A CAB WITH YOU            [facility] SURG[facility]   [address] [place] [address] [address]  [facility] [address]  Dept: [phone]  Dept Fax: [phone]          SURGICAL REQUIREMENTS :    - If you have any coverage / billing questions before

## 2024-08-03 NOTE — PROGRESS NOTES
[facility] General Surgery   Oswaldo Grewal MD, FACS  Nara MJinny Octavio, APRN-CNP  [address], [address]  [place] [address]  P: [phone], F: [phone]    General and Robotic Surgery  Post-Op Visit Note               PATIENT NAME: Hyun Najera   :  1955   MRN: [id number]   PCP:  Irwin Sánchez MD     TODAY'S DATE: 8/3/2024    Chief Complaint   Patient presents with    Post-Op Check     OPEN HERNIA UMBILICAL REPAIR  WITH  MESH          HISTORY OF PRESENT ILLNESS: 68 y.o. female status post open incarcerated umbilical hernia repair with mesh 2024.  Patient continues to recover well.  Status post robotic cholecystectomy back in April.  Patient is tolerating diet bowels are moving.  No significant pain.  No redness.  No drainage.  Patient informed me that she had a positive fit test and will need a colonoscopy.  Patient is scheduled for GYN procedure in August/&    PAST MEDICAL HISTORY     Past Medical History:   Diagnosis Date    Adrenal nodule (HCC) 2024    3.3 cm right CT scan 3/12/24    Asthma 10/07/2013    Cholelithiasis     Chronic rhinosinusitis 10/07/2013    Family history of diabetes mellitus (DM) 10/07/2013    Former smoker 10/07/2013    Full dentures     upper and lower    Inguinal hernia 2024    CT scan 3/12/24 fat containing    Pulmonary nodule 2024    1.4 cm on CT scan 3/12/24    Umbilical hernia     CT scan 3/12/24- fat containing       PROBLEM LIST     Patient Active Problem List   Diagnosis    Allergic rhinitis    Asthma    Family history of diabetes mellitus (DM)    Chronic rhinosinusitis    Former smoker    Enthesopathy of ankle and tarsus    Exostosis    Plantar fasciitis of right foot    Sprain of left lower leg    Pain of left lower extremity    Morbid obesity with BMI of 40.0-44.9, adult (HCC)    IFG (impaired fasting glucose)    Prediabetes    Mixed hyperlipidemia    Personal history of COVID-19       SURGICAL HISTORY

## 2024-08-13 ENCOUNTER — OFFICE VISIT (OUTPATIENT)
Dept: OBGYN CLINIC | Age: 69
End: 2024-08-13
Payer: MEDICARE

## 2024-08-13 ENCOUNTER — HOSPITAL ENCOUNTER (OUTPATIENT)
Dept: PREADMISSION TESTING | Age: 69
Discharge: HOME | End: 2024-08-17
Attending: OBSTETRICS & GYNECOLOGY
Payer: MEDICARE

## 2024-08-13 ENCOUNTER — PREP FOR PROCEDURE (OUTPATIENT)
Dept: OBGYN CLINIC | Age: 69
End: 2024-08-13

## 2024-08-13 VITALS
BODY MASS INDEX: 42.6 KG/M2 | HEIGHT: 60 IN | SYSTOLIC BLOOD PRESSURE: 110 MMHG | WEIGHT: 217 LBS | DIASTOLIC BLOOD PRESSURE: 62 MMHG

## 2024-08-13 VITALS
TEMPERATURE: 98.2 F | HEART RATE: 66 BPM | RESPIRATION RATE: 18 BRPM | DIASTOLIC BLOOD PRESSURE: 68 MMHG | SYSTOLIC BLOOD PRESSURE: 143 MMHG | OXYGEN SATURATION: 96 %

## 2024-08-13 DIAGNOSIS — Z01.818 PREOP TESTING: Primary | ICD-10-CM

## 2024-08-13 DIAGNOSIS — Z01.818 PREOP TESTING: ICD-10-CM

## 2024-08-13 DIAGNOSIS — R93.89 ABNORMAL FINDING ON IMAGING: Primary | ICD-10-CM

## 2024-08-13 DIAGNOSIS — R93.89 ENDOMETRIAL THICKENING ON ULTRASOUND: ICD-10-CM

## 2024-08-13 LAB
ABO + RH BLD: NORMAL
ANION GAP SERPL CALCULATED.3IONS-SCNC: 12 MMOL/L (ref 9–17)
ARM BAND NUMBER: NORMAL
BACTERIA URNS QL MICRO: ABNORMAL
BASOPHILS # BLD: 0.1 K/UL (ref 0–0.2)
BASOPHILS NFR BLD: 1 % (ref 0–2)
BILIRUB UR QL STRIP: NEGATIVE
BLOOD BANK SAMPLE EXPIRATION: NORMAL
BLOOD GROUP ANTIBODIES SERPL: NEGATIVE
BUN SERPL-MCNC: 14 MG/DL (ref 8–23)
CALCIUM SERPL-MCNC: 8.7 MG/DL (ref 8.6–10.4)
CASTS #/AREA URNS LPF: ABNORMAL /LPF
CHLORIDE SERPL-SCNC: 107 MMOL/L (ref 98–107)
CLARITY UR: CLEAR
CO2 SERPL-SCNC: 24 MMOL/L (ref 20–31)
COLOR UR: YELLOW
CREAT SERPL-MCNC: 1 MG/DL (ref 0.5–0.9)
EOSINOPHIL # BLD: 0.5 K/UL (ref 0–0.4)
EOSINOPHILS RELATIVE PERCENT: 5 % (ref 0–4)
EPI CELLS #/AREA URNS HPF: ABNORMAL /HPF
ERYTHROCYTE [DISTWIDTH] IN BLOOD BY AUTOMATED COUNT: 14.5 % (ref 11.5–14.9)
GFR, ESTIMATED: 61 ML/MIN/1.73M2
GLUCOSE SERPL-MCNC: 107 MG/DL (ref 70–99)
GLUCOSE UR STRIP-MCNC: NEGATIVE MG/DL
HCT VFR BLD AUTO: 39.1 % (ref 36–46)
HGB BLD-MCNC: 12.8 G/DL (ref 12–16)
HGB UR QL STRIP.AUTO: NEGATIVE
KETONES UR STRIP-MCNC: ABNORMAL MG/DL
LEUKOCYTE ESTERASE UR QL STRIP: ABNORMAL
LYMPHOCYTES NFR BLD: 2.8 K/UL (ref 1–4.8)
LYMPHOCYTES RELATIVE PERCENT: 31 % (ref 24–44)
MCH RBC QN AUTO: 28 PG (ref 26–34)
MCHC RBC AUTO-ENTMCNC: 32.7 G/DL (ref 31–37)
MCV RBC AUTO: 85.6 FL (ref 80–100)
MONOCYTES NFR BLD: 0.8 K/UL (ref 0.1–1.3)
MONOCYTES NFR BLD: 9 % (ref 1–7)
NEUTROPHILS NFR BLD: 54 % (ref 36–66)
NEUTS SEG NFR BLD: 4.8 K/UL (ref 1.3–9.1)
NITRITE UR QL STRIP: NEGATIVE
PH UR STRIP: 5.5 [PH] (ref 5–8)
PLATELET # BLD AUTO: 283 K/UL (ref 150–450)
PMV BLD AUTO: 7.6 FL (ref 6–12)
POTASSIUM SERPL-SCNC: 4.4 MMOL/L (ref 3.7–5.3)
PROT UR STRIP-MCNC: NEGATIVE MG/DL
RBC # BLD AUTO: 4.56 M/UL (ref 4–5.2)
RBC #/AREA URNS HPF: ABNORMAL /HPF
SODIUM SERPL-SCNC: 143 MMOL/L (ref 135–144)
SP GR UR STRIP: 1.02 (ref 1–1.03)
UROBILINOGEN UR STRIP-ACNC: NORMAL EU/DL (ref 0–1)
WBC #/AREA URNS HPF: ABNORMAL /HPF
WBC OTHER # BLD: 9 K/UL (ref 3.5–11)

## 2024-08-13 PROCEDURE — 80048 BASIC METABOLIC PNL TOTAL CA: CPT

## 2024-08-13 PROCEDURE — 86901 BLOOD TYPING SEROLOGIC RH(D): CPT

## 2024-08-13 PROCEDURE — 86850 RBC ANTIBODY SCREEN: CPT

## 2024-08-13 PROCEDURE — G8427 DOCREV CUR MEDS BY ELIG CLIN: HCPCS | Performed by: OBSTETRICS & GYNECOLOGY

## 2024-08-13 PROCEDURE — 1090F PRES/ABSN URINE INCON ASSESS: CPT | Performed by: OBSTETRICS & GYNECOLOGY

## 2024-08-13 PROCEDURE — 85025 COMPLETE CBC W/AUTO DIFF WBC: CPT

## 2024-08-13 PROCEDURE — G8399 PT W/DXA RESULTS DOCUMENT: HCPCS | Performed by: OBSTETRICS & GYNECOLOGY

## 2024-08-13 PROCEDURE — G8417 CALC BMI ABV UP PARAM F/U: HCPCS | Performed by: OBSTETRICS & GYNECOLOGY

## 2024-08-13 PROCEDURE — 81001 URINALYSIS AUTO W/SCOPE: CPT

## 2024-08-13 PROCEDURE — 99213 OFFICE O/P EST LOW 20 MIN: CPT | Performed by: OBSTETRICS & GYNECOLOGY

## 2024-08-13 PROCEDURE — 1123F ACP DISCUSS/DSCN MKR DOCD: CPT | Performed by: OBSTETRICS & GYNECOLOGY

## 2024-08-13 PROCEDURE — 1036F TOBACCO NON-USER: CPT | Performed by: OBSTETRICS & GYNECOLOGY

## 2024-08-13 PROCEDURE — 86900 BLOOD TYPING SEROLOGIC ABO: CPT

## 2024-08-13 PROCEDURE — 87086 URINE CULTURE/COLONY COUNT: CPT

## 2024-08-13 PROCEDURE — 3017F COLORECTAL CA SCREEN DOC REV: CPT | Performed by: OBSTETRICS & GYNECOLOGY

## 2024-08-13 PROCEDURE — 36415 COLL VENOUS BLD VENIPUNCTURE: CPT

## 2024-08-13 RX ORDER — SODIUM CHLORIDE 0.9 % (FLUSH) 0.9 %
5-40 SYRINGE (ML) INJECTION PRN
Status: CANCELLED | OUTPATIENT
Start: 2024-08-13

## 2024-08-13 RX ORDER — SODIUM CHLORIDE, SODIUM LACTATE, POTASSIUM CHLORIDE, CALCIUM CHLORIDE 600; 310; 30; 20 MG/100ML; MG/100ML; MG/100ML; MG/100ML
INJECTION, SOLUTION INTRAVENOUS CONTINUOUS
Status: CANCELLED | OUTPATIENT
Start: 2024-08-13

## 2024-08-13 RX ORDER — ACETAMINOPHEN 500 MG
500 TABLET ORAL EVERY 6 HOURS PRN
COMMUNITY

## 2024-08-13 RX ORDER — SODIUM CHLORIDE 9 MG/ML
INJECTION, SOLUTION INTRAVENOUS PRN
Status: CANCELLED | OUTPATIENT
Start: 2024-08-13

## 2024-08-13 RX ORDER — SODIUM CHLORIDE 0.9 % (FLUSH) 0.9 %
5-40 SYRINGE (ML) INJECTION EVERY 12 HOURS SCHEDULED
Status: CANCELLED | OUTPATIENT
Start: 2024-08-13

## 2024-08-13 NOTE — PROGRESS NOTES
[facility] Obstetrics & Gynecology  [address]; [address]  [place] [address]  [phone] mn [phone] Fax        Hyun Najera  1955  Primary Care Physician: Irwin Sánchez MD        HISTORY AND PHYSICAL      Hospital: [facility]      2024  MEDICAID CONSENT COMPLETED: No      HPI: Hyun Najera is a 68 y.o. female   she is being seen for the problems listed below and is planning surgical intervention. She was counseled on all conservative medical and surgical options as well as definitive procedures as well.    1. Abnormal finding on imaging-CT Thickened endometrium    2. Endometrial thickening on ultrasound       Denies hx PMB or PCB. Had CT and Sono c/w thickened endometrium. Surgery by Dr. Grewal for herniorrhaphy ~4 weeks ago. Pt is scheduled for D&C  hscope myosure 2024. PAP reviewed and normal.  Procedure RB reviewed.     Relevant Past History:   Patient Active Problem List    Diagnosis Date Noted    Morbid obesity with BMI of 40.0-44.9, adult ([facility]) 2022     Priority: Medium    Sprain of left lower leg 10/07/2017     Priority: Medium    Pain of left lower extremity 10/07/2017     Priority: Medium    Personal history of COVID-19 2024    IFG (impaired fasting glucose) 2023    Prediabetes 2023    Mixed hyperlipidemia 2023    Enthesopathy of ankle and tarsus 2015    Exostosis 2015    Plantar fasciitis of right foot 2015    Allergic rhinitis 10/07/2013    Asthma 10/07/2013    Family history of diabetes mellitus (DM) 10/07/2013    Chronic rhinosinusitis 10/07/2013    Former smoker 10/07/2013         OB History    Para Term  AB Living   4 3 3 0 1 3   SAB IAB Ectopic Molar Multiple Live Births   1 0 0 0 0 3      # Outcome Date GA Lbr Surya/2nd Weight Sex Type Anes PTL Lv   4 SAB            3 Term            2 Term            1 Term                Past Medical History:   Diagnosis Date    Adrenal nodule (HCC)

## 2024-08-13 NOTE — DISCHARGE INSTRUCTIONS
Pre-op Instructions For Out-Patient Surgery    Medication Instructions:  Please stop herbs and any supplements now (includes vitamins and minerals).    Please contact your surgeon and prescribing physician for pre-op instructions for any blood thinners.    If you have inhalers/aerosol treatments at home, please use them the morning of your surgery and bring the inhalers with you to the hospital.    Please take the following medications the morning of your surgery with a sip of water:    None    Surgery Instructions:  After midnight before surgery:  Do not eat or drink anything, including water, mints, gum, and hard candy.  You may brush your teeth without swallowing.  No smoking, chewing tobacco, or street drugs.    Please shower or bathe before surgery.  If you were given Surgical Scrub Chlorhexidine Gluconate Liquid (CHG), please shower the night before and the morning of your surgery following the detailed instructions you received during your pre-admission visit.     Please do not wear any cologne, lotion, powder, deodorant, jewelry, piercings, perfume, makeup, nail polish, hair accessories, or hair spray on the day of surgery.  Wear loose comfortable clothing.    Leave your valuables at home but bring a payment source for any after-surgery prescriptions you plan to fill at [facility].  Bring a storage case for any glasses/contacts.    An adult who is responsible for you MUST drive you home and should be with you for the first 24 hours after surgery.     If having out-patient knee and foot surgeries, please arrange for planned crutches, walker, or wheelchair before arriving to the hospital.    The Day of Surgery:  Arrive at [facility] Surgery Entrance at the time directed by your surgeon and check in at the desk. August 30, Arrive by 830, procedure scheduled for 1030    If you have a living will or healthcare power of , please bring a copy.    You will

## 2024-08-13 NOTE — H&P
[facility] Obstetrics & Gynecology  [address]; [address]  [place] [address]  [phone] mn [phone] Fax        Hyun Najera  1955  Primary Care Physician: Irwin Sánchez MD        HISTORY AND PHYSICAL      Hospital: [facility]      2024  MEDICAID CONSENT COMPLETED: No      HPI: Hyun Najera is a 68 y.o. female   she is being seen for the problems listed below and is planning surgical intervention. She was counseled on all conservative medical and surgical options as well as definitive procedures as well.    1. Abnormal finding on imaging-CT Thickened endometrium    2. Endometrial thickening on ultrasound       Denies hx PMB or PCB. Had CT and Sono c/w thickened endometrium. Surgery by Dr. Grewal for herniorrhaphy ~4 weeks ago. Pt is scheduled for D&C  hscope myosure 2024. PAP reviewed and normal.  Procedure RB reviewed.     Relevant Past History:   Patient Active Problem List    Diagnosis Date Noted    Morbid obesity with BMI of 40.0-44.9, adult ([facility]) 2022     Priority: Medium    Sprain of left lower leg 10/07/2017     Priority: Medium    Pain of left lower extremity 10/07/2017     Priority: Medium    Personal history of COVID-19 2024    IFG (impaired fasting glucose) 2023    Prediabetes 2023    Mixed hyperlipidemia 2023    Enthesopathy of ankle and tarsus 2015    Exostosis 2015    Plantar fasciitis of right foot 2015    Allergic rhinitis 10/07/2013    Asthma 10/07/2013    Family history of diabetes mellitus (DM) 10/07/2013    Chronic rhinosinusitis 10/07/2013    Former smoker 10/07/2013         OB History    Para Term  AB Living   4 3 3 0 1 3   SAB IAB Ectopic Molar Multiple Live Births   1 0 0 0 0 3      # Outcome Date GA Lbr Surya/2nd Weight Sex Type Anes PTL Lv   4 SAB            3 Term            2 Term            1 Term                Past Medical History:   Diagnosis Date    Adrenal nodule (HCC)

## 2024-08-13 NOTE — H&P (VIEW-ONLY)
[facility] Obstetrics & Gynecology  [address]; [address]  [place] [address]  [phone] mn [phone] Fax        Hyun Najera  1955  Primary Care Physician: Irwin Sánchez MD        HISTORY AND PHYSICAL      Hospital: [facility]      2024  MEDICAID CONSENT COMPLETED: No      HPI: Hyun Najera is a 68 y.o. female   she is being seen for the problems listed below and is planning surgical intervention. She was counseled on all conservative medical and surgical options as well as definitive procedures as well.    1. Abnormal finding on imaging-CT Thickened endometrium    2. Endometrial thickening on ultrasound       Denies hx PMB or PCB. Had CT and Sono c/w thickened endometrium. Surgery by Dr. Grewal for herniorrhaphy ~4 weeks ago. Pt is scheduled for D&C  hscope myosure 2024. PAP reviewed and normal.  Procedure RB reviewed.     Relevant Past History:   Patient Active Problem List    Diagnosis Date Noted    Morbid obesity with BMI of 40.0-44.9, adult ([facility]) 2022     Priority: Medium    Sprain of left lower leg 10/07/2017     Priority: Medium    Pain of left lower extremity 10/07/2017     Priority: Medium    Personal history of COVID-19 2024    IFG (impaired fasting glucose) 2023    Prediabetes 2023    Mixed hyperlipidemia 2023    Enthesopathy of ankle and tarsus 2015    Exostosis 2015    Plantar fasciitis of right foot 2015    Allergic rhinitis 10/07/2013    Asthma 10/07/2013    Family history of diabetes mellitus (DM) 10/07/2013    Chronic rhinosinusitis 10/07/2013    Former smoker 10/07/2013         OB History    Para Term  AB Living   4 3 3 0 1 3   SAB IAB Ectopic Molar Multiple Live Births   1 0 0 0 0 3      # Outcome Date GA Lbr Surya/2nd Weight Sex Type Anes PTL Lv   4 SAB            3 Term            2 Term            1 Term                Past Medical History:   Diagnosis Date    Adrenal nodule (HCC)  above.    The patient was counseled on the procedure. Risks and complications were reviewed in detail. The patients orders, labs, consents have been completed. The history and physical as well as all supporting surgical documentation will be forwarded to the pre-operative holding area.    The patient is aware that this procedure may not alleviate her symptoms. That there may be a necessity for a second surgery and that there may be an incomplete removal of abnormal tissue.                  ________________________________________D.O. Date:_______________  Edwardo Concepcion DO

## 2024-08-14 LAB
MICROORGANISM SPEC CULT: NORMAL
SERVICE CMNT-IMP: NORMAL
SPECIMEN DESCRIPTION: NORMAL

## 2024-08-15 ENCOUNTER — TELEPHONE (OUTPATIENT)
Age: 69
End: 2024-08-15

## 2024-08-15 ENCOUNTER — OFFICE VISIT (OUTPATIENT)
Age: 69
End: 2024-08-15

## 2024-08-15 VITALS
OXYGEN SATURATION: 95 % | HEIGHT: 60 IN | SYSTOLIC BLOOD PRESSURE: 149 MMHG | HEART RATE: 70 BPM | TEMPERATURE: 97.2 F | BODY MASS INDEX: 42.41 KG/M2 | WEIGHT: 216 LBS | DIASTOLIC BLOOD PRESSURE: 86 MMHG

## 2024-08-15 DIAGNOSIS — R19.5 POSITIVE FIT (FECAL IMMUNOCHEMICAL TEST): ICD-10-CM

## 2024-08-15 DIAGNOSIS — Z98.890 STATUS POST SURGERY: Primary | ICD-10-CM

## 2024-08-15 RX ORDER — POLYETHYLENE GLYCOL 3350 17 G/17G
POWDER, FOR SOLUTION ORAL
Qty: 238 G | Refills: 0 | Status: SHIPPED | OUTPATIENT
Start: 2024-08-15

## 2024-08-15 RX ORDER — ONDANSETRON 4 MG/1
4 TABLET, FILM COATED ORAL EVERY 6 HOURS PRN
Qty: 10 TABLET | Refills: 0 | Status: SHIPPED | OUTPATIENT
Start: 2024-08-15

## 2024-08-15 RX ORDER — BISACODYL 5 MG/1
TABLET, DELAYED RELEASE ORAL
Qty: 4 TABLET | Refills: 0 | Status: SHIPPED | OUTPATIENT
Start: 2024-08-15

## 2024-08-15 NOTE — TELEPHONE ENCOUNTER
Spoke with patient to schedule procedure. Discussed pre op and bowel prep instructions.    EM/SC/9-21-24 at 12:00pm/Diagnostic Colonoscopy/Justyna        Miralax (Polyethylene Glycol)  STOP Aspirin 7 Days prior to Procedure  STOP all other Blood Thinners 5 Days prior to Procedure      **DO NOT EAT ANY SOLID FOOD THE DAY BEFORE YOUR PROCEDURE**  **YOU MUST BE ON A CLEAR LIQUID DIET ONLY**    Approved Clear Liquids:  Any flavor of water or soda except Red or Purple  Fruit Juice without pulp  Coffee or tea without dairy products  Jell-O without fruit or toppings, No Red or Purple  Pop-xavier or Italian Ice, No Red or Purple  Chicken or Beef broth and bouillon      DRINK PLENTY OF WATER THROUGHOUT THE DAY    At 10:00 am the day before your procedure, take all 4 Dulcolax Tablets.  At 6:00 pm the day before your procedure, mix the ENTIRE bottle of Miralax (238 gram or Close to it) with 64 ounces of Gatorade, Gatorade Zero or Propel Water (NOT RED or PURPLE).  You must consume the entire 64 ounces by 8:00 pm.  Continue clear liquid diet up until midnight.    NOTHING TO EAT, DRINK, SMOKE, OR CHEW AFTER MIDNIGHT    You may brush your teeth, rinse, gargle, and spit.  Heart or Blood pressure medications ONLY with a small sip of water, unless otherwise directed.  Shower with regular soap and water.    YOU MUST HAVE AN ADULT EITHER DRIVE YOU OR RIDE IN A CAB WITH YOU        MY PROCEDURE IS SCHEDULED FOR: Diagnostic Colonoscopy    Date of Procedure: 8/21/24  Time: 12:00pm  Arrival Time: 10:00am  Location:  [facility]   Your scripts have been sent to: [facility] on [address]  Pre-Admission Testing Nurse Phone Call: 9/20/24 at 8:45am  Follow Up Appointment at [facility] General Surgery with Nara Cunningham 9/16/24 at 9:30am

## 2024-08-15 NOTE — PROGRESS NOTES
[facility] General Surgery   Oswaldo Grewal MD, FACS  Nara MJinny Octavio, APRN-CNP  [address], [address]  [place] [address]  P: [phone], F: [phone]    General and Robotic Surgery  Post-Op Visit Note               PATIENT NAME: Hyun Najera   :  1955   MRN: [id number]   PCP:  Irwin Sánchez MD     TODAY'S DATE: 8/15/2024    Chief Complaint   Patient presents with    Post-Op Check     68 y.o. female status post open incarcerated umbilical hernia repair with mesh 2024- states that there is a suture present with a slight odor where the opening is. Tenderness is present.        HISTORY OF PRESENT ILLNESS: 68 y.o. female status post open incarcerated umbilical hernia with small Ventralex ST mesh on 2024.  Patient is here for evaluation regarding possible suture spit. Patient is concerned that this may cause wound issues. Patient had open repair of an incarcerated umbilical hernia with mesh on 2024. Pt was post operatively seen without any concerns. Patient also was found to have a positive fit test and was going to be scheduled for a  colonoscopy. No blood thinners. Denies family hx of colon cancer. Patient is s/p cholecystectomy in 2024 with evidence of cholelithiasis, mild chronic inflammation, and cholesterolosis.     PAST MEDICAL HISTORY     Past Medical History:   Diagnosis Date    Adrenal nodule (HCC) 2024    3.3 cm right CT scan 3/12/24    Asthma 10/07/2013    Cholelithiasis     Chronic rhinosinusitis 10/07/2013    Family history of diabetes mellitus (DM) 10/07/2013    Former smoker 10/07/2013    Full dentures     upper and lower    Inguinal hernia 2024    CT scan 3/12/24 fat containing    Pulmonary nodule 2024    1.4 cm on CT scan 3/12/24    Umbilical hernia     CT scan 3/12/24- fat containing       PROBLEM LIST     Patient Active Problem List   Diagnosis    Allergic rhinitis    Asthma    Family history of diabetes mellitus

## 2024-08-19 NOTE — PRE-PROCEDURE INSTRUCTIONS
Have you received your Prep?yes Any questions with prep instructions?no  Only Clear Liquid Diet day before.yes  Nothing to eat after midnight day before procedure.yes  Are you taking any blood thinners? If so, you need to Stop.no  Remove any jewelry and body piercings.yes  Do you wear glasses? If so, please bring a case to store them in.  Are you having any Covid symptoms?no  Do you have any new rashes, infections, etc. that we should be aware of?no  Do you have a ride home the day of surgery? It cannot be a cab or medical transportation.yes  Verify surgery time/date and what time to arrive at hospital. 1000/1200

## 2024-08-20 ENCOUNTER — ANESTHESIA EVENT (OUTPATIENT)
Dept: OPERATING ROOM | Age: 69
End: 2024-08-20
Payer: MEDICARE

## 2024-08-20 ENCOUNTER — HOSPITAL ENCOUNTER (OUTPATIENT)
Dept: PREADMISSION TESTING | Age: 69
Setting detail: OUTPATIENT SURGERY
Discharge: HOME | End: 2024-08-24
Payer: MEDICARE

## 2024-08-20 VITALS — BODY MASS INDEX: 42.41 KG/M2 | WEIGHT: 216 LBS | HEIGHT: 60 IN

## 2024-08-20 NOTE — PROGRESS NOTES
Pre-op Instructions For Out-Patient Endoscopy Surgery    Medication Instructions:  Please stop herbs and any supplements now (includes vitamins and minerals).    Please contact your surgeon and prescribing physician for pre-op instructions for any blood thinners.    If you have inhalers/aerosol treatments at home, please use them the morning of your surgery and bring the inhalers with you to the hospital.    Please take the following medications the morning of your surgery with a sip of water:    Inhaler    Surgery Instructions:  After midnight before surgery:  Do not eat or drink anything, including water, mints, gum, and hard candy.  You may brush your teeth without swallowing.  No smoking, chewing tobacco, or street drugs.    Please shower or bathe before surgery.       Please do not wear any cologne, lotion, powder, jewelry, piercings, perfume, makeup, nail polish, hair accessories, or hair spray on the day of surgery.  Wear loose comfortable clothing.    Leave your valuables at home but bring a payment source for any after-surgery prescriptions you plan to fill at [facility].  Bring a storage case for any glasses/contacts.    An adult who is responsible for you MUST drive you home and should be with you for the first 24 hours after surgery.     The Day of Surgery:  Arrive at [facility] Surgery Entrance at the time directed by your surgeon and check in at the desk.     If you have a living will or healthcare power of , please bring a copy.    You will be taken to the pre-op holding area where you will be prepared for surgery.  A physical assessment will be performed by a nurse practitioner or house officer.  Your IV will be started and you will meet your anesthesiologist.    When you go to surgery, your family will be directed to the surgical waiting room, where the doctor should speak with them after your surgery.    After surgery, you will be taken to the recovery area.  When

## 2024-08-21 ENCOUNTER — ANESTHESIA (OUTPATIENT)
Dept: OPERATING ROOM | Age: 69
End: 2024-08-21
Payer: MEDICARE

## 2024-08-21 ENCOUNTER — HOSPITAL ENCOUNTER (OUTPATIENT)
Age: 69
Setting detail: OUTPATIENT SURGERY
Discharge: HOME | End: 2024-08-21
Attending: SURGERY | Admitting: SURGERY
Payer: MEDICARE

## 2024-08-21 VITALS
BODY MASS INDEX: 42.41 KG/M2 | DIASTOLIC BLOOD PRESSURE: 65 MMHG | SYSTOLIC BLOOD PRESSURE: 132 MMHG | HEIGHT: 60 IN | OXYGEN SATURATION: 95 % | HEART RATE: 96 BPM | RESPIRATION RATE: 18 BRPM | TEMPERATURE: 96.9 F | WEIGHT: 216 LBS

## 2024-08-21 DIAGNOSIS — R19.5 POSITIVE FIT (FECAL IMMUNOCHEMICAL TEST): ICD-10-CM

## 2024-08-21 PROCEDURE — 7100000030 HC ASPR PHASE II RECOVERY - FIRST 15 MIN: Performed by: SURGERY

## 2024-08-21 PROCEDURE — 3700000000 HC ANESTHESIA ATTENDED CARE: Performed by: SURGERY

## 2024-08-21 PROCEDURE — 45384 COLONOSCOPY W/LESION REMOVAL: CPT | Performed by: SURGERY

## 2024-08-21 PROCEDURE — 2580000003 HC RX 258: Performed by: ANESTHESIOLOGY

## 2024-08-21 PROCEDURE — 7100000001 HC PACU RECOVERY - ADDTL 15 MIN: Performed by: SURGERY

## 2024-08-21 PROCEDURE — 45380 COLONOSCOPY AND BIOPSY: CPT | Performed by: SURGERY

## 2024-08-21 PROCEDURE — 3609010300 HC COLONOSCOPY W/BIOPSY SINGLE/MULTIPLE: Performed by: SURGERY

## 2024-08-21 PROCEDURE — 7100000011 HC PHASE II RECOVERY - ADDTL 15 MIN: Performed by: SURGERY

## 2024-08-21 PROCEDURE — 6360000002 HC RX W HCPCS: Performed by: NURSE ANESTHETIST, CERTIFIED REGISTERED

## 2024-08-21 PROCEDURE — C1889 IMPLANT/INSERT DEVICE, NOC: HCPCS | Performed by: SURGERY

## 2024-08-21 PROCEDURE — 2500000003 HC RX 250 WO HCPCS: Performed by: NURSE ANESTHETIST, CERTIFIED REGISTERED

## 2024-08-21 PROCEDURE — 7100000031 HC ASPR PHASE II RECOVERY - ADDTL 15 MIN: Performed by: SURGERY

## 2024-08-21 PROCEDURE — 45385 COLONOSCOPY W/LESION REMOVAL: CPT | Performed by: SURGERY

## 2024-08-21 PROCEDURE — 7100000010 HC PHASE II RECOVERY - FIRST 15 MIN: Performed by: SURGERY

## 2024-08-21 PROCEDURE — 7100000000 HC PACU RECOVERY - FIRST 15 MIN: Performed by: SURGERY

## 2024-08-21 PROCEDURE — 3700000001 HC ADD 15 MINUTES (ANESTHESIA): Performed by: SURGERY

## 2024-08-21 PROCEDURE — 88305 TISSUE EXAM BY PATHOLOGIST: CPT

## 2024-08-21 PROCEDURE — 2709999900 HC NON-CHARGEABLE SUPPLY: Performed by: SURGERY

## 2024-08-21 DEVICE — WORKING LENGTH 235CM, WORKING CHANNEL 2.8MM
Type: IMPLANTABLE DEVICE | Site: ASCENDING COLON | Status: FUNCTIONAL
Brand: RESOLUTION 360 CLIP

## 2024-08-21 RX ORDER — FENTANYL CITRATE 50 UG/ML
INJECTION, SOLUTION INTRAMUSCULAR; INTRAVENOUS PRN
Status: DISCONTINUED | OUTPATIENT
Start: 2024-08-21 | End: 2024-08-21 | Stop reason: SDUPTHER

## 2024-08-21 RX ORDER — DEXAMETHASONE SODIUM PHOSPHATE 4 MG/ML
INJECTION, SOLUTION INTRA-ARTICULAR; INTRALESIONAL; INTRAMUSCULAR; INTRAVENOUS; SOFT TISSUE PRN
Status: DISCONTINUED | OUTPATIENT
Start: 2024-08-21 | End: 2024-08-21 | Stop reason: SDUPTHER

## 2024-08-21 RX ORDER — SODIUM CHLORIDE, SODIUM LACTATE, POTASSIUM CHLORIDE, CALCIUM CHLORIDE 600; 310; 30; 20 MG/100ML; MG/100ML; MG/100ML; MG/100ML
INJECTION, SOLUTION INTRAVENOUS CONTINUOUS
Status: DISCONTINUED | OUTPATIENT
Start: 2024-08-21 | End: 2024-08-21 | Stop reason: HOSPADM

## 2024-08-21 RX ORDER — LIDOCAINE HYDROCHLORIDE 10 MG/ML
1 INJECTION, SOLUTION EPIDURAL; INFILTRATION; INTRACAUDAL; PERINEURAL
Status: DISCONTINUED | OUTPATIENT
Start: 2024-08-21 | End: 2024-08-21 | Stop reason: HOSPADM

## 2024-08-21 RX ORDER — SODIUM CHLORIDE 0.9 % (FLUSH) 0.9 %
5-40 SYRINGE (ML) INJECTION PRN
Status: DISCONTINUED | OUTPATIENT
Start: 2024-08-21 | End: 2024-08-21 | Stop reason: HOSPADM

## 2024-08-21 RX ORDER — PROPOFOL 10 MG/ML
INJECTION, EMULSION INTRAVENOUS PRN
Status: DISCONTINUED | OUTPATIENT
Start: 2024-08-21 | End: 2024-08-21 | Stop reason: SDUPTHER

## 2024-08-21 RX ORDER — LIDOCAINE HYDROCHLORIDE 10 MG/ML
INJECTION, SOLUTION EPIDURAL; INFILTRATION; INTRACAUDAL; PERINEURAL PRN
Status: DISCONTINUED | OUTPATIENT
Start: 2024-08-21 | End: 2024-08-21 | Stop reason: SDUPTHER

## 2024-08-21 RX ORDER — SODIUM CHLORIDE 0.9 % (FLUSH) 0.9 %
5-40 SYRINGE (ML) INJECTION EVERY 12 HOURS SCHEDULED
Status: DISCONTINUED | OUTPATIENT
Start: 2024-08-21 | End: 2024-08-21 | Stop reason: HOSPADM

## 2024-08-21 RX ORDER — SODIUM CHLORIDE 9 MG/ML
INJECTION, SOLUTION INTRAVENOUS PRN
Status: DISCONTINUED | OUTPATIENT
Start: 2024-08-21 | End: 2024-08-21 | Stop reason: HOSPADM

## 2024-08-21 RX ORDER — EPHEDRINE SULFATE/0.9% NACL/PF 50 MG/5 ML
SYRINGE (ML) INTRAVENOUS PRN
Status: DISCONTINUED | OUTPATIENT
Start: 2024-08-21 | End: 2024-08-21 | Stop reason: SDUPTHER

## 2024-08-21 RX ORDER — ONDANSETRON 2 MG/ML
INJECTION INTRAMUSCULAR; INTRAVENOUS PRN
Status: DISCONTINUED | OUTPATIENT
Start: 2024-08-21 | End: 2024-08-21 | Stop reason: SDUPTHER

## 2024-08-21 RX ADMIN — FENTANYL CITRATE 50 MCG: 50 INJECTION INTRAMUSCULAR; INTRAVENOUS at 13:13

## 2024-08-21 RX ADMIN — PROPOFOL 100 MG: 10 INJECTION, EMULSION INTRAVENOUS at 13:00

## 2024-08-21 RX ADMIN — SODIUM CHLORIDE, POTASSIUM CHLORIDE, SODIUM LACTATE AND CALCIUM CHLORIDE: 600; 310; 30; 20 INJECTION, SOLUTION INTRAVENOUS at 10:53

## 2024-08-21 RX ADMIN — Medication 5 MG: at 13:24

## 2024-08-21 RX ADMIN — Medication 10 MG: at 13:12

## 2024-08-21 RX ADMIN — DEXAMETHASONE SODIUM PHOSPHATE 4 MG: 4 INJECTION INTRA-ARTICULAR; INTRALESIONAL; INTRAMUSCULAR; INTRAVENOUS; SOFT TISSUE at 13:12

## 2024-08-21 RX ADMIN — FENTANYL CITRATE 25 MCG: 50 INJECTION INTRAMUSCULAR; INTRAVENOUS at 13:04

## 2024-08-21 RX ADMIN — ONDANSETRON 4 MG: 2 INJECTION INTRAMUSCULAR; INTRAVENOUS at 13:12

## 2024-08-21 RX ADMIN — Medication 10 MG: at 13:20

## 2024-08-21 RX ADMIN — FENTANYL CITRATE 25 MCG: 50 INJECTION INTRAMUSCULAR; INTRAVENOUS at 13:08

## 2024-08-21 RX ADMIN — LIDOCAINE HYDROCHLORIDE 50 MG: 10 INJECTION, SOLUTION EPIDURAL; INFILTRATION; INTRACAUDAL; PERINEURAL at 13:00

## 2024-08-21 ASSESSMENT — ENCOUNTER SYMPTOMS
ABDOMINAL PAIN: 0
SINUS PRESSURE: 0
SHORTNESS OF BREATH: 0
NAUSEA: 0

## 2024-08-21 ASSESSMENT — PAIN - FUNCTIONAL ASSESSMENT
PAIN_FUNCTIONAL_ASSESSMENT: NONE - DENIES PAIN
PAIN_FUNCTIONAL_ASSESSMENT: NONE - DENIES PAIN

## 2024-08-21 NOTE — ANESTHESIA PRE PROCEDURE
Irwin Sánchez MD [facility] Does not apply route Dx: COPD     Valid for 6 months 8/12/22   Maribell Parra, APRN - NP       Current medications:    Current Facility-Administered Medications   Medication Dose Route Frequency Provider Last Rate Last Admin    lidocaine PF 1 % injection 1 mL  1 mL IntraDERmal Once PRN Krissy Marie MD        lactated ringers IV soln infusion   IntraVENous Continuous Krissy Marie  mL/hr at 08/21/24 1053 New Bag at 08/21/24 1053    sodium chloride flush 0.9 % injection 5-40 mL  5-40 mL IntraVENous 2 times per day Krissy Marie MD        sodium chloride flush 0.9 % injection 5-40 mL  5-40 mL IntraVENous PRN Krissy Marie MD        0.9 % sodium chloride infusion   IntraVENous PRN Krissy Marie MD           Allergies:    Allergies   Allergen Reactions    Latex Dermatitis    Aleve [Naproxen Sodium] Other (See Comments)     Asthma attack    Adhesive Tape      Tears skin when removed    Asa [Aspirin]        Problem List:    Patient Active Problem List   Diagnosis Code    Allergic rhinitis J30.9    Asthma J45.909    Family history of diabetes mellitus (DM) Z83.3    Chronic rhinosinusitis J32.9    Former smoker Z87.891    Enthesopathy of ankle and tarsus M77.50    Exostosis M89.8X9    Plantar fasciitis of right foot M72.2    Sprain of left lower leg S83.92XA    Pain of left lower extremity M79.605    Morbid obesity with BMI of 40.0-44.9, adult (HCC) E66.01, Z68.41    IFG (impaired fasting glucose) R73.01    Prediabetes R73.03    Mixed hyperlipidemia E78.2    Personal history of COVID-19 Z86.16       Past Medical History:        Diagnosis Date    Adrenal nodule (HCC) 03/12/2024    3.3 cm right CT scan 3/12/24    Asthma 10/07/2013    Cholelithiasis     Chronic rhinosinusitis 10/07/2013    Family history of diabetes mellitus (DM) 10/07/2013    Former smoker 10/07/2013    Full dentures     upper and lower    Inguinal hernia 03/12/2024    CT scan 3/12/24 fat containing

## 2024-08-21 NOTE — OP NOTE
[facility] General Surgery   Oswaldo Grewal MD, FACS  Nara Cunningham, APRN-CNP  [address], [address]  [place] [address]  P: [phone], F: [phone]    PROCEDURE NOTE    DATE OF PROCEDURE: 8/21/2024    SURGEON: Oswaldo Grewal MD    ASSISTANT: None    PREOPERATIVE DIAGNOSIS: Positive fit test    POSTOPERATIVE DIAGNOSIS: Rectosigmoid polyp.  Sigmoid diverticulosis.  Sigmoid polyps.  Ascending colon polyp.  Flat serrated polyp cecum multiple biopsies obtained    OPERATION: Total colonoscopy to cecum with intubation of terminal ileum.  Rectosigmoid polypectomy with hot biopsy forceps.  Sigmoid polypectomies with hot snare polypectomy technique and large cold biopsy forceps with resolution clip application.  Ascending colon polypectomy with large cold biopsy forceps.  Multiple biopsies serrated adenomatous type polyp very flat in the cecum    ANESTHESIA: General    ESTIMATED BLOOD LOSS: None    COMPLICATIONS: None     SPECIMENS:  Was Obtained: Rectosigmoid polyp.  Sigmoid polyps.  Ascending colon polyp.  Biopsies flat serrated type polyp cecum    HISTORY: The patient is a 68 y.o. year old female with history of above preop diagnosis.  I recommended colonoscopy with possible biopsy or polypectomy and I explained the risk, benefits, expected outcome, and alternatives to the procedure.  Risks included but are not limited to bleeding, infection, respiratory distress, hypotension, and perforation of the colon and possibility of missing a lesion.  The patient understands and is in agreement.      PROCEDURE: The patient was given IV conscious sedation.  The patient's SPO2 remained above 90% throughout the procedure. Digital rectal exam was normal.  The colonoscope was inserted through the anus into the rectum and advanced under direct vision to the cecum without difficulty.  Terminal ileum was examined for approximately 2 inches.  The prep was good.      Findings:  Terminal ileum:

## 2024-08-21 NOTE — H&P
HISTORY and PHYSICAL  [facility]       NAME:  Hyun Najera  MRN: [id number]   YOB: 1955   Date: 8/21/2024   Age: 68 y.o.  Gender: female       COMPLAINT AND PRESENT HISTORY:       Hyun Najera is 68 y.o.,   female, having a Diagnostic Colonoscopy, for hx of: Pre-Op Diagnosis Codes:      * Positive FIT (fecal immunochemical test)      No prior Colonoscopy was done before, this  is her first.      Denies abdominal pain including bloating/gas.    No changes in bowel habits.    No diarrhea, constipation, blood in stools, black tarry stools.    No changes in appetite and unintended weight loss. Denies any nausea or vomiting.   No  heartburn, indigestion or acid reflux.     Pt denies Family Hx of colon cancer.    Medical history reviewed:   Patient voices feeling well today. Denies any recent fever or chills, chest pain/pressure.  Pt reports no  SOB.     Patient has been NPO since midnight. No blood thinners in the past  5-7 days. Pt used her inhalers including Advair  this am    Patient states  has completed and followed prescribed prep with clear outcome.      Patient denies any personal or family problems with anesthesia.      RECENT LABS, IMAGING AND TESTING     Lab Results   Component Value Date    WBC 9.0 08/13/2024    RBC 4.56 08/13/2024    HGB 12.8 08/13/2024    HCT 39.1 08/13/2024    MCV 85.6 08/13/2024    MCH 28.0 08/13/2024    MCHC 32.7 08/13/2024    RDW 14.5 08/13/2024     08/13/2024    MPV 7.6 08/13/2024        Lab Results   Component Value Date     08/13/2024    K 4.4 08/13/2024     08/13/2024    CO2 24 08/13/2024    BUN 14 08/13/2024    CREATININE 1.0 (H) 08/13/2024    GLUCOSE 107 (H) 08/13/2024    CALCIUM 8.7 08/13/2024    BILITOT 0.4 02/24/2024    ALKPHOS 126 02/24/2024    AST 13 02/24/2024    ALT 17 02/24/2024         PAST MEDICAL HISTORY     Past Medical History:   Diagnosis Date    Adrenal nodule (HCC) 03/12/2024    3.3 cm right CT scan 3/12/24     Negative for leg swelling.   Gastrointestinal:  Negative for abdominal pain and nausea.        See HPI   Skin: Negative.    Neurological:  Negative for dizziness.   Psychiatric/Behavioral:  Negative for sleep disturbance.    All other systems reviewed and are negative.        GENERAL PHYSICAL EXAM     Vitals:   Review vitals per RN flowsheet.                              Physical Exam  Constitutional:       General: She is not in acute distress.  HENT:      Head: Normocephalic.      Right Ear: External ear normal.      Left Ear: External ear normal.      Nose: Nose normal.      Mouth/Throat:      Pharynx: No posterior oropharyngeal erythema.      Comments: Pt is edentulous  Eyes:      General:         Right eye: No discharge.         Left eye: No discharge.      Comments: glasses   Cardiovascular:      Rate and Rhythm: Normal rate and regular rhythm.      Heart sounds: Normal heart sounds.   Pulmonary:      Breath sounds: Normal breath sounds.   Abdominal:      General: Bowel sounds are normal.      Tenderness: There is no abdominal tenderness. There is no guarding.   Neurological:      Mental Status: She is alert and oriented to person, place, and time.   Psychiatric:         Mood and Affect: Mood normal.        PROVISIONAL DIAGNOSES / SURGERY:        COLONOSCOPY DIAGNOSTIC    Pre-Op Diagnosis Codes:      * Positive FIT (fecal immunochemical test) [R19.5]     Patient Active Problem List    Diagnosis Date Noted    Morbid obesity with BMI of 40.0-44.9, adult (HCC) 08/14/2022    Sprain of left lower leg 10/07/2017    Pain of left lower extremity 10/07/2017    Personal history of COVID-19 03/18/2024    IFG (impaired fasting glucose) 08/22/2023    Prediabetes 08/22/2023    Mixed hyperlipidemia 08/22/2023    Enthesopathy of ankle and tarsus 09/23/2015    Exostosis 09/23/2015    Plantar fasciitis of right foot 09/23/2015    Allergic rhinitis 10/07/2013    Asthma 10/07/2013    Family history of diabetes mellitus (DM)  10/07/2013    Chronic rhinosinusitis 10/07/2013    Former smoker 10/07/2013           LEONARD NUNO, APRN - CNP on 8/21/2024 at 10:28 AM

## 2024-08-21 NOTE — ANESTHESIA POSTPROCEDURE EVALUATION
Department of Anesthesiology  Postprocedure Note    Patient: Hyun Najera  MRN: [id number]  YOB: 1955  Date of evaluation: 8/21/2024    Procedure Summary       Date: 08/21/24 Room / Location: [address]    Anesthesia Start: 1255 Anesthesia Stop: 1355    Procedure: COLONOSCOPY POLYPECTOMY HOT BIOPSY/SNARE WITH RESOLUTION CLIP APPLICATION Diagnosis:       Positive FIT (fecal immunochemical test)      (Positive FIT (fecal immunochemical test) [R19.5])    Surgeons: Oswaldo Grewal MD Responsible Provider: Karolyn Greer MD    Anesthesia Type: general ASA Status: 3            Anesthesia Type: No value filed.    Don Phase I: Don Score: 10    Don Phase II:      Anesthesia Post Evaluation    Comments: POST- ANESTHESIA EVALUATION       Pt Name: Hyun Najera  MRN: [id number]  YOB: 1955  Date of evaluation: 8/21/2024  Time:  2:55 PM      /63   Pulse 100   Temp 97.7 °F (36.5 °C)   Resp 10   Ht 1.524 m (5')   Wt 98 kg (216 lb)   SpO2 94%   BMI 42.18 kg/m²      Consciousness Level  Awake  Cardiopulmonary Status  Stable  Pain Adequately Treated YES  Nausea / Vomiting  NO  Adequate Hydration  YES  Anesthesia Related Complications NONE      Electronically signed by Karolyn Greer MD on 8/21/2024 at 2:55 PM           No notable events documented.

## 2024-08-23 ENCOUNTER — HOSPITAL ENCOUNTER (EMERGENCY)
Age: 69
Discharge: HOME | End: 2024-08-23
Attending: EMERGENCY MEDICINE
Payer: MEDICARE

## 2024-08-23 ENCOUNTER — TELEPHONE (OUTPATIENT)
Age: 69
End: 2024-08-23

## 2024-08-23 VITALS
WEIGHT: 216 LBS | SYSTOLIC BLOOD PRESSURE: 145 MMHG | HEIGHT: 60 IN | DIASTOLIC BLOOD PRESSURE: 80 MMHG | BODY MASS INDEX: 42.41 KG/M2 | HEART RATE: 56 BPM | OXYGEN SATURATION: 95 % | RESPIRATION RATE: 16 BRPM | TEMPERATURE: 97.8 F

## 2024-08-23 DIAGNOSIS — M79.10 MUSCLE PAIN: Primary | ICD-10-CM

## 2024-08-23 LAB
ALBUMIN SERPL-MCNC: 3.8 G/DL (ref 3.5–5.2)
ALP SERPL-CCNC: 128 U/L (ref 35–104)
ALT SERPL-CCNC: 19 U/L (ref 5–33)
ANION GAP SERPL CALCULATED.3IONS-SCNC: 11 MMOL/L (ref 9–17)
AST SERPL-CCNC: 28 U/L
BASOPHILS # BLD: 0.1 K/UL (ref 0–0.2)
BASOPHILS NFR BLD: 1 % (ref 0–2)
BILIRUB SERPL-MCNC: 0.3 MG/DL (ref 0.3–1.2)
BUN SERPL-MCNC: 10 MG/DL (ref 8–23)
CALCIUM SERPL-MCNC: 8.4 MG/DL (ref 8.6–10.4)
CHLORIDE SERPL-SCNC: 106 MMOL/L (ref 98–107)
CK SERPL-CCNC: 217 U/L (ref 26–192)
CO2 SERPL-SCNC: 26 MMOL/L (ref 20–31)
CREAT SERPL-MCNC: 0.9 MG/DL (ref 0.5–0.9)
EOSINOPHIL # BLD: 0.2 K/UL (ref 0–0.4)
EOSINOPHILS RELATIVE PERCENT: 3 % (ref 0–4)
ERYTHROCYTE [DISTWIDTH] IN BLOOD BY AUTOMATED COUNT: 14.3 % (ref 11.5–14.9)
GFR, ESTIMATED: 69 ML/MIN/1.73M2
GLUCOSE SERPL-MCNC: 128 MG/DL (ref 70–99)
HCT VFR BLD AUTO: 36.9 % (ref 36–46)
HGB BLD-MCNC: 12.1 G/DL (ref 12–16)
LYMPHOCYTES NFR BLD: 2.5 K/UL (ref 1–4.8)
LYMPHOCYTES RELATIVE PERCENT: 35 % (ref 24–44)
MAGNESIUM SERPL-MCNC: 2.1 MG/DL (ref 1.6–2.6)
MCH RBC QN AUTO: 27.8 PG (ref 26–34)
MCHC RBC AUTO-ENTMCNC: 32.8 G/DL (ref 31–37)
MCV RBC AUTO: 84.9 FL (ref 80–100)
MONOCYTES NFR BLD: 0.5 K/UL (ref 0.1–1.3)
MONOCYTES NFR BLD: 7 % (ref 1–7)
NEUTROPHILS NFR BLD: 54 % (ref 36–66)
NEUTS SEG NFR BLD: 3.9 K/UL (ref 1.3–9.1)
PLATELET # BLD AUTO: 326 K/UL (ref 150–450)
PMV BLD AUTO: 7.2 FL (ref 6–12)
POTASSIUM SERPL-SCNC: 4.5 MMOL/L (ref 3.7–5.3)
PROT SERPL-MCNC: 6.4 G/DL (ref 6.4–8.3)
RBC # BLD AUTO: 4.35 M/UL (ref 4–5.2)
SODIUM SERPL-SCNC: 143 MMOL/L (ref 135–144)
WBC OTHER # BLD: 7.3 K/UL (ref 3.5–11)

## 2024-08-23 PROCEDURE — 99284 EMERGENCY DEPT VISIT MOD MDM: CPT

## 2024-08-23 PROCEDURE — 85025 COMPLETE CBC W/AUTO DIFF WBC: CPT

## 2024-08-23 PROCEDURE — 82550 ASSAY OF CK (CPK): CPT

## 2024-08-23 PROCEDURE — 83735 ASSAY OF MAGNESIUM: CPT

## 2024-08-23 PROCEDURE — 6370000000 HC RX 637 (ALT 250 FOR IP): Performed by: EMERGENCY MEDICINE

## 2024-08-23 PROCEDURE — 80053 COMPREHEN METABOLIC PANEL: CPT

## 2024-08-23 PROCEDURE — 2580000003 HC RX 258: Performed by: EMERGENCY MEDICINE

## 2024-08-23 PROCEDURE — 36415 COLL VENOUS BLD VENIPUNCTURE: CPT

## 2024-08-23 RX ORDER — 0.9 % SODIUM CHLORIDE 0.9 %
1000 INTRAVENOUS SOLUTION INTRAVENOUS ONCE
Status: COMPLETED | OUTPATIENT
Start: 2024-08-23 | End: 2024-08-23

## 2024-08-23 RX ORDER — CYCLOBENZAPRINE HCL 10 MG
10 TABLET ORAL ONCE
Status: COMPLETED | OUTPATIENT
Start: 2024-08-23 | End: 2024-08-23

## 2024-08-23 RX ADMIN — CYCLOBENZAPRINE 10 MG: 10 TABLET, FILM COATED ORAL at 11:27

## 2024-08-23 RX ADMIN — SODIUM CHLORIDE 1000 ML: 9 INJECTION, SOLUTION INTRAVENOUS at 11:22

## 2024-08-23 ASSESSMENT — PAIN DESCRIPTION - LOCATION: LOCATION: BACK

## 2024-08-23 ASSESSMENT — PAIN SCALES - GENERAL: PAINLEVEL_OUTOF10: 10

## 2024-08-23 NOTE — ED PROVIDER NOTES
[facility] ED  EMERGENCY DEPARTMENT ENCOUNTER      Pt Name: Hyun Najera  MRN: [id number]  Birthdate 1955  Date of evaluation: 8/23/24      CHIEF COMPLAINT       Chief Complaint   Patient presents with    Joint Pain     HISTORY OF PRESENT ILLNESS   HPI 68 y.o. female presents with c/o body pain.  Pt reports that she had a colonoscopy on Tuesday, and then Wednesday morning around 4am she woke up with generalized body aches primarily in bilateral.  Thighs, calf muscles, bilateral low back.  She denies any falls or trauma there is no leg redness or swelling.  The patient denies any chest pain or shortness of breath.    REVIEW OF SYSTEMS       Review of Systems  10 systems reviewed and negative unless otherwise noted in the HPI  PAST MEDICAL HISTORY     Past Medical History:   Diagnosis Date    Adrenal nodule (HCC) 03/12/2024    3.3 cm right CT scan 3/12/24    Asthma 10/07/2013    Cholelithiasis     Chronic rhinosinusitis 10/07/2013    Family history of diabetes mellitus (DM) 10/07/2013    Former smoker 10/07/2013    Full dentures     upper and lower    Inguinal hernia 03/12/2024    CT scan 3/12/24 fat containing    Pulmonary nodule 03/12/2024    1.4 cm on CT scan 3/12/24    Umbilical hernia     CT scan 3/12/24- fat containing       SURGICAL HISTORY       Past Surgical History:   Procedure Laterality Date    CHOLECYSTECTOMY N/A 4/3/2024    CHOLECYSTECTOMY LAPAROSCOPIC ROBOTIC XI performed by Oswaldo Grewal MD at [facility] OR    COLONOSCOPY N/A 8/21/2024    COLONOSCOPY POLYPECTOMY HOT BIOPSY/ WITH RESOLUTION CLIP APPLICATION performed by Oswaldo Grewal MD at [facility] OR    EYE SURGERY Bilateral     cataracts    TONSILLECTOMY  01/01/1983    UMBILICAL HERNIA REPAIR N/A 7/17/2024    OPEN HERNIA UMBILICAL REPAIR  WITH  MESH performed by Oswaldo Grewal MD at [facility] OR       CURRENT MEDICATIONS       Discharge Medication List as of 8/23/2024  1:20 PM        CONTINUE these medications which have NOT CHANGED    Details

## 2024-08-23 NOTE — TELEPHONE ENCOUNTER
Patient called into the office stating she had a colonoscopy Wednesday 8/21 and now she woke up this morning and can hardly move I asked her if she contacted her primary care because I don't think her joint stiffness would be due to the procedure and she states it has to be related because she did not have this issue prior to the procedure. she states she has no issues with going to the bathroom no fever or chills no abdominal pain. I advised the patient to contact her PCP or to go to [facility].

## 2024-08-26 LAB — SURGICAL PATHOLOGY REPORT: NORMAL

## 2024-08-29 ENCOUNTER — ANESTHESIA EVENT (OUTPATIENT)
Dept: OPERATING ROOM | Age: 69
End: 2024-08-29
Payer: MEDICARE

## 2024-08-29 NOTE — PRE-PROCEDURE INSTRUCTIONS
Nothing to eat after midnight. Y  Are you taking any blood thinners? When was the last day?N  Make sure to use Hibiclens prior to surgery.  Remove any jewelry and body piercings.Y  Do you wear glasses? If so, please bring a case to store them in.  Are you having any Covid symptoms?N  Do you have any new rashes, infections, etc. that we should be aware of?N  Do you have a ride home the day of surgery? It cannot be a cab or medical transportation.Y  Verify surgery time and what time to arrive at hospital.[id number]

## 2024-08-30 ENCOUNTER — ANESTHESIA (OUTPATIENT)
Dept: OPERATING ROOM | Age: 69
End: 2024-08-30
Payer: MEDICARE

## 2024-08-30 ENCOUNTER — HOSPITAL ENCOUNTER (OUTPATIENT)
Age: 69
Setting detail: OUTPATIENT SURGERY
Discharge: HOME | End: 2024-08-30
Attending: OBSTETRICS & GYNECOLOGY | Admitting: OBSTETRICS & GYNECOLOGY
Payer: MEDICARE

## 2024-08-30 VITALS
SYSTOLIC BLOOD PRESSURE: 153 MMHG | BODY MASS INDEX: 42.42 KG/M2 | RESPIRATION RATE: 16 BRPM | TEMPERATURE: 97 F | DIASTOLIC BLOOD PRESSURE: 78 MMHG | HEIGHT: 60 IN | OXYGEN SATURATION: 96 % | WEIGHT: 216.05 LBS | HEART RATE: 59 BPM

## 2024-08-30 DIAGNOSIS — R93.89 THICKENED ENDOMETRIUM: ICD-10-CM

## 2024-08-30 DIAGNOSIS — R93.89 ENDOMETRIAL THICKENING ON ULTRASOUND: ICD-10-CM

## 2024-08-30 PROBLEM — Z98.890 STATUS POST D&C: Status: ACTIVE | Noted: 2024-08-30

## 2024-08-30 PROBLEM — Z98.890 POST-OPERATIVE STATE: Status: ACTIVE | Noted: 2024-08-30

## 2024-08-30 PROCEDURE — 2580000003 HC RX 258: Performed by: ANESTHESIOLOGY

## 2024-08-30 PROCEDURE — 7100000030 HC ASPR PHASE II RECOVERY - FIRST 15 MIN: Performed by: OBSTETRICS & GYNECOLOGY

## 2024-08-30 PROCEDURE — 7100000010 HC PHASE II RECOVERY - FIRST 15 MIN: Performed by: OBSTETRICS & GYNECOLOGY

## 2024-08-30 PROCEDURE — 7100000011 HC PHASE II RECOVERY - ADDTL 15 MIN: Performed by: OBSTETRICS & GYNECOLOGY

## 2024-08-30 PROCEDURE — 3600000013 HC SURGERY LEVEL 3 ADDTL 15MIN: Performed by: OBSTETRICS & GYNECOLOGY

## 2024-08-30 PROCEDURE — 7100000000 HC PACU RECOVERY - FIRST 15 MIN: Performed by: OBSTETRICS & GYNECOLOGY

## 2024-08-30 PROCEDURE — 2709999900 HC NON-CHARGEABLE SUPPLY: Performed by: OBSTETRICS & GYNECOLOGY

## 2024-08-30 PROCEDURE — 3700000001 HC ADD 15 MINUTES (ANESTHESIA): Performed by: OBSTETRICS & GYNECOLOGY

## 2024-08-30 PROCEDURE — 6370000000 HC RX 637 (ALT 250 FOR IP): Performed by: ANESTHESIOLOGY

## 2024-08-30 PROCEDURE — 58558 HYSTEROSCOPY BIOPSY: CPT | Performed by: OBSTETRICS & GYNECOLOGY

## 2024-08-30 PROCEDURE — 3700000000 HC ANESTHESIA ATTENDED CARE: Performed by: OBSTETRICS & GYNECOLOGY

## 2024-08-30 PROCEDURE — 3600000003 HC SURGERY LEVEL 3 BASE: Performed by: OBSTETRICS & GYNECOLOGY

## 2024-08-30 PROCEDURE — 2720000010 HC SURG SUPPLY STERILE: Performed by: OBSTETRICS & GYNECOLOGY

## 2024-08-30 PROCEDURE — 7100000031 HC ASPR PHASE II RECOVERY - ADDTL 15 MIN: Performed by: OBSTETRICS & GYNECOLOGY

## 2024-08-30 PROCEDURE — 6360000002 HC RX W HCPCS: Performed by: NURSE ANESTHETIST, CERTIFIED REGISTERED

## 2024-08-30 PROCEDURE — 7100000001 HC PACU RECOVERY - ADDTL 15 MIN: Performed by: OBSTETRICS & GYNECOLOGY

## 2024-08-30 PROCEDURE — 2500000003 HC RX 250 WO HCPCS: Performed by: ANESTHESIOLOGY

## 2024-08-30 PROCEDURE — 2500000003 HC RX 250 WO HCPCS: Performed by: NURSE ANESTHETIST, CERTIFIED REGISTERED

## 2024-08-30 PROCEDURE — 88305 TISSUE EXAM BY PATHOLOGIST: CPT

## 2024-08-30 RX ORDER — SODIUM CHLORIDE 0.9 % (FLUSH) 0.9 %
5-40 SYRINGE (ML) INJECTION PRN
Status: DISCONTINUED | OUTPATIENT
Start: 2024-08-30 | End: 2024-08-30 | Stop reason: HOSPADM

## 2024-08-30 RX ORDER — SODIUM CHLORIDE, SODIUM LACTATE, POTASSIUM CHLORIDE, CALCIUM CHLORIDE 600; 310; 30; 20 MG/100ML; MG/100ML; MG/100ML; MG/100ML
INJECTION, SOLUTION INTRAVENOUS CONTINUOUS
Status: DISCONTINUED | OUTPATIENT
Start: 2024-08-30 | End: 2024-08-30 | Stop reason: HOSPADM

## 2024-08-30 RX ORDER — ONDANSETRON 2 MG/ML
4 INJECTION INTRAMUSCULAR; INTRAVENOUS
Status: DISCONTINUED | OUTPATIENT
Start: 2024-08-30 | End: 2024-08-30 | Stop reason: HOSPADM

## 2024-08-30 RX ORDER — SODIUM CHLORIDE 0.9 % (FLUSH) 0.9 %
5-40 SYRINGE (ML) INJECTION EVERY 12 HOURS SCHEDULED
Status: DISCONTINUED | OUTPATIENT
Start: 2024-08-30 | End: 2024-08-30 | Stop reason: HOSPADM

## 2024-08-30 RX ORDER — LIDOCAINE HYDROCHLORIDE 10 MG/ML
INJECTION, SOLUTION EPIDURAL; INFILTRATION; INTRACAUDAL; PERINEURAL PRN
Status: DISCONTINUED | OUTPATIENT
Start: 2024-08-30 | End: 2024-08-30 | Stop reason: SDUPTHER

## 2024-08-30 RX ORDER — ONDANSETRON 2 MG/ML
INJECTION INTRAMUSCULAR; INTRAVENOUS PRN
Status: DISCONTINUED | OUTPATIENT
Start: 2024-08-30 | End: 2024-08-30 | Stop reason: SDUPTHER

## 2024-08-30 RX ORDER — FENTANYL CITRATE 50 UG/ML
INJECTION, SOLUTION INTRAMUSCULAR; INTRAVENOUS PRN
Status: DISCONTINUED | OUTPATIENT
Start: 2024-08-30 | End: 2024-08-30 | Stop reason: SDUPTHER

## 2024-08-30 RX ORDER — ACETAMINOPHEN 500 MG
1000 TABLET ORAL ONCE
Status: COMPLETED | OUTPATIENT
Start: 2024-08-30 | End: 2024-08-30

## 2024-08-30 RX ORDER — GABAPENTIN 100 MG/1
100 CAPSULE ORAL ONCE
Status: COMPLETED | OUTPATIENT
Start: 2024-08-30 | End: 2024-08-30

## 2024-08-30 RX ORDER — DEXAMETHASONE SODIUM PHOSPHATE 4 MG/ML
INJECTION, SOLUTION INTRA-ARTICULAR; INTRALESIONAL; INTRAMUSCULAR; INTRAVENOUS; SOFT TISSUE PRN
Status: DISCONTINUED | OUTPATIENT
Start: 2024-08-30 | End: 2024-08-30 | Stop reason: SDUPTHER

## 2024-08-30 RX ORDER — NALOXONE HYDROCHLORIDE 0.4 MG/ML
INJECTION, SOLUTION INTRAMUSCULAR; INTRAVENOUS; SUBCUTANEOUS PRN
Status: DISCONTINUED | OUTPATIENT
Start: 2024-08-30 | End: 2024-08-30 | Stop reason: HOSPADM

## 2024-08-30 RX ORDER — DIPHENHYDRAMINE HYDROCHLORIDE 50 MG/ML
12.5 INJECTION INTRAMUSCULAR; INTRAVENOUS
Status: DISCONTINUED | OUTPATIENT
Start: 2024-08-30 | End: 2024-08-30 | Stop reason: HOSPADM

## 2024-08-30 RX ORDER — LIDOCAINE HYDROCHLORIDE 10 MG/ML
1 INJECTION, SOLUTION EPIDURAL; INFILTRATION; INTRACAUDAL; PERINEURAL
Status: COMPLETED | OUTPATIENT
Start: 2024-08-30 | End: 2024-08-30

## 2024-08-30 RX ORDER — MIDAZOLAM HYDROCHLORIDE 1 MG/ML
INJECTION INTRAMUSCULAR; INTRAVENOUS PRN
Status: DISCONTINUED | OUTPATIENT
Start: 2024-08-30 | End: 2024-08-30 | Stop reason: SDUPTHER

## 2024-08-30 RX ORDER — METOCLOPRAMIDE HYDROCHLORIDE 5 MG/ML
10 INJECTION INTRAMUSCULAR; INTRAVENOUS
Status: DISCONTINUED | OUTPATIENT
Start: 2024-08-30 | End: 2024-08-30 | Stop reason: HOSPADM

## 2024-08-30 RX ORDER — SODIUM CHLORIDE 9 MG/ML
INJECTION, SOLUTION INTRAVENOUS PRN
Status: DISCONTINUED | OUTPATIENT
Start: 2024-08-30 | End: 2024-08-30 | Stop reason: HOSPADM

## 2024-08-30 RX ORDER — FENTANYL CITRATE 0.05 MG/ML
25 INJECTION, SOLUTION INTRAMUSCULAR; INTRAVENOUS EVERY 5 MIN PRN
Status: DISCONTINUED | OUTPATIENT
Start: 2024-08-30 | End: 2024-08-30 | Stop reason: HOSPADM

## 2024-08-30 RX ORDER — PROPOFOL 10 MG/ML
INJECTION, EMULSION INTRAVENOUS PRN
Status: DISCONTINUED | OUTPATIENT
Start: 2024-08-30 | End: 2024-08-30 | Stop reason: SDUPTHER

## 2024-08-30 RX ADMIN — ONDANSETRON 4 MG: 2 INJECTION INTRAMUSCULAR; INTRAVENOUS at 11:59

## 2024-08-30 RX ADMIN — PROPOFOL 200 MG: 10 INJECTION, EMULSION INTRAVENOUS at 11:45

## 2024-08-30 RX ADMIN — ACETAMINOPHEN 1000 MG: 500 TABLET ORAL at 09:12

## 2024-08-30 RX ADMIN — LIDOCAINE HYDROCHLORIDE 1 ML: 10 INJECTION, SOLUTION EPIDURAL; INFILTRATION; INTRACAUDAL; PERINEURAL at 09:07

## 2024-08-30 RX ADMIN — GABAPENTIN 100 MG: 100 CAPSULE ORAL at 09:12

## 2024-08-30 RX ADMIN — FENTANYL CITRATE 50 MCG: 50 INJECTION INTRAMUSCULAR; INTRAVENOUS at 11:45

## 2024-08-30 RX ADMIN — FENTANYL CITRATE 50 MCG: 50 INJECTION INTRAMUSCULAR; INTRAVENOUS at 12:04

## 2024-08-30 RX ADMIN — DEXAMETHASONE SODIUM PHOSPHATE 4 MG: 4 INJECTION INTRA-ARTICULAR; INTRALESIONAL; INTRAMUSCULAR; INTRAVENOUS; SOFT TISSUE at 11:59

## 2024-08-30 RX ADMIN — MIDAZOLAM 2 MG: 1 INJECTION INTRAMUSCULAR; INTRAVENOUS at 11:45

## 2024-08-30 RX ADMIN — LIDOCAINE HYDROCHLORIDE 40 MG: 10 INJECTION, SOLUTION EPIDURAL; INFILTRATION; INTRACAUDAL; PERINEURAL at 11:45

## 2024-08-30 RX ADMIN — SODIUM CHLORIDE, POTASSIUM CHLORIDE, SODIUM LACTATE AND CALCIUM CHLORIDE: 600; 310; 30; 20 INJECTION, SOLUTION INTRAVENOUS at 09:07

## 2024-08-30 ASSESSMENT — PAIN - FUNCTIONAL ASSESSMENT
PAIN_FUNCTIONAL_ASSESSMENT: NONE - DENIES PAIN
PAIN_FUNCTIONAL_ASSESSMENT: NONE - DENIES PAIN
PAIN_FUNCTIONAL_ASSESSMENT: 0-10

## 2024-08-30 NOTE — ANESTHESIA PRE PROCEDURE
BMI:   Wt Readings from Last 3 Encounters:   08/23/24 98 kg (216 lb)   08/20/24 98 kg (216 lb)   08/21/24 98 kg (216 lb)     There is no height or weight on file to calculate BMI.    CBC:   Lab Results   Component Value Date/Time    WBC 7.3 08/23/2024 11:24 AM    RBC 4.35 08/23/2024 11:24 AM    HGB 12.1 08/23/2024 11:24 AM    HCT 36.9 08/23/2024 11:24 AM    MCV 84.9 08/23/2024 11:24 AM    RDW 14.3 08/23/2024 11:24 AM     08/23/2024 11:24 AM       CMP:   Lab Results   Component Value Date/Time     08/23/2024 11:24 AM    K 4.5 08/23/2024 11:24 AM     08/23/2024 11:24 AM    CO2 26 08/23/2024 11:24 AM    BUN 10 08/23/2024 11:24 AM    CREATININE 0.9 08/23/2024 11:24 AM    LABGLOM 69 08/23/2024 11:24 AM    LABGLOM >60 03/12/2024 06:46 PM    LABGLOM 74 02/24/2024 12:00 AM    GLUCOSE 128 08/23/2024 11:24 AM    CALCIUM 8.4 08/23/2024 11:24 AM    BILITOT 0.3 08/23/2024 11:24 AM    ALKPHOS 128 08/23/2024 11:24 AM    AST 28 08/23/2024 11:24 AM    ALT 19 08/23/2024 11:24 AM       POC Tests: No results for input(s): \"POCGLU\", \"POCNA\", \"POCK\", \"POCCL\", \"POCBUN\", \"POCHEMO\", \"POCHCT\" in the last 72 hours.    Coags: No results found for: \"PROTIME\", \"INR\", \"APTT\"    HCG (If Applicable): No results found for: \"PREGTESTUR\", \"PREGSERUM\", \"HCG\", \"HCGQUANT\"     ABGs: No results found for: \"PHART\", \"PO2ART\", \"ZIL3PKL\", \"BZA5WJR\", \"BEART\", \"Y5YPMIPX\"     Type & Screen (If Applicable):  No results found for: \"LABABO\"    Drug/Infectious Status (If Applicable):  No results found for: \"HIV\", \"HEPCAB\"    COVID-19 Screening (If Applicable):   Lab Results   Component Value Date/Time    COVID19 Not Detected 10/12/2022 07:35 PM           Anesthesia Evaluation  Patient summary reviewed and Nursing notes reviewed   no history of anesthetic complications:   Airway: Mallampati: II  TM distance: >3 FB   Neck ROM: full  Mouth opening: > = 3 FB   Dental:    (+) edentulous      Pulmonary: breath

## 2024-08-30 NOTE — H&P
HISTORY & PHYSICAL PRE-OP UPDATE NOTE    Gynecologic Surgery Pre-Operative Attestation/update Note:  24       Facility: [facility]  Date: 2024  Time: 11:20 AM      Patient Name: Hyun Najera  Patient : 1955  Room/Bed: [facility] OR Pool/NONE  Admission Date/Time: 2024  8:28 AM  MRN #: [id number]  [facility] #: [id number]    Past Medical History:   Diagnosis Date    Adrenal nodule (HCC) 2024    3.3 cm right CT scan 3/12/24    Asthma 10/07/2013    Cholelithiasis     Chronic rhinosinusitis 10/07/2013    Family history of diabetes mellitus (DM) 10/07/2013    Former smoker 10/07/2013    Full dentures     upper and lower    Inguinal hernia 2024    CT scan 3/12/24 fat containing    Pulmonary nodule 2024    1.4 cm on CT scan 3/12/24    Umbilical hernia     CT scan 3/12/24- fat containing         Past Surgical History:   Procedure Laterality Date    CHOLECYSTECTOMY N/A 4/3/2024    CHOLECYSTECTOMY LAPAROSCOPIC ROBOTIC XI performed by Oswaldo Grewal MD at [facility] OR    COLONOSCOPY N/A 2024    COLONOSCOPY POLYPECTOMY HOT BIOPSY/ WITH RESOLUTION CLIP APPLICATION performed by Oswaldo Grewal MD at [facility] OR    EYE SURGERY Bilateral     cataracts    TONSILLECTOMY  1983    UMBILICAL HERNIA REPAIR N/A 2024    OPEN HERNIA UMBILICAL REPAIR  WITH  MESH performed by Oswaldo Grewal MD at [facility] OR         No current facility-administered medications on file prior to encounter.     Current Outpatient Medications on File Prior to Encounter   Medication Sig Dispense Refill    vitamin D (ERGOCALCIFEROL) 1.25 MG (03404 UT) CAPS capsule Take 1 capsule by mouth once a week      calcium carbonate (OSCAL) 500 MG TABS tablet Take 1 tablet by mouth daily      fluticasone-salmeterol (ADVAIR) 250-50 MCG/ACT AEPB diskus inhaler as needed Once daily-dr strong      meclizine (ANTIVERT) 25 MG tablet Take 1 tablet by mouth 3 times daily as needed for Dizziness 20 tablet 0    fluticasone  Protein 08/23/2024 6.4  6.4 - 8.3 g/dL Final    Albumin 08/23/2024 3.8  3.5 - 5.2 g/dL Final    Total Bilirubin 08/23/2024 0.3  0.3 - 1.2 mg/dL Final    Alkaline Phosphatase 08/23/2024 128 (H)  35 - 104 U/L Final    ALT 08/23/2024 19  5 - 33 U/L Final    AST 08/23/2024 28  <32 U/L Final    Total CK 08/23/2024 217 (H)  26 - 192 U/L Final    Magnesium 08/23/2024 2.1  1.6 - 2.6 mg/dL Final   Admission on 08/21/2024, Discharged on 08/21/2024   Component Date Value Ref Range Status    Surgical Pathology Report 08/21/2024    Final                    Value:Path Number: [id number]    -- Diagnosis --  A.  Colon, cecum, colonoscopic polypectomy:  Sessile serrated lesion/sessile serrated adenoma.    B.  Colon, ascending, colonoscopic polypectomy:  Sessile serrated lesion/sessile serrated adenoma.    C.  Colon, sigmoid, colonoscopic polypectomy:  Adenomatous polyp.  Hyperplastic polyp.    D.  Colon, rectosigmoid, colonoscopic polypectomy:  Hyperplastic polyp.      ARIAN Cavazos.  **Electronically Signed Out**         sf/8/26/2024     Clinical Information  Pre-Op Diagnosis:  POSITIVE FIT (FECAL IMMUNOCHEMICAL TEST)  Operative Findings:  CECAL POLYP; ASCENDING COLON POLYP; SIGMOID  POLYP; RECTOSIGMOID POLYP  Operation Performed:  COLONOSCOPY POLYPECTOMY HOT BIOPSY/SNARE WITH  RESOLUTION CLIP APPLICATION  mj        Source of Specimen  A: CECAL POLYP  B: ASCENDING COLON POLYP  C: SIGMOID POLYP  D: RECTO SIGMOID POLYP      Gross Description  A.  \"LIZZIE WOOD, CECAL POLYP\" Received in formalin are two soft-tan  tissue fragments, 0.3 and 0.                          4 cm and are 0.7 x 0.3 x 0.2 cm in  aggregate. Entirely 1 cs.   B.  \"LIZZIE WOOD, ASCENDING COLON POLYP\" Received in formalin are two  pink-tan tissue fragments, 0.3 and 0.5 cm and are 0.8 x 0.3 x 0.2 cm  in aggregate. Entirely 1 cs.   C.  \"LIZZIE WOOD, SIGMOID POLYP\" Received in formalin are two pink-tan  tissue fragments, 0.4 x 0.3 x 0.2 cm and 0.7 x 0.6 x 0.4  medical and surgical, conservative as well as definitive. She has elected to proceed with the procedure as stated above.     The patient was counseled on the procedure. Risks and complications were reviewed in detail. The patients orders, labs, consents have been completed. The history and physical as well as all supporting surgical documentation will be forwarded to the pre-operative holding area.     The patient is aware that this procedure may not alleviate her symptoms. That there may be a necessity for a second surgery and that there may be an incomplete removal of abnormal tissue.                         Home care, Restrictions & Follow up Care review completed    Planned Office Follow up was reviewed with the patient and her family and is for  2-3 weeks. The patient will call to make this appointment unless she already has done so.      Electronically signed by Edwardo Concepcion DO on 8/30/2024 at 11:20 AM

## 2024-08-30 NOTE — ANESTHESIA POSTPROCEDURE EVALUATION
Department of Anesthesiology  Postprocedure Note    Patient: Hyun Najera  MRN: [id number]  YOB: 1955  Date of evaluation: 8/30/2024    Procedure Summary       Date: 08/30/24 Room / Location: [address]    Anesthesia Start: 1141 Anesthesia Stop: 1229    Procedure: DILATATION AND CURETTAGE HYSTEROSCOPY WITH MYOSURE SUSPECTED POLYPECTOMY (Uterus) Diagnosis:       Endometrial thickening on ultrasound      Thickened endometrium      (Endometrial thickening on ultrasound [R93.89])      (Thickened endometrium [R93.89])    Surgeons: Edwardo Concepcion DO Responsible Provider: Thanh Yañez MD    Anesthesia Type: General ASA Status: 3            Anesthesia Type: General    Don Phase I: Don Score: 10    Don Phase II:      Anesthesia Post Evaluation    Patient location during evaluation: PACU  Patient participation: complete - patient participated  Level of consciousness: awake and alert  Airway patency: patent  Nausea & Vomiting: no vomiting  Cardiovascular status: hemodynamically stable  Respiratory status: acceptable  Hydration status: euvolemic  Comments: POST- ANESTHESIA EVALUATION       Pt Name: Hyun Najera  MRN: [id number]  YOB: 1955  Date of evaluation: 8/30/2024  Time:  12:55 PM      BP (!) 148/92   Pulse 63   Temp 97.9 °F (36.6 °C) (Infrared)   Resp 10   Ht 1.524 m (5')   Wt 98 kg (216 lb 0.8 oz)   SpO2 97%   BMI 42.19 kg/m²      Consciousness Level  Awake  Cardiopulmonary Status  Stable  Pain Adequately Treated YES  Nausea / Vomiting  NO  Adequate Hydration  YES  Anesthesia Related Complications NONE      Electronically signed by Thanh Yañez MD on 8/30/2024 at 12:55 PM         Pain management: satisfactory to patient    No notable events documented.

## 2024-08-30 NOTE — INTERVAL H&P NOTE
Update History & Physical- (Gynecology)    [facility]  Gynecologic Surgery  PHYSICIAN PRE-OPERATIVE NOTE:    Patient Name: Hyun Najera  Patient : 1955  Room/Bed: [facility] OR [place]/NONE  Admission Date/Time: 2024  8:28 AM  Primary Care Physician: Irwin Sánchez MD  MRN #: [id number]  [facility] #: [id number]    Date: 2024  Time: 9:13 AM    The patient's History and Physical was reviewed with the patient and there were no significant changes.    I examined the patient and there were no significant changes from the previous History and Physical.    Plan: The risk, benefits, expected outcome, and alternative to the recommended procedure have been discussed with the patient.  Patient understands and wants to proceed with the procedure. She is aware that there may be a need for a second procedure and there may be incomplete removal of any abnormal tissue. She was also counseled on the possibility of Bleeding, Infection, possible damage to bowel, bladder,  ureters, vasculature and surrounding organs-(described in layman's terminology to the patient).    The labs and consent were reviewed prior to the patient going to the Operating Room.      Vitals:    24 0837 24 0851   BP:  (!) 145/82   Pulse:  65   Resp:  18   Temp:  97.8 °F (36.6 °C)   TempSrc:  Infrared   SpO2:  97%   Weight: 98 kg (216 lb 0.8 oz)    Height: 1.524 m (5')        PE-per resident note-Unchanged    Patient Active Problem List    Diagnosis Date Noted    Morbid obesity with BMI of 40.0-44.9, adult (HCC) 2022     Priority: Medium    Sprain of left lower leg 10/07/2017     Priority: Medium    Pain of left lower extremity 10/07/2017     Priority: Medium    Personal history of COVID-19 2024    IFG (impaired fasting glucose) 2023    Prediabetes 2023    Mixed hyperlipidemia 2023    Enthesopathy of ankle and tarsus 2015    Exostosis 2015    Plantar fasciitis of right foot 2015     08/13/2024 [id number]   Final    ABO/Rh 08/13/2024 O POSITIVE   Final    Antibody Screen 08/13/2024 NEGATIVE   Final    Specimen Description 08/13/2024 .CLEAN CATCH URINE   Final    Special Requests 08/13/2024 Site: Urine   Final    Culture 08/13/2024 NO SIGNIFICANT GROWTH   Final    WBC 08/13/2024 9.0  3.5 - 11.0 k/uL Final    RBC 08/13/2024 4.56  4.0 - 5.2 m/uL Final    Hemoglobin 08/13/2024 12.8  12.0 - 16.0 g/dL Final    Hematocrit 08/13/2024 39.1  36 - 46 % Final    MCV 08/13/2024 85.6  80 - 100 fL Final    MCH 08/13/2024 28.0  26 - 34 pg Final    MCHC 08/13/2024 32.7  31 - 37 g/dL Final    RDW 08/13/2024 14.5  11.5 - 14.9 % Final    Platelets 08/13/2024 283  150 - 450 k/uL Final    MPV 08/13/2024 7.6  6.0 - 12.0 fL Final    Neutrophils % 08/13/2024 54  36 - 66 % Final    Lymphocytes % 08/13/2024 31  24 - 44 % Final    Monocytes % 08/13/2024 9 (H)  1 - 7 % Final    Eosinophils % 08/13/2024 5 (H)  0 - 4 % Final    Basophils % 08/13/2024 1  0 - 2 % Final    Neutrophils Absolute 08/13/2024 4.80  1.3 - 9.1 k/uL Final    Lymphocytes Absolute 08/13/2024 2.80  1.0 - 4.8 k/uL Final    Monocytes Absolute 08/13/2024 0.80  0.1 - 1.3 k/uL Final    Eosinophils Absolute 08/13/2024 0.50 (H)  0.0 - 0.4 k/uL Final    Basophils Absolute 08/13/2024 0.10  0.0 - 0.2 k/uL Final    Sodium 08/13/2024 143  135 - 144 mmol/L Final    Potassium 08/13/2024 4.4  3.7 - 5.3 mmol/L Final    Chloride 08/13/2024 107  98 - 107 mmol/L Final    CO2 08/13/2024 24  20 - 31 mmol/L Final    Anion Gap 08/13/2024 12  9 - 17 mmol/L Final    Glucose 08/13/2024 107 (H)  70 - 99 mg/dL Final    BUN 08/13/2024 14  8 - 23 mg/dL Final    Creatinine 08/13/2024 1.0 (H)  0.5 - 0.9 mg/dL Final    Est, Glom Filt Rate 08/13/2024 61  >60 mL/min/1.73m2 Final    Comment:       These results are not intended for use in patients <18 years of age.        eGFR results are calculated without a race factor using the 2021 CKD-EPI equation.  Careful clinical correlation is  recommended, particularly when comparing to results   calculated using previous equations.  The CKD-EPI equation is less accurate in patients with extremes of muscle mass, extra-renal   metabolism of creatine, excessive creatine ingestion, or following therapy that affects   renal tubular secretion.      Calcium 08/13/2024 8.7  8.6 - 10.4 mg/dL Final    WBC, UA 08/13/2024 10 TO 20 (A)  0 TO 5 /HPF Final    RBC, UA 08/13/2024 0 TO 2  0 TO 2 /HPF Final    Casts UA 08/13/2024 3 to 5 (A)  None /LPF Final    Epithelial Cells, UA 08/13/2024 10 TO 20  /HPF Final    Bacteria, UA 08/13/2024 None  None Final     Assessment:  1. Thickened endometrium on ultrasound    Planned Procedure:  1. Dilation and curettage with Hysteroscopy, possible Myosure    Electronically signed by Kelly Tate MD  on 8/30/2024 at 9:13 AM

## 2024-08-30 NOTE — DISCHARGE INSTRUCTIONS
[facility]stetrics [facility] Gyne[facility]  [address]  [place] [address]  [phone]          POST-OP INSTRUCTIONS FOR D&C AND/OR HYSTEROSCOPY      AFTER SURGERY:    After surgery you will remain in the recovery room for approximately two hours.  Once you are alert and awake and able to ambulate, you will be discharged home.  If you did not receive a pain medication prescription you may call our office with a pharmacy phone number and we will call in a medication for cramping.  Upon leaving the Out-Patient Department you will need someone to drive you home.  UNDER NO CIRCUMSTANCES SHOULD YOU DRIVE YOURSELF HOME.    AT HOME:    Once arriving at home, plan on resting the entire evening with very limited activity.    If you have small children, you should make arrangements for another adult to be responsible for their care.  It is not uncommon to have nausea after anesthesia.  We recommend a clear liquid diet the evening of surgery, advancing to a regular diet 24 hours after surgery or when the nausea resolves.    You may resume your normal activities the day following surgery, avoiding heavy lifting or straining.  If you are not taking any pain medications, you may be able to drive and leave the house the day following surgery.  If you are uncomfortable enough to require pain medication, we recommend that you continue to limit your activities, increase your rest and post-pone driving and all other activities until you no longer require pain medication.    It is recommended that you do not douche, use tampons or have intercourse for the first two weeks following your surgery.  You may have cramping for a few days and bleeding or spotting for several weeks.      WHEN TO CALL:    For any of the following problems you should call the office during working hours at [phone]  to reach a physician:     temperature greater than 100.6   constant, severe or increasing abdominal or pelvic pain, no relieved  by rest or pain medication  difficulty or painful voiding  persistent or increasing foul vaginal discharge  severe constipation, unrelieved by suppositories or Fleets enema  bleeding heavy enough to soak through a sanitary pad every hour or passing large clots.    FOLLOW-UP APPOINTMENT:    If you have not scheduled a post-operative appointment, please call the office between 9am-1pm or 2:30pm-5pm to schedule your appointment for 2 weeks after your surgery.    Patient Name: Hyun Najera  Date: 8/30/2024  Time: 9:16 AM      [facility] Obstetrics & Gynecology  Outpatient Surgery After Care Instructions    For Problems after Leaving the Hospital  Call [phone]  IN AN EMERGENCY CALL 911 OR GO TO THE NEAREST EMERGENCY ROOM    For The next 24 hours:  Do not: drive, work, or operate machinery  Do not: consume alcohol, tranquilizers, or sleeping medications  Do not: make important personal or business decisions    Normal post operative expectations:  A low grade fever      Orange  coloration  of skin  from  soap    Sore  scratchy  throat    Minimal  amount  of swelling  or  bruising  Drainage  from operative  site    Other:                                                      Specific problems or warning signs to watch for:    Call [phone] if any of the following occur:  Persistent bleeding not stopped by direct pressure  Coughing or vomiting a large amount of bright red blood  Unable to tolerate liquids for more than 4 hours  Severe unexplained pain or burning  Temperature greater than 101 F  Unable to urinate for more than 6 hours  Other:     Medications:  A copy of your medication reconciliation form has been given to you  You have been given drug information sheets.  Side effects, warnings and how to use these medications can be found on these sheets.   Resume your usual medications      x medications reconciled   Take prescriptions as directed             x prescriptions reviewed   Tylenol over the counter

## 2024-08-30 NOTE — OP NOTE
Gynecologic Operative Note      NAME: Hyun Najera   MRN: [id number]  CSN: [id number]  : 1955    PROCEDURE DATE: 2024     Pre-op Diagnosis: Pre-Op Diagnosis Codes:      * Endometrial thickening on ultrasound [R93.89]     * Thickened endometrium [R93.89]     Post-op Diagnosis: Same; Pathology is pending    Procedure: Procedure(s):  DILATATION AND CURETTAGE HYSTEROSCOPY WITH MYOSURE SUSPECTED POLYPECTOMY    Surgeon: Edwardo Concepcion DO    Assistant:   1.  AURE Tate MD PGY4      Circulator Documentation of Staff:  Surgeons and Role:     * Edwardo Concepcion DO - Primary    OR Staff:  Relief Scrub: Jameson Bone  Scrub Person First: Audrey Maravilla      Anesthesia Type: General  Anesthesia Staff: Anesthesiologist: Thanh Yañez MD  CRNA: Jonathan Sheriff, VALERIA Quiroz CRNA; Barbara Caraballo APRN - CRNA      Complications: None      Estimated Blood Loss: less than 5 ml  Total IV Fluids:  700 ml  Urine Output: 30 ml clear    Distention Media: NS (Accurate I/O at the completion of the procedure)    Specimens:   ID Type Source Tests Collected by Time Destination   A : ENDOMETRIAL CURRETTINGS Tissue Endometrium SURGICAL PATHOLOGY Edwardo Concepcion,  2024 1216    B : MYOSURE CURRETTINGS Tissue Endometrium SURGICAL PATHOLOGY Edwardo Concepcion, DO 2024 1219      Implants: * No implants in log *    Drains: * No LDAs found *      Condition: Stable    Findings: MP uterus ~9cm mobile no adnexal fullness BL. Smooth bladder no masses. BL ostia identified ~2 cm polyp in endometrial cavity. Resected with Myosure.   No sptums or Fibroids.      Description of Procedure: (Understanding of limitations from template op-reports exist)  The patient was seen in the pre-operative area. The procedure risk and complications were reviewed.  The consent , labs , and H&P were reviewed. The patient had all of her questions answered.  The patient was moved to the operative suite.  Time out was preformed. She was

## 2024-09-04 LAB — SURGICAL PATHOLOGY REPORT: NORMAL

## 2024-09-09 ENCOUNTER — OFFICE VISIT (OUTPATIENT)
Dept: OBGYN CLINIC | Age: 69
End: 2024-09-09
Payer: MEDICARE

## 2024-09-09 VITALS
HEIGHT: 60 IN | DIASTOLIC BLOOD PRESSURE: 82 MMHG | HEART RATE: 82 BPM | SYSTOLIC BLOOD PRESSURE: 124 MMHG | BODY MASS INDEX: 42.21 KG/M2 | WEIGHT: 215 LBS

## 2024-09-09 DIAGNOSIS — R93.89 ENDOMETRIAL THICKENING ON ULTRASOUND: ICD-10-CM

## 2024-09-09 DIAGNOSIS — R93.89 ABNORMAL FINDING ON IMAGING: Primary | ICD-10-CM

## 2024-09-09 PROCEDURE — G8399 PT W/DXA RESULTS DOCUMENT: HCPCS | Performed by: OBSTETRICS & GYNECOLOGY

## 2024-09-09 PROCEDURE — 3017F COLORECTAL CA SCREEN DOC REV: CPT | Performed by: OBSTETRICS & GYNECOLOGY

## 2024-09-09 PROCEDURE — 1090F PRES/ABSN URINE INCON ASSESS: CPT | Performed by: OBSTETRICS & GYNECOLOGY

## 2024-09-09 PROCEDURE — 1036F TOBACCO NON-USER: CPT | Performed by: OBSTETRICS & GYNECOLOGY

## 2024-09-09 PROCEDURE — G8417 CALC BMI ABV UP PARAM F/U: HCPCS | Performed by: OBSTETRICS & GYNECOLOGY

## 2024-09-09 PROCEDURE — G8427 DOCREV CUR MEDS BY ELIG CLIN: HCPCS | Performed by: OBSTETRICS & GYNECOLOGY

## 2024-09-09 PROCEDURE — 1123F ACP DISCUSS/DSCN MKR DOCD: CPT | Performed by: OBSTETRICS & GYNECOLOGY

## 2024-09-09 PROCEDURE — 99213 OFFICE O/P EST LOW 20 MIN: CPT | Performed by: OBSTETRICS & GYNECOLOGY

## 2024-09-10 ENCOUNTER — OFFICE VISIT (OUTPATIENT)
Dept: FAMILY MEDICINE CLINIC | Age: 69
End: 2024-09-10
Payer: MEDICARE

## 2024-09-10 VITALS
RESPIRATION RATE: 20 BRPM | OXYGEN SATURATION: 99 % | DIASTOLIC BLOOD PRESSURE: 70 MMHG | WEIGHT: 217.6 LBS | SYSTOLIC BLOOD PRESSURE: 122 MMHG | BODY MASS INDEX: 42.5 KG/M2 | HEART RATE: 80 BPM

## 2024-09-10 DIAGNOSIS — R73.01 IFG (IMPAIRED FASTING GLUCOSE): ICD-10-CM

## 2024-09-10 DIAGNOSIS — E66.01 MORBID OBESITY WITH BMI OF 40.0-44.9, ADULT (HCC): ICD-10-CM

## 2024-09-10 DIAGNOSIS — E78.2 MIXED HYPERLIPIDEMIA: Primary | ICD-10-CM

## 2024-09-10 DIAGNOSIS — R73.03 PREDIABETES: ICD-10-CM

## 2024-09-10 PROCEDURE — 3017F COLORECTAL CA SCREEN DOC REV: CPT | Performed by: FAMILY MEDICINE

## 2024-09-10 PROCEDURE — 1036F TOBACCO NON-USER: CPT | Performed by: FAMILY MEDICINE

## 2024-09-10 PROCEDURE — 99214 OFFICE O/P EST MOD 30 MIN: CPT | Performed by: FAMILY MEDICINE

## 2024-09-10 PROCEDURE — 1123F ACP DISCUSS/DSCN MKR DOCD: CPT | Performed by: FAMILY MEDICINE

## 2024-09-10 PROCEDURE — G8417 CALC BMI ABV UP PARAM F/U: HCPCS | Performed by: FAMILY MEDICINE

## 2024-09-10 PROCEDURE — 1090F PRES/ABSN URINE INCON ASSESS: CPT | Performed by: FAMILY MEDICINE

## 2024-09-10 PROCEDURE — G8427 DOCREV CUR MEDS BY ELIG CLIN: HCPCS | Performed by: FAMILY MEDICINE

## 2024-09-10 PROCEDURE — G8399 PT W/DXA RESULTS DOCUMENT: HCPCS | Performed by: FAMILY MEDICINE

## 2024-09-10 SDOH — ECONOMIC STABILITY: FOOD INSECURITY: WITHIN THE PAST 12 MONTHS, YOU WORRIED THAT YOUR FOOD WOULD RUN OUT BEFORE YOU GOT MONEY TO BUY MORE.: NEVER TRUE

## 2024-09-10 SDOH — ECONOMIC STABILITY: FOOD INSECURITY: WITHIN THE PAST 12 MONTHS, THE FOOD YOU BOUGHT JUST DIDN'T LAST AND YOU DIDN'T HAVE MONEY TO GET MORE.: NEVER TRUE

## 2024-09-10 SDOH — ECONOMIC STABILITY: INCOME INSECURITY: HOW HARD IS IT FOR YOU TO PAY FOR THE VERY BASICS LIKE FOOD, HOUSING, MEDICAL CARE, AND HEATING?: NOT HARD AT ALL

## 2024-09-10 ASSESSMENT — ENCOUNTER SYMPTOMS
CHEST TIGHTNESS: 0
SHORTNESS OF BREATH: 0
BLOOD IN STOOL: 0
ABDOMINAL PAIN: 0

## 2024-09-12 ENCOUNTER — OFFICE VISIT (OUTPATIENT)
Age: 69
End: 2024-09-12
Payer: MEDICARE

## 2024-09-12 VITALS
BODY MASS INDEX: 42.01 KG/M2 | HEIGHT: 60 IN | OXYGEN SATURATION: 92 % | WEIGHT: 214 LBS | RESPIRATION RATE: 16 BRPM | SYSTOLIC BLOOD PRESSURE: 120 MMHG | DIASTOLIC BLOOD PRESSURE: 61 MMHG | HEART RATE: 82 BPM

## 2024-09-12 DIAGNOSIS — Z87.19 HISTORY OF UMBILICAL HERNIA REPAIR: ICD-10-CM

## 2024-09-12 DIAGNOSIS — K57.30 SIGMOID DIVERTICULOSIS: ICD-10-CM

## 2024-09-12 DIAGNOSIS — Z98.890 HISTORY OF UMBILICAL HERNIA REPAIR: ICD-10-CM

## 2024-09-12 DIAGNOSIS — Z90.49 HISTORY OF CHOLECYSTECTOMY: ICD-10-CM

## 2024-09-12 DIAGNOSIS — K63.5 BENIGN COLON POLYP: Primary | ICD-10-CM

## 2024-09-12 DIAGNOSIS — Z87.09 HISTORY OF ASTHMA: ICD-10-CM

## 2024-09-12 PROCEDURE — 3017F COLORECTAL CA SCREEN DOC REV: CPT | Performed by: SURGERY

## 2024-09-12 PROCEDURE — 1123F ACP DISCUSS/DSCN MKR DOCD: CPT | Performed by: SURGERY

## 2024-09-12 PROCEDURE — 1036F TOBACCO NON-USER: CPT | Performed by: SURGERY

## 2024-09-12 PROCEDURE — 1090F PRES/ABSN URINE INCON ASSESS: CPT | Performed by: SURGERY

## 2024-09-12 PROCEDURE — G8399 PT W/DXA RESULTS DOCUMENT: HCPCS | Performed by: SURGERY

## 2024-09-12 PROCEDURE — G8427 DOCREV CUR MEDS BY ELIG CLIN: HCPCS | Performed by: SURGERY

## 2024-09-12 PROCEDURE — 99214 OFFICE O/P EST MOD 30 MIN: CPT | Performed by: SURGERY

## 2024-09-12 PROCEDURE — G8417 CALC BMI ABV UP PARAM F/U: HCPCS | Performed by: SURGERY

## 2024-09-16 ENCOUNTER — TELEPHONE (OUTPATIENT)
Age: 69
End: 2024-09-16

## 2024-09-24 ENCOUNTER — TELEPHONE (OUTPATIENT)
Age: 69
End: 2024-09-24

## 2024-09-24 DIAGNOSIS — Z01.818 PREOP EXAMINATION: Primary | ICD-10-CM

## 2024-09-25 RX ORDER — SODIUM, POTASSIUM,MAG SULFATES 17.5-3.13G
SOLUTION, RECONSTITUTED, ORAL ORAL
Qty: 1 EACH | Refills: 0 | Status: SHIPPED | OUTPATIENT
Start: 2024-09-25

## 2024-09-29 PROBLEM — Z98.890 POST-OPERATIVE STATE: Status: RESOLVED | Noted: 2024-08-30 | Resolved: 2024-09-29

## 2024-10-31 ENCOUNTER — HOSPITAL ENCOUNTER (OUTPATIENT)
Dept: PREADMISSION TESTING | Age: 69
Discharge: HOME | End: 2024-11-04
Payer: MEDICARE

## 2024-10-31 VITALS
DIASTOLIC BLOOD PRESSURE: 77 MMHG | WEIGHT: 200 LBS | RESPIRATION RATE: 20 BRPM | TEMPERATURE: 97.5 F | HEART RATE: 71 BPM | BODY MASS INDEX: 39.27 KG/M2 | SYSTOLIC BLOOD PRESSURE: 153 MMHG | HEIGHT: 60 IN | OXYGEN SATURATION: 96 %

## 2024-10-31 DIAGNOSIS — Z01.818 PREOP EXAMINATION: ICD-10-CM

## 2024-10-31 LAB
ABO + RH BLD: NORMAL
ALBUMIN SERPL-MCNC: 4.2 G/DL (ref 3.5–5.2)
ALP SERPL-CCNC: 152 U/L (ref 35–104)
ALT SERPL-CCNC: 13 U/L (ref 10–35)
ANION GAP SERPL CALCULATED.3IONS-SCNC: 9 MMOL/L (ref 9–16)
ARM BAND NUMBER: NORMAL
AST SERPL-CCNC: 17 U/L (ref 10–35)
BASOPHILS # BLD: 0.1 K/UL (ref 0–0.2)
BASOPHILS NFR BLD: 1 % (ref 0–2)
BILIRUB SERPL-MCNC: <0.2 MG/DL (ref 0–1.2)
BLOOD BANK SAMPLE EXPIRATION: NORMAL
BLOOD GROUP ANTIBODIES SERPL: NEGATIVE
BUN SERPL-MCNC: 15 MG/DL (ref 8–23)
CALCIUM SERPL-MCNC: 8.8 MG/DL (ref 8.6–10.4)
CHLORIDE SERPL-SCNC: 104 MMOL/L (ref 98–107)
CO2 SERPL-SCNC: 28 MMOL/L (ref 20–31)
CREAT SERPL-MCNC: 0.9 MG/DL (ref 0.7–1.2)
EOSINOPHIL # BLD: 0.4 K/UL (ref 0–0.4)
EOSINOPHILS RELATIVE PERCENT: 5 % (ref 0–4)
ERYTHROCYTE [DISTWIDTH] IN BLOOD BY AUTOMATED COUNT: 13.9 % (ref 11.5–14.9)
GFR, ESTIMATED: 69 ML/MIN/1.73M2
GLUCOSE SERPL-MCNC: 130 MG/DL (ref 74–99)
HCT VFR BLD AUTO: 41.5 % (ref 36–46)
HGB BLD-MCNC: 13.8 G/DL (ref 12–16)
LYMPHOCYTES NFR BLD: 2.2 K/UL (ref 1–4.8)
LYMPHOCYTES RELATIVE PERCENT: 27 % (ref 24–44)
MCH RBC QN AUTO: 28.7 PG (ref 26–34)
MCHC RBC AUTO-ENTMCNC: 33.2 G/DL (ref 31–37)
MCV RBC AUTO: 86.2 FL (ref 80–100)
MONOCYTES NFR BLD: 0.6 K/UL (ref 0.1–1.3)
MONOCYTES NFR BLD: 7 % (ref 1–7)
NEUTROPHILS NFR BLD: 60 % (ref 36–66)
NEUTS SEG NFR BLD: 4.8 K/UL (ref 1.3–9.1)
PLATELET # BLD AUTO: 275 K/UL (ref 150–450)
PMV BLD AUTO: 7.5 FL (ref 6–12)
POTASSIUM SERPL-SCNC: 4.2 MMOL/L (ref 3.7–5.3)
PROT SERPL-MCNC: 6.8 G/DL (ref 6.6–8.7)
RBC # BLD AUTO: 4.81 M/UL (ref 4–5.2)
SODIUM SERPL-SCNC: 141 MMOL/L (ref 136–145)
WBC OTHER # BLD: 8.1 K/UL (ref 3.5–11)

## 2024-10-31 PROCEDURE — 86900 BLOOD TYPING SEROLOGIC ABO: CPT

## 2024-10-31 PROCEDURE — 36415 COLL VENOUS BLD VENIPUNCTURE: CPT

## 2024-10-31 PROCEDURE — APPSS45 APP SPLIT SHARED TIME 31-45 MINUTES: Performed by: NURSE PRACTITIONER

## 2024-10-31 PROCEDURE — 85025 COMPLETE CBC W/AUTO DIFF WBC: CPT

## 2024-10-31 PROCEDURE — 80053 COMPREHEN METABOLIC PANEL: CPT

## 2024-10-31 PROCEDURE — 86850 RBC ANTIBODY SCREEN: CPT

## 2024-10-31 PROCEDURE — 86901 BLOOD TYPING SEROLOGIC RH(D): CPT

## 2024-10-31 ASSESSMENT — ENCOUNTER SYMPTOMS
TROUBLE SWALLOWING: 0
BACK PAIN: 0
SHORTNESS OF BREATH: 0
APNEA: 0
COUGH: 0
SORE THROAT: 0

## 2024-10-31 NOTE — H&P
HISTORY and PHYSICAL  [facility]       NAME:  Hyun Najera  MRN: [id number]   YOB: 1955   Date: 10/31/2024   Age: 68 y.o.  Gender: female     COMPLAINT AND PRESENT HISTORY:   Hyun Najera is 68 y.o.,  female, presents for pre-anesthesia/admission testing for INTRAOPERATIVE COLONOSCOPY  WITH ENDOSCOPIC MUCOSAL RESECTION per Dr. Grewal.  Primary dx: Polyp of colon, unspecified part of colon, unspecified type [K63.5].    Office note per Dr Grewal on 9/12/2024  HISTORY OF PRESENT ILLNESS: 68 y.o. female presents for a follow-up visit.  Patient recently had a colonoscopy for positive fit test.  8/21/2024.  Patient was found to have multiple findings on the colonoscopy.  Pathology results colonoscopy findings all reviewed.  My recommendations reviewed.  Patient with history of asthma.  History of robotic cholecystectomy in April 2024.  History of umbilical hernia repair in July 2024.  Does not take any blood thinners.  No history of coronary artery disease or diabetes.  Morbidly obese.  Patient is here to discuss further plans.   ASSESSMENT   68 y.o. female with positive fit test  Status post total colonoscopy to cecum with ileoscopy.  Rectosigmoid polypectomy with hot biopsy forceps.  Sigmoid polypectomies with hot snare polypectomy technique and large cold biopsy forceps with resolution clip application.  Ascending colon polypectomy with large cold biopsy forceps multiple biopsies serrated flat adenomatous type polyp in the cecum.  Pathology on the rectosigmoid polyp turned out to be hyperplastic in nature.  Sigmoid polyps turned out to be adenomatous polyp and hyperplastic polyp.  Ascending colon polyp turned out to be a sessile serrated adenomas/sessile serrated lesion.  Biopsies on the flat polyp in the cecum also turned out to be sessile serrated lesion versus sessile serrated adenoma.  History of robotic cholecystectomy in April 2020 for an umbilical hernia repair in July

## 2024-10-31 NOTE — DISCHARGE INSTRUCTIONS
Pre-op Instructions For Patient Being Admitted After Surgery    Medication Instructions:  Please stop herbs and any supplements now (includes vitamins and minerals).    Please contact your surgeon and prescribing physician for pre-op instructions for any blood thinners.    If you have inhalers/aerosol treatments at home, please use them the morning of your surgery and bring the inhalers with you to the hospital.    Please take the following medications the morning of your surgery with a sip of water:    Inhaler    Surgery Instructions:  After midnight before surgery:  Do not eat or drink anything, including water, mints, gum, and hard candy.  You may brush your teeth without swallowing.  No smoking, chewing tobacco, or street drugs.    Please shower or bathe before surgery.  If you were given Surgical Scrub Chlorhexidine Gluconate Liquid (CHG), please shower the night before and the morning of your surgery following the detailed instructions you received during your pre-admission visit.     Please do not wear any cologne, lotion, powder, deodorant, jewelry, piercings, perfume, makeup, nail polish, hair accessories, or hair spray on the day of surgery.  Wear loose comfortable clothing.    Leave your valuables at home.  Bring a storage case for any glasses/contacts.    The Day of Surgery:  Arrive at [facility] Surgery Entrance at the time directed by your surgeon and check in at the desk.    If you have a living will or healthcare power of , please bring a copy.    You will be taken to the pre-op holding area where you will be prepared for surgery.  A physical assessment will be performed by a nurse practitioner or house officer.  Your IV will be started and you will meet your anesthesiologist.    When you go to surgery, your family will be directed to the surgical waiting room, where the doctor should speak with them after your surgery.    You will be in the recovery

## 2024-10-31 NOTE — H&P (VIEW-ONLY)
Not hard at all   Food Insecurity: No Food Insecurity (9/10/2024)    Hunger Vital Sign     Worried About Running Out of Food in the Last Year: Never true     Ran Out of Food in the Last Year: Never true   Transportation Needs: Unknown (9/10/2024)    PRAPARE - Transportation     Lack of Transportation (Non-Medical): No   Physical Activity: Insufficiently Active (3/5/2024)    Exercise Vital Sign     Days of Exercise per Week: 3 days     Minutes of Exercise per Session: 30 min   Housing Stability: Unknown (9/10/2024)    Housing Stability Vital Sign     Homeless in the Last Year: No       REVIEW OF SYSTEMS      Allergies   Allergen Reactions    Latex Dermatitis    Aleve [Naproxen Sodium] Other (See Comments)     Asthma attack    Adhesive Tape      Tears skin when removed    Asa [Aspirin]        Current Outpatient Medications on File Prior to Encounter   Medication Sig Dispense Refill    sodium-potassium-mag sulfate (SUPREP BOWEL PREP KIT) 17.5-3.13-1.6 GM/177ML SOLN solution Take by mouth as directed for bowel prep. 1 each 0    ondansetron (ZOFRAN) 4 MG tablet Take 1 tablet by mouth every 6 hours as needed for Nausea or Vomiting 10 tablet 0    acetaminophen (TYLENOL) 500 MG tablet Take 1 tablet by mouth every 6 hours as needed for Pain      vitamin D (ERGOCALCIFEROL) 1.25 MG (12774 UT) CAPS capsule Take 1 capsule by mouth once a week      calcium carbonate (OSCAL) 500 MG TABS tablet Take 1 tablet by mouth daily      fluticasone-salmeterol (ADVAIR) 250-50 MCG/ACT AEPB diskus inhaler as needed Once daily-dr strong      meclizine (ANTIVERT) 25 MG tablet Take 1 tablet by mouth 3 times daily as needed for Dizziness 20 tablet 0    Handicap Placard MISC by Does not apply route Dx: COPD     Valid for 6 months 1 each 0    fluticasone (FLONASE) 50 MCG/ACT nasal spray 2 sprays by Nasal route daily 16 g 0    Loratadine (CLARITIN PO) Take 10 mg by mouth daily      albuterol sulfate HFA (PROAIR HFA) 108 (90 BASE) MCG/ACT inhaler Inhale

## 2024-11-12 NOTE — PRE-PROCEDURE INSTRUCTIONS
Have you received your Prep? Any questions with prep instructions? Y  Only Clear Liquid Diet day before.Y  Nothing to eat after midnight day before procedure.Y  Are you taking any blood thinners? If so, you need to Stop.N  Remove any jewelry and body piercings.Y  Do you wear glasses? If so, please bring a case to store them in.  Are you having any Covid symptoms?N  Do you have any new rashes, infections, etc. that we should be aware of?N  Do you have a ride home the day of surgery? It cannot be a cab or medical transportation.Y  Verify surgery time/date and what time to arrive at hospital.[id number]

## 2024-11-13 ENCOUNTER — ANESTHESIA EVENT (OUTPATIENT)
Dept: OPERATING ROOM | Age: 69
End: 2024-11-13
Payer: MEDICARE

## 2024-11-13 NOTE — PRE-PROCEDURE INSTRUCTIONS
Nothing to eat after midnight. Y  Are you taking any blood thinners? When was the last day?N  Make sure to use Hibiclens prior to surgery.Y  Remove any jewelry and body piercings.Y  Do you wear glasses? If so, please bring a case to store them in.  Are you having any Covid symptoms?N  Do you have any new rashes, infections, etc. that we should be aware of?N  Do you have a ride home the day of surgery? It cannot be a cab or medical transportation.Y  Verify surgery time and what time to arrive at hospital.[id number]

## 2024-11-14 ENCOUNTER — HOSPITAL ENCOUNTER (OUTPATIENT)
Age: 69
Setting detail: SURGERY ADMIT
Discharge: HOME | End: 2024-11-14
Attending: SURGERY | Admitting: SURGERY
Payer: MEDICARE

## 2024-11-14 ENCOUNTER — ANESTHESIA (OUTPATIENT)
Dept: OPERATING ROOM | Age: 69
End: 2024-11-14
Payer: MEDICARE

## 2024-11-14 VITALS
DIASTOLIC BLOOD PRESSURE: 81 MMHG | SYSTOLIC BLOOD PRESSURE: 149 MMHG | HEART RATE: 78 BPM | BODY MASS INDEX: 39.27 KG/M2 | WEIGHT: 200 LBS | HEIGHT: 60 IN | OXYGEN SATURATION: 95 % | RESPIRATION RATE: 16 BRPM | TEMPERATURE: 97.1 F

## 2024-11-14 DIAGNOSIS — K63.5 POLYP OF COLON, UNSPECIFIED PART OF COLON, UNSPECIFIED TYPE: ICD-10-CM

## 2024-11-14 PROCEDURE — 3609010200 HC COLONOSCOPY ABLATION TUMOR POLYP/OTHER LES: Performed by: SURGERY

## 2024-11-14 PROCEDURE — 2720000010 HC SURG SUPPLY STERILE: Performed by: SURGERY

## 2024-11-14 PROCEDURE — 2580000003 HC RX 258: Performed by: ANESTHESIOLOGY

## 2024-11-14 PROCEDURE — 2500000003 HC RX 250 WO HCPCS: Performed by: ANESTHESIOLOGY

## 2024-11-14 PROCEDURE — 7100000011 HC PHASE II RECOVERY - ADDTL 15 MIN: Performed by: SURGERY

## 2024-11-14 PROCEDURE — 2500000003 HC RX 250 WO HCPCS: Performed by: NURSE ANESTHETIST, CERTIFIED REGISTERED

## 2024-11-14 PROCEDURE — 3700000000 HC ANESTHESIA ATTENDED CARE: Performed by: SURGERY

## 2024-11-14 PROCEDURE — 6360000002 HC RX W HCPCS: Performed by: NURSE ANESTHETIST, CERTIFIED REGISTERED

## 2024-11-14 PROCEDURE — 6370000000 HC RX 637 (ALT 250 FOR IP): Performed by: ANESTHESIOLOGY

## 2024-11-14 PROCEDURE — 7100000010 HC PHASE II RECOVERY - FIRST 15 MIN: Performed by: SURGERY

## 2024-11-14 PROCEDURE — 7100000031 HC ASPR PHASE II RECOVERY - ADDTL 15 MIN: Performed by: SURGERY

## 2024-11-14 PROCEDURE — 2709999900 HC NON-CHARGEABLE SUPPLY: Performed by: SURGERY

## 2024-11-14 PROCEDURE — 7100000001 HC PACU RECOVERY - ADDTL 15 MIN: Performed by: SURGERY

## 2024-11-14 PROCEDURE — 3700000001 HC ADD 15 MINUTES (ANESTHESIA): Performed by: SURGERY

## 2024-11-14 PROCEDURE — 6360000002 HC RX W HCPCS: Performed by: NURSE PRACTITIONER

## 2024-11-14 PROCEDURE — C1889 IMPLANT/INSERT DEVICE, NOC: HCPCS | Performed by: SURGERY

## 2024-11-14 PROCEDURE — 88305 TISSUE EXAM BY PATHOLOGIST: CPT

## 2024-11-14 PROCEDURE — 7100000000 HC PACU RECOVERY - FIRST 15 MIN: Performed by: SURGERY

## 2024-11-14 PROCEDURE — 7100000030 HC ASPR PHASE II RECOVERY - FIRST 15 MIN: Performed by: SURGERY

## 2024-11-14 PROCEDURE — 6370000000 HC RX 637 (ALT 250 FOR IP): Performed by: NURSE ANESTHETIST, CERTIFIED REGISTERED

## 2024-11-14 DEVICE — CLIP INT L235CM WRK CHN DIA2.8MM OPN 11MM BRAID CATH ROT BX/10: Type: IMPLANTABLE DEVICE | Site: ASCENDING COLON | Status: FUNCTIONAL

## 2024-11-14 RX ORDER — FENTANYL CITRATE 50 UG/ML
INJECTION, SOLUTION INTRAMUSCULAR; INTRAVENOUS
Status: DISCONTINUED | OUTPATIENT
Start: 2024-11-14 | End: 2024-11-14 | Stop reason: SDUPTHER

## 2024-11-14 RX ORDER — METRONIDAZOLE 500 MG/100ML
500 INJECTION, SOLUTION INTRAVENOUS ONCE
Status: COMPLETED | OUTPATIENT
Start: 2024-11-14 | End: 2024-11-14

## 2024-11-14 RX ORDER — GLUCAGON 1 MG/ML
KIT INJECTION
Status: DISCONTINUED | OUTPATIENT
Start: 2024-11-14 | End: 2024-11-14 | Stop reason: SDUPTHER

## 2024-11-14 RX ORDER — PROPOFOL 10 MG/ML
INJECTION, EMULSION INTRAVENOUS
Status: DISCONTINUED | OUTPATIENT
Start: 2024-11-14 | End: 2024-11-14 | Stop reason: SDUPTHER

## 2024-11-14 RX ORDER — SODIUM CHLORIDE 0.9 % (FLUSH) 0.9 %
5-40 SYRINGE (ML) INJECTION EVERY 12 HOURS SCHEDULED
Status: DISCONTINUED | OUTPATIENT
Start: 2024-11-14 | End: 2024-11-14 | Stop reason: HOSPADM

## 2024-11-14 RX ORDER — SODIUM CHLORIDE 9 MG/ML
INJECTION, SOLUTION INTRAVENOUS CONTINUOUS
Status: DISCONTINUED | OUTPATIENT
Start: 2024-11-14 | End: 2024-11-14 | Stop reason: HOSPADM

## 2024-11-14 RX ORDER — SODIUM CHLORIDE 0.9 % (FLUSH) 0.9 %
5-40 SYRINGE (ML) INJECTION PRN
Status: DISCONTINUED | OUTPATIENT
Start: 2024-11-14 | End: 2024-11-14 | Stop reason: HOSPADM

## 2024-11-14 RX ORDER — ONDANSETRON 2 MG/ML
INJECTION INTRAMUSCULAR; INTRAVENOUS
Status: DISCONTINUED | OUTPATIENT
Start: 2024-11-14 | End: 2024-11-14 | Stop reason: SDUPTHER

## 2024-11-14 RX ORDER — CEFAZOLIN SODIUM/WATER 2 G/20 ML
2000 SYRINGE (ML) INTRAVENOUS ONCE
Status: COMPLETED | OUTPATIENT
Start: 2024-11-14 | End: 2024-11-14

## 2024-11-14 RX ORDER — LIDOCAINE HYDROCHLORIDE 10 MG/ML
INJECTION, SOLUTION EPIDURAL; INFILTRATION; INTRACAUDAL; PERINEURAL
Status: DISCONTINUED | OUTPATIENT
Start: 2024-11-14 | End: 2024-11-14 | Stop reason: SDUPTHER

## 2024-11-14 RX ORDER — SODIUM CHLORIDE 9 MG/ML
INJECTION, SOLUTION INTRAVENOUS PRN
Status: DISCONTINUED | OUTPATIENT
Start: 2024-11-14 | End: 2024-11-14 | Stop reason: HOSPADM

## 2024-11-14 RX ORDER — ACETAMINOPHEN 500 MG
1000 TABLET ORAL ONCE
Status: COMPLETED | OUTPATIENT
Start: 2024-11-14 | End: 2024-11-14

## 2024-11-14 RX ORDER — ROCURONIUM BROMIDE 10 MG/ML
INJECTION, SOLUTION INTRAVENOUS
Status: DISCONTINUED | OUTPATIENT
Start: 2024-11-14 | End: 2024-11-14 | Stop reason: SDUPTHER

## 2024-11-14 RX ORDER — MIDAZOLAM HYDROCHLORIDE 1 MG/ML
INJECTION, SOLUTION INTRAMUSCULAR; INTRAVENOUS
Status: DISCONTINUED | OUTPATIENT
Start: 2024-11-14 | End: 2024-11-14 | Stop reason: SDUPTHER

## 2024-11-14 RX ORDER — GABAPENTIN 100 MG/1
100 CAPSULE ORAL ONCE
Status: COMPLETED | OUTPATIENT
Start: 2024-11-14 | End: 2024-11-14

## 2024-11-14 RX ORDER — ALBUTEROL SULFATE 90 UG/1
INHALANT RESPIRATORY (INHALATION)
Status: DISCONTINUED | OUTPATIENT
Start: 2024-11-14 | End: 2024-11-14 | Stop reason: SDUPTHER

## 2024-11-14 RX ORDER — PHENYLEPHRINE HYDROCHLORIDE 10 MG/ML
INJECTION INTRAVENOUS
Status: DISCONTINUED | OUTPATIENT
Start: 2024-11-14 | End: 2024-11-14 | Stop reason: SDUPTHER

## 2024-11-14 RX ORDER — DEXAMETHASONE SODIUM PHOSPHATE 4 MG/ML
INJECTION, SOLUTION INTRA-ARTICULAR; INTRALESIONAL; INTRAMUSCULAR; INTRAVENOUS; SOFT TISSUE
Status: DISCONTINUED | OUTPATIENT
Start: 2024-11-14 | End: 2024-11-14 | Stop reason: SDUPTHER

## 2024-11-14 RX ORDER — LIDOCAINE HYDROCHLORIDE 10 MG/ML
1 INJECTION, SOLUTION EPIDURAL; INFILTRATION; INTRACAUDAL; PERINEURAL
Status: COMPLETED | OUTPATIENT
Start: 2024-11-14 | End: 2024-11-14

## 2024-11-14 RX ADMIN — LIDOCAINE HYDROCHLORIDE 50 MG: 10 INJECTION, SOLUTION EPIDURAL; INFILTRATION; INTRACAUDAL; PERINEURAL at 07:32

## 2024-11-14 RX ADMIN — PROPOFOL 150 MG: 10 INJECTION, EMULSION INTRAVENOUS at 07:32

## 2024-11-14 RX ADMIN — PHENYLEPHRINE HYDROCHLORIDE 50 MCG: 10 INJECTION INTRAVENOUS at 08:15

## 2024-11-14 RX ADMIN — METRONIDAZOLE 500 MG: 500 SOLUTION INTRAVENOUS at 07:40

## 2024-11-14 RX ADMIN — ALBUTEROL SULFATE 2 PUFF: 90 AEROSOL, METERED RESPIRATORY (INHALATION) at 08:04

## 2024-11-14 RX ADMIN — FENTANYL CITRATE 50 MCG: 50 INJECTION INTRAMUSCULAR; INTRAVENOUS at 07:32

## 2024-11-14 RX ADMIN — MIDAZOLAM 2 MG: 1 INJECTION INTRAMUSCULAR; INTRAVENOUS at 07:25

## 2024-11-14 RX ADMIN — PHENYLEPHRINE HYDROCHLORIDE 100 MCG: 10 INJECTION INTRAVENOUS at 07:38

## 2024-11-14 RX ADMIN — GLUCAGON 1 MG: KIT at 07:55

## 2024-11-14 RX ADMIN — ACETAMINOPHEN 1000 MG: 500 TABLET ORAL at 06:20

## 2024-11-14 RX ADMIN — ONDANSETRON 4 MG: 2 INJECTION, SOLUTION INTRAMUSCULAR; INTRAVENOUS at 08:10

## 2024-11-14 RX ADMIN — LIDOCAINE HYDROCHLORIDE 1 ML: 10 INJECTION, SOLUTION EPIDURAL; INFILTRATION; INTRACAUDAL; PERINEURAL at 06:20

## 2024-11-14 RX ADMIN — SUGAMMADEX 200 MG: 100 INJECTION, SOLUTION INTRAVENOUS at 08:21

## 2024-11-14 RX ADMIN — Medication 2000 MG: at 07:38

## 2024-11-14 RX ADMIN — SODIUM CHLORIDE: 9 INJECTION, SOLUTION INTRAVENOUS at 07:26

## 2024-11-14 RX ADMIN — DEXAMETHASONE SODIUM PHOSPHATE 4 MG: 4 INJECTION INTRA-ARTICULAR; INTRALESIONAL; INTRAMUSCULAR; INTRAVENOUS; SOFT TISSUE at 07:50

## 2024-11-14 RX ADMIN — FENTANYL CITRATE 50 MCG: 50 INJECTION INTRAMUSCULAR; INTRAVENOUS at 08:05

## 2024-11-14 RX ADMIN — GABAPENTIN 100 MG: 100 CAPSULE ORAL at 06:20

## 2024-11-14 RX ADMIN — ROCURONIUM BROMIDE 50 MG: 10 INJECTION, SOLUTION INTRAVENOUS at 07:32

## 2024-11-14 ASSESSMENT — PAIN - FUNCTIONAL ASSESSMENT
PAIN_FUNCTIONAL_ASSESSMENT: 0-10
PAIN_FUNCTIONAL_ASSESSMENT: NONE - DENIES PAIN

## 2024-11-14 NOTE — ANESTHESIA PRE PROCEDURE
Department of Anesthesiology  Preprocedure Note       Name:  Hyun Najera   Age:  68 y.o.  :  1955                                          MRN:  [id number]         Date:  2024      Surgeon: Surgeon(s):  Oswaldo Grewal MD Thusay, Manish, MD    Procedure: Procedure(s):  INTRAOPERATIVE COLONOSCOPY  WITH ENDOSCOPIC MUCOSAL RESECTION  BOWEL RESECTION    RIGHT HEMICOLECTOMY POSSIBLE OPEN ILEOCECETOMY    Medications prior to admission:   Prior to Admission medications    Medication Sig Start Date End Date Taking? Authorizing Provider   ondansetron (ZOFRAN) 4 MG tablet Take 1 tablet by mouth every 6 hours as needed for Nausea or Vomiting 8/15/24  Yes Nara Cunningham APRN - CNP   acetaminophen (TYLENOL) 500 MG tablet Take 1 tablet by mouth every 6 hours as needed for Pain   Yes ProviderMeryl MD   vitamin D (ERGOCALCIFEROL) 1.25 MG (39416 UT) CAPS capsule Take 1 capsule by mouth once a week   Yes ProviderMeryl MD   calcium carbonate (OSCAL) 500 MG TABS tablet Take 1 tablet by mouth daily   Yes Meryl Jasmine MD   meclizine (ANTIVERT) 25 MG tablet Take 1 tablet by mouth 3 times daily as needed for Dizziness 23  Yes Irwin Sánchez MD   fluticasone (FLONASE) 50 MCG/ACT nasal spray 2 sprays by Nasal route daily 22  Yes Marla Dale APRN - CNP   Loratadine (CLARITIN PO) Take 10 mg by mouth daily   Yes Meryl Jasmine MD   sodium-potassium-mag sulfate (SUPREP BOWEL PREP KIT) 17.5-3.13-1.6 GM/177ML SOLN solution Take by mouth as directed for bowel prep. 24   Nara Cunningham APRN - CNP   fluticasone-salmeterol (ADVAIR) 250-50 MCG/ACT AEPB diskus inhaler as needed Once daily-Meryl Sanchez MD Handicap Placard MISC by Does not apply route Dx: COPD     Valid for 6 months 22   Maribell Parra, VALERIA - NP   albuterol sulfate HFA (PROAIR HFA) 108 (90 BASE) MCG/ACT inhaler Inhale 2 puffs into the lungs every 6 hours as needed for Wheezing 16

## 2024-11-14 NOTE — INTERVAL H&P NOTE
Update History & Physical    The patient's History and Physical of October 31, 2024 was reviewed with the patient and I examined the patient. There was no change. Here today for INTRAOPERATIVE COLONOSCOPY WITH ENDOSCOPIC MUCOSAL RESECTION per Dr. Grewal.     Pt AAO x 3 in NAD. HRRR. No adventitious lung sound. No respiratory distress. NPO p MN. Took no medications this am. Denies taking any blood thinning medications. Pt reports sinus infection last week. Took OTC sinus medications with improvement of symptoms. Pt reports rash to left upper arm that she has been applying benadryl cream with improvement of symptoms. Minimal erythema to left upper arm with small hive appearing spots. No drainage or bleeding.  Denies recent or current chest pain/pressure, palpitations, SOB, fever or chills. Review vitals per RN flowsheet.         Electronically signed by VALERIA Workman CNP on 11/14/2024 at 5:38 AM

## 2024-11-14 NOTE — ANESTHESIA POSTPROCEDURE EVALUATION
Department of Anesthesiology  Postprocedure Note    Patient: Hyun Najera  MRN: [id number]  YOB: 1955  Date of evaluation: 11/14/2024    Procedure Summary       Date: 11/14/24 Room / Location: [address]    Anesthesia Start: 0726 Anesthesia Stop: 0836    Procedure: COLONOSCOPY  WITH ENDOSCOPIC MUCOSAL RESECTION AND ASCENDING COLON POLYPECTOMY (Rectum) Diagnosis:       Polyp of colon, unspecified part of colon, unspecified type      (Polyp of colon, unspecified part of colon, unspecified type [K63.5])    Surgeons: Oswaldo Grewal MD Responsible Provider: Karolyn Greer MD    Anesthesia Type: general ASA Status: 3            Anesthesia Type: No value filed.    Don Phase I: Don Score: 10    Don Phase II: Don Score: 10    Anesthesia Post Evaluation    Comments: POST- ANESTHESIA EVALUATION       Pt Name: Hyun Najera  MRN: [id number]  YOB: 1955  Date of evaluation: 11/14/2024  Time:  2:07 PM      BP (!) 149/81   Pulse 78   Temp 97.1 °F (36.2 °C) (Infrared)   Resp 16   Ht 1.524 m (5')   Wt 90.7 kg (200 lb)   SpO2 95%   BMI 39.06 kg/m²      Consciousness Level  Awake  Cardiopulmonary Status  Stable  Pain Adequately Treated YES  Nausea / Vomiting  NO  Adequate Hydration  YES  Anesthesia Related Complications NONE      Electronically signed by Karolyn Greer MD on 11/14/2024 at 2:07 PM           No notable events documented.

## 2024-11-14 NOTE — DISCHARGE INSTRUCTIONS
DISCHARGE INSTRUCTIONS FOR COLONOSCOPY    In order to continue your care at home, please follow the instructions below.    For General Anesthesia:  Do not drink any alcoholic beverages or make any legal or important decisions for 24 hours.    Do not drive or operate machinery for 24 hours.  You may return to work after 24 hours.        Diet    Drink plenty of fluids after surgery, unless you are on a fluid restriction.   After general anesthesia, start out eating lightly (broth, soup, bread, etc.) advancing as tolerated to your usual diet.  Try to avoid spicy or greasy/fatty foods for 24 hours.   Avoid milk/milk product for several hours.       Medications  Take medications as ordered by your surgeon.    Activities  Limit your activities for 24 hours.  Walk around to help pass gas.  You may shower.    Call your surgeon for the following:  If you have abdominal pain that is not relieved by passing gas.   For an oral temperature (by mouth) is 101 degrees or higher, chills or excessive sweating.  Persistent nausea or vomiting  Rectal bleeding (may be red, maroon, or black) or change in your bowel habits.  For any questions or concerns you may have.

## 2024-11-14 NOTE — OP NOTE
[facility] Surgery   Oswaldo Grewal MD, FACS  Nara Cunningham, APRN-CNP  [address], [address]  [place] [address]  P: [phone], F: [phone]    PROCEDURE NOTE    DATE OF PROCEDURE: 11/14/2024    SURGEON: Oswaldo Grewal MD    ASSISTANT: None    PREOPERATIVE DIAGNOSIS: Flat serrated adenomatous polyp cecum     POSTOPERATIVE DIAGNOSIS: Same.  Ascending colon polyp.  Sigmoid diverticulosis.    OPERATION: 1) Total colonoscopy to cecum.  2) Endo mucosal resection flat serrated adenomatous polyp cecum with multiple resolution clip applications  3) Ascending colon polypectomy with resolution clip application    ANESTHESIA: General    ESTIMATED BLOOD LOSS: None    COMPLICATIONS: None     SPECIMENS:  Was Obtained: Cecal polyp.  Ascending colon polyp.    HISTORY: The patient is a 68 y.o. year old female with history of above preop diagnosis.  I recommended colonoscopy with possible biopsy or polypectomy and I explained the risk, benefits, expected outcome, and alternatives to the procedure.  Risks included but are not limited to bleeding, infection, respiratory distress, hypotension, and perforation of the colon and possibility of missing a lesion.  The patient understands and is in agreement.      PROCEDURE: The patient was given IV conscious sedation.  The patient's SPO2 remained above 90% throughout the procedure. Digital rectal exam was normal.  The colonoscope was inserted through the anus into the rectum and advanced under direct vision to the cecum without difficulty.  Terminal ileum was examined for approximately 2 inches.  The prep was good.      Findings:    Cecum/Ascending colon: abnormal: Flat serrated adenomatous type polyp cecum.  Initially Eleview was injected at the base of the polyp and this was raised.  This flat serrated adenomatous polyp was resected using stiff small snare.  Specimen was retrieved and sent to pathology.  Mucosal defect was closed with multiple

## 2024-11-15 LAB — SURGICAL PATHOLOGY REPORT: NORMAL

## 2024-12-04 ENCOUNTER — OFFICE VISIT (OUTPATIENT)
Age: 69
End: 2024-12-04
Payer: MEDICARE

## 2024-12-04 VITALS
TEMPERATURE: 97.3 F | DIASTOLIC BLOOD PRESSURE: 88 MMHG | BODY MASS INDEX: 43.19 KG/M2 | WEIGHT: 220 LBS | OXYGEN SATURATION: 94 % | HEIGHT: 60 IN | SYSTOLIC BLOOD PRESSURE: 122 MMHG | HEART RATE: 65 BPM

## 2024-12-04 DIAGNOSIS — K63.5 BENIGN COLON POLYP: Primary | ICD-10-CM

## 2024-12-04 DIAGNOSIS — K57.30 SIGMOID DIVERTICULOSIS: ICD-10-CM

## 2024-12-04 PROCEDURE — G8427 DOCREV CUR MEDS BY ELIG CLIN: HCPCS | Performed by: SURGERY

## 2024-12-04 PROCEDURE — 1126F AMNT PAIN NOTED NONE PRSNT: CPT | Performed by: SURGERY

## 2024-12-04 PROCEDURE — G8399 PT W/DXA RESULTS DOCUMENT: HCPCS | Performed by: SURGERY

## 2024-12-04 PROCEDURE — 3017F COLORECTAL CA SCREEN DOC REV: CPT | Performed by: SURGERY

## 2024-12-04 PROCEDURE — 1159F MED LIST DOCD IN RCRD: CPT | Performed by: SURGERY

## 2024-12-04 PROCEDURE — 1123F ACP DISCUSS/DSCN MKR DOCD: CPT | Performed by: SURGERY

## 2024-12-04 PROCEDURE — 99214 OFFICE O/P EST MOD 30 MIN: CPT | Performed by: SURGERY

## 2024-12-04 PROCEDURE — 1036F TOBACCO NON-USER: CPT | Performed by: SURGERY

## 2024-12-04 PROCEDURE — G8484 FLU IMMUNIZE NO ADMIN: HCPCS | Performed by: SURGERY

## 2024-12-04 PROCEDURE — 1090F PRES/ABSN URINE INCON ASSESS: CPT | Performed by: SURGERY

## 2024-12-04 PROCEDURE — G8417 CALC BMI ABV UP PARAM F/U: HCPCS | Performed by: SURGERY

## 2024-12-04 NOTE — PATIENT INSTRUCTIONS
[facility] Surgery  Oswaldo Grewal MD, FACS  Nara Cunningham, APRN-CNP  [address], [address]  [place] [address]  Phone: [phone]  Fax: [phone]    Miralax (Polyethylene Glycol)  STOP Aspirin 7 Days prior to Procedure  STOP all other Blood Thinners 5 Days prior to Procedure    **DO NOT EAT ANY SOLID FOOD THE DAY BEFORE YOUR PROCEDURE**  **YOU MUST BE ON A CLEAR LIQUID DIET ONLY**    Approved Clear Liquids:  Any flavor of soda except Red or Purple  Fruit Juice without pulp  Coffee or tea without dairy products  Jell-O without fruit or toppings, No Red or Purple  Pop-xavier or Italian Ice, No Red or Purple  Chicken or Beef broth and bouillon      DRINK PLENTY OF WATER THROUGHOUT THE DAY    At 10:00 am the day before your procedure, take all 4 Dulcolax Tablets.  At 6:00 pm the day before your procedure, mix the ENTIRE bottle of Miralax (238 gram or Close to it) with 64 ounces of Gatorade (NOT RED or PURPLE).  You must consume the entire 64 ounces by 8:00 pm.  Continue clear liquid diet up until midnight.    NOTHING TO EAT, DRINK, SMOKE, OR CHEW AFTER MIDNIGHT    You may brush your teeth, rinse, gargle, and spit.  Heart or Blood pressure medications ONLY with a small sip of water, unless otherwise directed.  You will be scheduled for a pre-admission phone call prior to your procedure for your chart to be reviewed with a nurse to give you more specific instructions.  Shower with regular soap and water.    YOU MUST HAVE AN ADULT EITHER DRIVE YOU OR RIDE IN A CAB WITH YOU

## 2024-12-07 ASSESSMENT — ENCOUNTER SYMPTOMS
SORE THROAT: 0
SHORTNESS OF BREATH: 0
COUGH: 0
RHINORRHEA: 0

## 2024-12-07 NOTE — PROGRESS NOTES
[facility] General Surgery   Oswaldo Grewal MD, FACS  Nara MJinny Octavio, APRN-CNP  [address], [address]  [place] [address]  P: [phone], F: [phone]    General and Robotic Surgery  Follow-Up Visit Note               PATIENT NAME: Hyun Najera   :  1955   MRN: [id number]   PCP:  Irwin Sánchez MD     TODAY'S DATE: 2024    Chief Complaint   Patient presents with    Post-Op Check      post op EMR Rt HemiColectomy 24 op note states repeat cscope in 4-6 months       HISTORY OF PRESENT ILLNESS: 68 y.o. female presents for a follow-up visit.  Patient originally had a colonoscopy back in 2024 for positive fit test.  Patient was found to have multiple polyps and a flat serrated type polyp in the cecum.  Patient subsequently underwent repeat colonoscopy and Endo mucosal resection of this polyp in the cecum and ascending colon polypectomy in 2024.  Patient comes in for a follow-up.  She is doing well.  Tolerating diet bowels are moving no pain or bleeding.  Pathology results reviewed.  My recommendations reviewed.    PAST MEDICAL HISTORY     Past Medical History:   Diagnosis Date    Adrenal nodule (HCC) 2024    3.3 cm right CT scan 3/12/24    Asthma 10/07/2013    Cholelithiasis     Chronic rhinosinusitis 10/07/2013    Family history of diabetes mellitus (DM) 10/07/2013    Former smoker 10/07/2013    Full dentures     upper and lower    Inguinal hernia 2024    CT scan 3/12/24 fat containing    Pulmonary nodule 2024    1.4 cm on CT scan 3/12/24    Umbilical hernia     CT scan 3/12/24- fat containing       PROBLEM LIST     Patient Active Problem List   Diagnosis    Allergic rhinitis    Asthma    Family history of diabetes mellitus (DM)    Chronic rhinosinusitis    Former smoker    Enthesopathy of ankle and tarsus    Exostosis    Plantar fasciitis of right foot    Sprain of left lower leg    Pain of left lower extremity    Morbid  Olumiant Counseling: I discussed with the patient the risks of Olumiant therapy including but not limited to upper respiratory tract infections, shingles, cold sores, and nausea. Live vaccines should be avoided.  This medication has been linked to serious infections; higher rate of mortality; malignancy and lymphoproliferative disorders; major adverse cardiovascular events; thrombosis; gastrointestinal perforations; neutropenia; lymphopenia; anemia; liver enzyme elevations; and lipid elevations.

## 2024-12-19 ENCOUNTER — TELEPHONE (OUTPATIENT)
Age: 69
End: 2024-12-19

## 2024-12-19 ENCOUNTER — PREP FOR PROCEDURE (OUTPATIENT)
Age: 69
End: 2024-12-19

## 2024-12-19 DIAGNOSIS — Z86.0100 HISTORY OF COLON POLYPS: ICD-10-CM

## 2024-12-19 DIAGNOSIS — K57.30 SIGMOID DIVERTICULOSIS: ICD-10-CM

## 2024-12-19 NOTE — TELEPHONE ENCOUNTER
Called patient and scheduled a diagnostic colonoscopy.      MY EXAM IS SCHEDULED FOR: Diagnostic Colonoscopy    Date of Procedure: 2025  Time: 7:00 am  Arrival Time:  5:30 am  Location:  [facility] [place]  Your scripts have been sent to:  [facility] [place]  Pre-Testin2025 AT 8:00 AM, A Nurse from [place] will call you    Patient fully instructed /mailed 2024    Orders pending

## 2024-12-20 RX ORDER — ONDANSETRON 4 MG/1
4 TABLET, FILM COATED ORAL EVERY 6 HOURS PRN
Qty: 10 TABLET | Refills: 0 | Status: SHIPPED | OUTPATIENT
Start: 2024-12-20

## 2024-12-20 RX ORDER — BISACODYL 5 MG/1
TABLET, DELAYED RELEASE ORAL
Qty: 4 TABLET | Refills: 0 | Status: SHIPPED | OUTPATIENT
Start: 2024-12-20

## 2024-12-20 RX ORDER — POLYETHYLENE GLYCOL 3350 17 G/17G
POWDER, FOR SOLUTION ORAL
Qty: 238 G | Refills: 0 | Status: SHIPPED | OUTPATIENT
Start: 2024-12-20

## 2025-01-08 ENCOUNTER — TELEPHONE (OUTPATIENT)
Dept: ONCOLOGY | Age: 70
End: 2025-01-08

## 2025-01-08 NOTE — TELEPHONE ENCOUNTER
Writer called pt to give pt number for central scheduling #[phone]. Pt stated she is going to [place] to schedule in person. Confirmed order was in pt's chart. Encouraged pt to r/s f/u with Dr. Herron after CT is scheduled.

## 2025-01-18 ENCOUNTER — OFFICE VISIT (OUTPATIENT)
Dept: FAMILY MEDICINE CLINIC | Age: 70
End: 2025-01-18
Payer: MEDICARE

## 2025-01-18 VITALS
DIASTOLIC BLOOD PRESSURE: 78 MMHG | RESPIRATION RATE: 16 BRPM | HEIGHT: 60 IN | BODY MASS INDEX: 39.27 KG/M2 | WEIGHT: 200 LBS | TEMPERATURE: 97.5 F | OXYGEN SATURATION: 97 % | HEART RATE: 76 BPM | SYSTOLIC BLOOD PRESSURE: 148 MMHG

## 2025-01-18 DIAGNOSIS — J45.21 MILD INTERMITTENT ASTHMA WITH EXACERBATION: Primary | ICD-10-CM

## 2025-01-18 PROCEDURE — 3017F COLORECTAL CA SCREEN DOC REV: CPT | Performed by: FAMILY MEDICINE

## 2025-01-18 PROCEDURE — G8427 DOCREV CUR MEDS BY ELIG CLIN: HCPCS | Performed by: FAMILY MEDICINE

## 2025-01-18 PROCEDURE — G8417 CALC BMI ABV UP PARAM F/U: HCPCS | Performed by: FAMILY MEDICINE

## 2025-01-18 PROCEDURE — G8399 PT W/DXA RESULTS DOCUMENT: HCPCS | Performed by: FAMILY MEDICINE

## 2025-01-18 PROCEDURE — 1123F ACP DISCUSS/DSCN MKR DOCD: CPT | Performed by: FAMILY MEDICINE

## 2025-01-18 PROCEDURE — 1090F PRES/ABSN URINE INCON ASSESS: CPT | Performed by: FAMILY MEDICINE

## 2025-01-18 PROCEDURE — 1036F TOBACCO NON-USER: CPT | Performed by: FAMILY MEDICINE

## 2025-01-18 PROCEDURE — 1159F MED LIST DOCD IN RCRD: CPT | Performed by: FAMILY MEDICINE

## 2025-01-18 PROCEDURE — 99214 OFFICE O/P EST MOD 30 MIN: CPT | Performed by: FAMILY MEDICINE

## 2025-01-18 RX ORDER — PREDNISONE 20 MG/1
40 TABLET ORAL DAILY
Qty: 10 TABLET | Refills: 0 | Status: SHIPPED | OUTPATIENT
Start: 2025-01-18 | End: 2025-01-23

## 2025-01-18 ASSESSMENT — ENCOUNTER SYMPTOMS
COUGH: 1
WHEEZING: 1
SINUS PRESSURE: 1
SHORTNESS OF BREATH: 1

## 2025-01-18 NOTE — PROGRESS NOTES
Take 4 tablets at 10:00 am day before procedure, Disp: 4 tablet, Rfl: 0    polyethylene glycol (MIRALAX) 17 GM/SCOOP powder, Mix 238 grams of Miralax with 64 ounces Gatorade (no red or purple) start drinking at 6:00 pm finish by 8:00 pm all of the prep, Disp: 238 g, Rfl: 0    ondansetron (ZOFRAN) 4 MG tablet, Take 1 tablet by mouth every 6 hours as needed for Nausea or Vomiting, Disp: 10 tablet, Rfl: 0    sodium-potassium-mag sulfate (SUPREP BOWEL PREP KIT) 17.5-3.13-1.6 GM/177ML SOLN solution, Take by mouth as directed for bowel prep., Disp: 1 each, Rfl: 0    acetaminophen (TYLENOL) 500 MG tablet, Take 1 tablet by mouth every 6 hours as needed for Pain, Disp: , Rfl:     vitamin D (ERGOCALCIFEROL) 1.25 MG (91984 UT) CAPS capsule, Take 1 capsule by mouth once a week, Disp: , Rfl:     calcium carbonate (OSCAL) 500 MG TABS tablet, Take 1 tablet by mouth daily, Disp: , Rfl:     fluticasone-salmeterol (ADVAIR) 250-50 MCG/ACT AEPB diskus inhaler, as needed Once daily-dr strong, Disp: , Rfl:     meclizine (ANTIVERT) 25 MG tablet, Take 1 tablet by mouth 3 times daily as needed for Dizziness, Disp: 20 tablet, Rfl: 0    Handicap Placard MISC, by Does not apply route Dx: COPD   Valid for 6 months, Disp: 1 each, Rfl: 0    fluticasone (FLONASE) 50 MCG/ACT nasal spray, 2 sprays by Nasal route daily, Disp: 16 g, Rfl: 0    Loratadine (CLARITIN PO), Take 10 mg by mouth daily, Disp: , Rfl:     albuterol sulfate HFA (PROAIR HFA) 108 (90 BASE) MCG/ACT inhaler, Inhale 2 puffs into the lungs every 6 hours as needed for Wheezing, Disp: 1 Inhaler, Rfl: 1    Subjective:     Review of Systems   Constitutional:  Negative for fever.   HENT:  Positive for congestion and sinus pressure.    Respiratory:  Positive for cough, shortness of breath and wheezing.        Objective:     BP (!) 148/78   Pulse 76   Temp 97.5 °F (36.4 °C)   Resp 16   Ht 1.524 m (5')   Wt 90.7 kg (200 lb)   SpO2 97%   BMI 39.06 kg/m²     Physical Exam  Constitutional:

## 2025-03-11 ENCOUNTER — OFFICE VISIT (OUTPATIENT)
Dept: FAMILY MEDICINE CLINIC | Age: 70
End: 2025-03-11
Payer: MEDICARE

## 2025-03-11 ENCOUNTER — TELEPHONE (OUTPATIENT)
Dept: FAMILY MEDICINE CLINIC | Age: 70
End: 2025-03-11

## 2025-03-11 VITALS
HEART RATE: 72 BPM | WEIGHT: 216 LBS | RESPIRATION RATE: 16 BRPM | BODY MASS INDEX: 39.75 KG/M2 | SYSTOLIC BLOOD PRESSURE: 126 MMHG | HEIGHT: 62 IN | TEMPERATURE: 97.3 F | DIASTOLIC BLOOD PRESSURE: 76 MMHG

## 2025-03-11 DIAGNOSIS — M25.512 LEFT SHOULDER PAIN, UNSPECIFIED CHRONICITY: Primary | ICD-10-CM

## 2025-03-11 DIAGNOSIS — Z00.00 MEDICARE ANNUAL WELLNESS VISIT, SUBSEQUENT: Primary | ICD-10-CM

## 2025-03-11 PROCEDURE — 1123F ACP DISCUSS/DSCN MKR DOCD: CPT | Performed by: FAMILY MEDICINE

## 2025-03-11 PROCEDURE — G0439 PPPS, SUBSEQ VISIT: HCPCS | Performed by: FAMILY MEDICINE

## 2025-03-11 PROCEDURE — 1159F MED LIST DOCD IN RCRD: CPT | Performed by: FAMILY MEDICINE

## 2025-03-11 PROCEDURE — 3017F COLORECTAL CA SCREEN DOC REV: CPT | Performed by: FAMILY MEDICINE

## 2025-03-11 RX ORDER — GUAIFENESIN AND DEXTROMETHORPHAN HYDROBROMIDE 600; 30 MG/1; MG/1
TABLET, EXTENDED RELEASE ORAL
Qty: 28 TABLET | Refills: 1 | Status: SHIPPED | OUTPATIENT
Start: 2025-03-11

## 2025-03-11 RX ORDER — METHYLPREDNISOLONE 4 MG/1
TABLET ORAL
Qty: 1 KIT | Refills: 0 | Status: SHIPPED | OUTPATIENT
Start: 2025-03-11

## 2025-03-11 RX ORDER — AZITHROMYCIN 250 MG/1
TABLET, FILM COATED ORAL
Qty: 6 TABLET | Refills: 0 | Status: SHIPPED | OUTPATIENT
Start: 2025-03-11

## 2025-03-11 RX ORDER — ALBUTEROL SULFATE 90 UG/1
2 INHALANT RESPIRATORY (INHALATION) 4 TIMES DAILY PRN
Qty: 18 G | Refills: 0 | Status: SHIPPED | OUTPATIENT
Start: 2025-03-11

## 2025-03-11 SDOH — ECONOMIC STABILITY: FOOD INSECURITY: WITHIN THE PAST 12 MONTHS, YOU WORRIED THAT YOUR FOOD WOULD RUN OUT BEFORE YOU GOT MONEY TO BUY MORE.: NEVER TRUE

## 2025-03-11 SDOH — ECONOMIC STABILITY: FOOD INSECURITY: WITHIN THE PAST 12 MONTHS, THE FOOD YOU BOUGHT JUST DIDN'T LAST AND YOU DIDN'T HAVE MONEY TO GET MORE.: NEVER TRUE

## 2025-03-11 ASSESSMENT — PATIENT HEALTH QUESTIONNAIRE - PHQ9
1. LITTLE INTEREST OR PLEASURE IN DOING THINGS: NOT AT ALL
2. FEELING DOWN, DEPRESSED OR HOPELESS: NOT AT ALL
SUM OF ALL RESPONSES TO PHQ QUESTIONS 1-9: 0

## 2025-03-11 ASSESSMENT — LIFESTYLE VARIABLES
HOW MANY STANDARD DRINKS CONTAINING ALCOHOL DO YOU HAVE ON A TYPICAL DAY: 1 OR 2
HOW OFTEN DO YOU HAVE A DRINK CONTAINING ALCOHOL: MONTHLY OR LESS

## 2025-03-11 NOTE — PROGRESS NOTES
Medicare Annual Wellness Visit    Hyun Najera is here for Medicare AWV    Assessment & Plan   VIS given for RSV vaccine.     No follow-ups on file.     Subjective       Patient's complete Health Risk Assessment and screening values have been reviewed and are found in Flowsheets. The following problems were reviewed today and where indicated follow up appointments were made and/or referrals ordered.    Positive Risk Factor Screenings with Interventions:             General HRA Questions:  Select all that apply: (!) New or Increased Pain    Interventions - Pain:  Patient comments: left shoulder pain- using Lidocaine patches, and ice and with Tylenol and Motrin         Abnormal BMI (obese):  Body mass index is 40.15 kg/m². (!) Abnormal    Interventions:  See AVS for additional education material  Heart healthy diet and exercise information reviewed and provided in AVS.             Advanced Directives:  Do you have a Living Will?: (!) No    Intervention:  Living will information provided in AVS.                     Objective   Vitals:    03/11/25 1004   BP: 126/76   Pulse: 72   Resp: 16   Temp: 97.3 °F (36.3 °C)   Weight: 98 kg (216 lb)   Height: 1.562 m (5' 1.5\")      Body mass index is 40.15 kg/m².                    Allergies   Allergen Reactions    Latex Dermatitis    Aleve [Naproxen Sodium] Other (See Comments)     Asthma attack    Adhesive Tape      Tears skin when removed    Asa [Aspirin]      Prior to Visit Medications    Medication Sig Taking? Authorizing Provider   ondansetron (ZOFRAN) 4 MG tablet Take 1 tablet by mouth every 6 hours as needed for Nausea or Vomiting Yes Nara Cunningham, APRN - CNP   acetaminophen (TYLENOL) 500 MG tablet Take 1 tablet by mouth every 6 hours as needed for Pain Yes ProviderMeryl MD   vitamin D (ERGOCALCIFEROL) 1.25 MG (42384 UT) CAPS capsule Take 1 capsule by mouth once a week Yes Meryl Jasmine MD   calcium carbonate (OSCAL) 500 MG TABS tablet Take 1 tablet by

## 2025-03-11 NOTE — TELEPHONE ENCOUNTER
Z-Reynaldo, Medrol Dosepak, albuterol HFA and Mucinex DM ordered.  Please refer patient to Ortho for her left shoulder pain

## 2025-03-11 NOTE — TELEPHONE ENCOUNTER
The patient was seen for AWV today but had the following complaints:    1)  She has been having a cough with clear to thick yellow sputum, wheezing, SOB, fatigue which started 1 week ago.  She wondered if she could get some steroids and antibiotics sent to [facility] [address].    2)  she remembers pulling on sheets on 12/25/24 which started her left shoulder pain that has worsened over the past 2 weeks.      Please advise.

## 2025-03-24 ENCOUNTER — OFFICE VISIT (OUTPATIENT)
Dept: ORTHOPEDIC SURGERY | Age: 70
End: 2025-03-24
Payer: MEDICARE

## 2025-03-24 VITALS — HEIGHT: 60 IN | RESPIRATION RATE: 14 BRPM | BODY MASS INDEX: 42.6 KG/M2 | WEIGHT: 217 LBS

## 2025-03-24 DIAGNOSIS — M25.512 LEFT SHOULDER PAIN, UNSPECIFIED CHRONICITY: ICD-10-CM

## 2025-03-24 DIAGNOSIS — M75.82 ROTATOR CUFF TENDINITIS, LEFT: Primary | ICD-10-CM

## 2025-03-24 PROCEDURE — 1123F ACP DISCUSS/DSCN MKR DOCD: CPT | Performed by: ORTHOPAEDIC SURGERY

## 2025-03-24 PROCEDURE — 1090F PRES/ABSN URINE INCON ASSESS: CPT | Performed by: ORTHOPAEDIC SURGERY

## 2025-03-24 PROCEDURE — G8399 PT W/DXA RESULTS DOCUMENT: HCPCS | Performed by: ORTHOPAEDIC SURGERY

## 2025-03-24 PROCEDURE — 1036F TOBACCO NON-USER: CPT | Performed by: ORTHOPAEDIC SURGERY

## 2025-03-24 PROCEDURE — 1125F AMNT PAIN NOTED PAIN PRSNT: CPT | Performed by: ORTHOPAEDIC SURGERY

## 2025-03-24 PROCEDURE — 20610 DRAIN/INJ JOINT/BURSA W/O US: CPT | Performed by: ORTHOPAEDIC SURGERY

## 2025-03-24 PROCEDURE — G8428 CUR MEDS NOT DOCUMENT: HCPCS | Performed by: ORTHOPAEDIC SURGERY

## 2025-03-24 PROCEDURE — G8417 CALC BMI ABV UP PARAM F/U: HCPCS | Performed by: ORTHOPAEDIC SURGERY

## 2025-03-24 PROCEDURE — 3017F COLORECTAL CA SCREEN DOC REV: CPT | Performed by: ORTHOPAEDIC SURGERY

## 2025-03-24 PROCEDURE — 99203 OFFICE O/P NEW LOW 30 MIN: CPT | Performed by: ORTHOPAEDIC SURGERY

## 2025-03-24 RX ORDER — TRIAMCINOLONE ACETONIDE 40 MG/ML
40 INJECTION, SUSPENSION INTRA-ARTICULAR; INTRAMUSCULAR ONCE
Status: COMPLETED | OUTPATIENT
Start: 2025-03-24 | End: 2025-03-24

## 2025-03-24 RX ORDER — LIDOCAINE HYDROCHLORIDE 10 MG/ML
3 INJECTION, SOLUTION INFILTRATION; PERINEURAL ONCE
Status: COMPLETED | OUTPATIENT
Start: 2025-03-24 | End: 2025-03-24

## 2025-03-24 RX ADMIN — TRIAMCINOLONE ACETONIDE 40 MG: 40 INJECTION, SUSPENSION INTRA-ARTICULAR; INTRAMUSCULAR at 09:40

## 2025-03-24 RX ADMIN — LIDOCAINE HYDROCHLORIDE 3 ML: 10 INJECTION, SOLUTION INFILTRATION; PERINEURAL at 09:41

## 2025-03-24 NOTE — PROGRESS NOTES
abd/IR     Crepitation  No    Crepitation No  Dyskenesia  No    Dyskenesia No      Muscle strength:    Right       Left    Deltoid   5    Deltoid   5  Supraspinatus  5    Supraspinatus  5  ER   5    ER   4+  IR   5    IR   5    Special tests    Right   Rotator Cuff    Left    n   Painful arc    y   n   Pain with ER    y    n   Neer's     y    n   Hawkin's    y    n   Drop Arm    n  n   Lift off/Belly Press   n  n   ER Lag    n  n   Hornblower's    n          AC Joint  n   AC tenderness   n  n   Cross-chest adduction  n       Labrum/biceps    n   [place]'s    Y (equivocal)  n   Crank Test    n  n   Biceps sheer    y      n   Speed's/Yergason's   y    n   Tenderness Biceps Groove  y    n   Chad's    n         Instability  n   Ant Apprehension   n    n   Post Apprehension   n    n   Ant Load shift    n    n   Post Load shift   n   n   Sulcus     n  n   Beighton Score   n  n   Relocation test   n  n   Aurelia/Jerk test     n  n   Ru-superior escape  n       Imaging:  Xrays: 4 views of the left shoulder obtained on 3/24/2025 were independently reviewed  Indications: Left shoulder pain  Findings: Normal glenohumeral and acromioclavicular joint spaces.  Mild osteophytic change involving the distal end of the clavicle and acromion at the AC joint.  Type II acromion.  No obvious fracture, dislocation or subluxation.  Impression: Left shoulder radiographs with mild degenerative changes at the acromioclavicular joint as outlined above    Impression/Plan:     Hyun Najera is a 69 y.o. old female with left shoulder pain that is consistent with rotator cuff tendinitis.  We discussed treatment options available to her, including nonoperative and operative intervention.  At this time I believe she will benefit from conservative management.  Consequently, a prescription for therapy was provided.  We also discussed use of a prescription NSAID versus a cortisone injection and at this time she has elected to proceed with a cortisone

## 2025-04-01 ENCOUNTER — HOSPITAL ENCOUNTER (OUTPATIENT)
Dept: PHYSICAL THERAPY | Age: 70
Setting detail: THERAPIES SERIES
Discharge: HOME | End: 2025-04-01
Attending: ORTHOPAEDIC SURGERY
Payer: MEDICARE

## 2025-04-01 PROCEDURE — 97161 PT EVAL LOW COMPLEX 20 MIN: CPT

## 2025-04-01 PROCEDURE — 97110 THERAPEUTIC EXERCISES: CPT

## 2025-04-01 NOTE — THERAPY EVALUATION
[facility]   Outpatient Rehabilitation & Therapy  [address]  P: [phone]   F: [phone]     Physical Therapy Evaluation    Date:  2025  Patient: Hyun Najera  : 1955  MRN: [id number]  Physician: Diaz Narvaez MD      Insurance: Medicare(progress note due by 10th visit)  Medical Diagnosis: M75.82 (ICD-10-CM) - Rotator cuff tendinitis, left    Rehab Codes: M25.512 left shoulder pain, M25.612 left shoulder stiffness  Onset Date: 24                                 Next 's appt: TBD    Subjective:   CC: left shoulder pain  HPI: She reports that on xmas harry in which she was putting on a sheet on the bed and felt a ripping in her left shoulder. She reports she had pain in it until last week in which she got an injection and now it is a dull ache. She reports that her motion has improved since then with greater ease. She reports that she had difficulty pulling with the shoulder including cooking and washing clothes. She takes care of her brother and helps out with her brothers son. Ice and heat will help pain    Hand Dominant: [x] Right   [] Left    Per note:  Evaluate and treat left shoulder. Include work on left shoulder ROM, RTC/Scapular stabilizer/biceps strengthening. Scapular posture and dynamics. Modalities as needed. 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   2-3 times a week for 4-6 weeks.    PMHx: [] Unremarkable [] Diabetes [] HTN  [] Pacemaker   [] MI/Heart Problems [] Cancer [] Arthritis [x] Asthma                         [x] refer to full medical chart  In [facility]  [] Other:        Comorbidities:   [x] Obesity [] Dialysis  [] Other:   [] Asthma/COPD [] Dementia [] Other:   [] Stroke [] Sleep apnea [] Other:   [] Vascular disease [] Rheumatic disease [] Other:     Tests: [x] X-Ray: 3/24/25 [] MRI:  [] Other:  Findings: Normal glenohumeral and acromioclavicular joint spaces.  Mild osteophytic change involving the distal end of the clavicle and acromion at the AC

## 2025-04-04 ENCOUNTER — HOSPITAL ENCOUNTER (OUTPATIENT)
Dept: PREADMISSION TESTING | Age: 70
Discharge: HOME | End: 2025-04-08

## 2025-04-04 VITALS — HEIGHT: 60 IN | WEIGHT: 200 LBS | BODY MASS INDEX: 39.27 KG/M2

## 2025-04-04 NOTE — PROGRESS NOTES
Pre-op Instructions For Out-Patient Endoscopy Surgery    Medication Instructions:  Please stop herbs and any supplements now (includes vitamins and minerals).    For these medications:  Dulaglutide (Trulicity), Exenatide (Byetta and Bydureon, Liraglutide (Victoza), Lixisenatide (Adlyxin), Semaglutide (Ozempic and Rybelsus), Tirzepatide (Mounjaro, Zepbound)- Stop 1 week prior if taking weekly or 1 day prior if taking every 12 hours or daily.     Please contact your surgeon and prescribing physician for pre-op instructions for any blood thinners.    If you have inhalers/aerosol treatments at home, please use them the morning of your surgery and bring the inhalers with you to the hospital.    Please take the following medications the morning of your surgery with a sip of water:    Inhalers    Surgery Instructions:  After midnight before surgery:  Do not eat or drink anything, including water, mints, gum, and hard candy.  You may brush your teeth without swallowing.  No smoking, chewing tobacco, or street drugs.     ** Please Follow Bowel Prep instructions if given by surgeon's office**    Please shower or bathe before surgery.       Please do not wear any cologne, lotion, powder, jewelry, piercings, perfume, makeup, nail polish, hair accessories, or hair spray on the day of surgery.  Wear loose comfortable clothing.    Leave your valuables at home but bring a payment source for any after-surgery prescriptions you plan to fill at [facility].  Bring a storage case for any glasses/contacts.    An adult who is responsible for you MUST drive you home and should be with you for the first 24 hours after surgery.     The Day of Surgery:  Arrive at [facility] Entr[facility] at the time directed by your surgeon and check in at the desk.     If you have a living will or healthcare power of , please bring a copy.    You will be taken to the pre-op holding area where you will be prepared for

## 2025-04-08 ENCOUNTER — HOSPITAL ENCOUNTER (OUTPATIENT)
Dept: PHYSICAL THERAPY | Age: 70
Setting detail: THERAPIES SERIES
Discharge: HOME | End: 2025-04-08
Attending: ORTHOPAEDIC SURGERY
Payer: MEDICARE

## 2025-04-08 NOTE — FLOWSHEET NOTE
[facility]   Outpatient Rehabilitation & Therapy  [address]  P: [phone]   F: [phone]     Physical Therapy Cancel/No Show note    Date: 2025  Patient: Hyun Najera  : 1955  MRN: [id number]    Visit Count:   Cancels/No Shows to date:     For today's appointment patient:    [x]  Cancelled    [] Rescheduled appointment    [] No-show     Reason given by patient:    []  Patient ill    []  Conflicting appointment    [] No transportation      [] Conflict with work    [] No reason given    [] Weather related    [] COVID-19    [x] Other:      Comments:  Patient reports she hurt her back and unable to make it to appt today.       [] Next appointment was confirmed    Electronically signed by: Tip Starks PT

## 2025-04-10 NOTE — PRE-PROCEDURE INSTRUCTIONS
No answer, left message ?                             Unable to leave message ?    When were you told to arrive at hospital ?  0530    Do you have a  ? YES    Are you on any blood thinners ? NO                    If yes when did you stop taking ?    Do you have your prep Rx filled and instruction ?  YES    Nothing to eat the day before , only clear liquids.    Are you experiencing any covid symptoms ? NO    Do you have any infections or rash we should be aware of ? NO      Do you have the Hibiclens soap to use the night before and the morning of surgery ?N/A    Nothing to eat or drink after midnight, only a sip of water to take any medication instructed to take the night before.  Wear comfortable clothing, leave any valuables at home, remove any jewelry and body piercing .

## 2025-04-11 ENCOUNTER — ANESTHESIA EVENT (OUTPATIENT)
Dept: ENDOSCOPY | Age: 70
End: 2025-04-11
Payer: MEDICARE

## 2025-04-13 NOTE — H&P
mouth every 6 hours as needed for Pain      fluticasone-salmeterol (ADVAIR) 250-50 MCG/ACT AEPB diskus inhaler as needed Once daily-dr strong      meclizine (ANTIVERT) 25 MG tablet Take 1 tablet by mouth 3 times daily as needed for Dizziness 20 tablet 0    vitamin D (ERGOCALCIFEROL) 1.25 MG (50675 UT) CAPS capsule Take 1 capsule by mouth once a week      calcium carbonate (OSCAL) 500 MG TABS tablet Take 1 tablet by mouth daily      Handicap Placard MISC by Does not apply route Dx: COPD     Valid for 6 months 1 each 0    fluticasone (FLONASE) 50 MCG/ACT nasal spray 2 sprays by Nasal route daily 16 g 0    Loratadine (CLARITIN PO) Take 10 mg by mouth daily      albuterol sulfate HFA (PROAIR HFA) 108 (90 BASE) MCG/ACT inhaler Inhale 2 puffs into the lungs every 6 hours as needed for Wheezing 1 Inhaler 1     Notation: Above medications are not currently reconciled at time of signing this H&P note, to be reconciled in pre-op per RN.     Review of Systems   Constitutional:  Negative for chills and fever.   HENT:  Negative for congestion and sore throat.    Respiratory:  Negative for cough, shortness of breath and wheezing.    Cardiovascular:  Negative for chest pain and palpitations.   Gastrointestinal:         See HPI   Genitourinary:  Negative for dysuria and hematuria.   Neurological:  Negative for speech difficulty.         GENERAL PHYSICAL EXAM     Vitals: Review vitals per RN flowsheet.                              Physical Exam  Constitutional:       General: She is not in acute distress.     Appearance: She is well-developed. She is not ill-appearing.   HENT:      Head: Normocephalic and atraumatic.      Nose: Nose normal.      Mouth/Throat:      Mouth: Mucous membranes are moist.      Pharynx: Oropharynx is clear. No oropharyngeal exudate or posterior oropharyngeal erythema.   Eyes:      General: No scleral icterus.        Right eye: No discharge.         Left eye: No discharge.      Pupils: Pupils are equal, round,

## 2025-04-14 ENCOUNTER — ANESTHESIA (OUTPATIENT)
Dept: ENDOSCOPY | Age: 70
End: 2025-04-14
Payer: MEDICARE

## 2025-04-14 ENCOUNTER — HOSPITAL ENCOUNTER (OUTPATIENT)
Age: 70
Setting detail: OUTPATIENT SURGERY
Discharge: HOME | End: 2025-04-14
Attending: SURGERY | Admitting: SURGERY
Payer: MEDICARE

## 2025-04-14 VITALS
SYSTOLIC BLOOD PRESSURE: 121 MMHG | WEIGHT: 200 LBS | BODY MASS INDEX: 39.27 KG/M2 | OXYGEN SATURATION: 97 % | HEIGHT: 60 IN | DIASTOLIC BLOOD PRESSURE: 85 MMHG | TEMPERATURE: 96.6 F | RESPIRATION RATE: 16 BRPM | HEART RATE: 60 BPM

## 2025-04-14 DIAGNOSIS — Z86.0100 HISTORY OF COLON POLYPS: ICD-10-CM

## 2025-04-14 DIAGNOSIS — K57.30 SIGMOID DIVERTICULOSIS: ICD-10-CM

## 2025-04-14 PROCEDURE — 3700000001 HC ADD 15 MINUTES (ANESTHESIA): Performed by: SURGERY

## 2025-04-14 PROCEDURE — 7100000010 HC PHASE II RECOVERY - FIRST 15 MIN: Performed by: SURGERY

## 2025-04-14 PROCEDURE — 3700000000 HC ANESTHESIA ATTENDED CARE: Performed by: SURGERY

## 2025-04-14 PROCEDURE — 7100000031 HC ASPR PHASE II RECOVERY - ADDTL 15 MIN: Performed by: SURGERY

## 2025-04-14 PROCEDURE — 3609010300 HC COLONOSCOPY W/BIOPSY SINGLE/MULTIPLE: Performed by: SURGERY

## 2025-04-14 PROCEDURE — 7100000000 HC PACU RECOVERY - FIRST 15 MIN: Performed by: SURGERY

## 2025-04-14 PROCEDURE — 6360000002 HC RX W HCPCS: Performed by: NURSE ANESTHETIST, CERTIFIED REGISTERED

## 2025-04-14 PROCEDURE — 7100000011 HC PHASE II RECOVERY - ADDTL 15 MIN: Performed by: SURGERY

## 2025-04-14 PROCEDURE — 7100000001 HC PACU RECOVERY - ADDTL 15 MIN: Performed by: SURGERY

## 2025-04-14 PROCEDURE — 7100000030 HC ASPR PHASE II RECOVERY - FIRST 15 MIN: Performed by: SURGERY

## 2025-04-14 PROCEDURE — 2709999900 HC NON-CHARGEABLE SUPPLY: Performed by: SURGERY

## 2025-04-14 PROCEDURE — 2580000003 HC RX 258: Performed by: ANESTHESIOLOGY

## 2025-04-14 PROCEDURE — 88305 TISSUE EXAM BY PATHOLOGIST: CPT

## 2025-04-14 RX ORDER — PROPOFOL 10 MG/ML
INJECTION, EMULSION INTRAVENOUS
Status: DISCONTINUED | OUTPATIENT
Start: 2025-04-14 | End: 2025-04-14 | Stop reason: SDUPTHER

## 2025-04-14 RX ORDER — SODIUM CHLORIDE 9 MG/ML
INJECTION, SOLUTION INTRAVENOUS PRN
Status: DISCONTINUED | OUTPATIENT
Start: 2025-04-14 | End: 2025-04-14 | Stop reason: HOSPADM

## 2025-04-14 RX ORDER — METOCLOPRAMIDE HYDROCHLORIDE 5 MG/ML
10 INJECTION INTRAMUSCULAR; INTRAVENOUS
Status: DISCONTINUED | OUTPATIENT
Start: 2025-04-14 | End: 2025-04-14 | Stop reason: HOSPADM

## 2025-04-14 RX ORDER — SODIUM CHLORIDE 0.9 % (FLUSH) 0.9 %
5-40 SYRINGE (ML) INJECTION EVERY 12 HOURS SCHEDULED
Status: DISCONTINUED | OUTPATIENT
Start: 2025-04-14 | End: 2025-04-14 | Stop reason: HOSPADM

## 2025-04-14 RX ORDER — LABETALOL HYDROCHLORIDE 5 MG/ML
10 INJECTION, SOLUTION INTRAVENOUS
Status: DISCONTINUED | OUTPATIENT
Start: 2025-04-14 | End: 2025-04-14 | Stop reason: HOSPADM

## 2025-04-14 RX ORDER — ONDANSETRON 2 MG/ML
INJECTION INTRAMUSCULAR; INTRAVENOUS
Status: DISCONTINUED | OUTPATIENT
Start: 2025-04-14 | End: 2025-04-14 | Stop reason: SDUPTHER

## 2025-04-14 RX ORDER — DEXAMETHASONE SODIUM PHOSPHATE 4 MG/ML
INJECTION, SOLUTION INTRA-ARTICULAR; INTRALESIONAL; INTRAMUSCULAR; INTRAVENOUS; SOFT TISSUE
Status: DISCONTINUED | OUTPATIENT
Start: 2025-04-14 | End: 2025-04-14 | Stop reason: SDUPTHER

## 2025-04-14 RX ORDER — HYDRALAZINE HYDROCHLORIDE 20 MG/ML
10 INJECTION INTRAMUSCULAR; INTRAVENOUS
Status: DISCONTINUED | OUTPATIENT
Start: 2025-04-14 | End: 2025-04-14 | Stop reason: HOSPADM

## 2025-04-14 RX ORDER — DIPHENHYDRAMINE HYDROCHLORIDE 50 MG/ML
12.5 INJECTION, SOLUTION INTRAMUSCULAR; INTRAVENOUS
Status: DISCONTINUED | OUTPATIENT
Start: 2025-04-14 | End: 2025-04-14 | Stop reason: HOSPADM

## 2025-04-14 RX ORDER — FENTANYL CITRATE 50 UG/ML
INJECTION, SOLUTION INTRAMUSCULAR; INTRAVENOUS
Status: DISCONTINUED | OUTPATIENT
Start: 2025-04-14 | End: 2025-04-14 | Stop reason: SDUPTHER

## 2025-04-14 RX ORDER — SODIUM CHLORIDE 0.9 % (FLUSH) 0.9 %
5-40 SYRINGE (ML) INJECTION PRN
Status: DISCONTINUED | OUTPATIENT
Start: 2025-04-14 | End: 2025-04-14 | Stop reason: HOSPADM

## 2025-04-14 RX ORDER — ONDANSETRON 2 MG/ML
4 INJECTION INTRAMUSCULAR; INTRAVENOUS
Status: DISCONTINUED | OUTPATIENT
Start: 2025-04-14 | End: 2025-04-14 | Stop reason: HOSPADM

## 2025-04-14 RX ORDER — SODIUM CHLORIDE, SODIUM LACTATE, POTASSIUM CHLORIDE, CALCIUM CHLORIDE 600; 310; 30; 20 MG/100ML; MG/100ML; MG/100ML; MG/100ML
INJECTION, SOLUTION INTRAVENOUS CONTINUOUS
Status: DISCONTINUED | OUTPATIENT
Start: 2025-04-14 | End: 2025-04-14 | Stop reason: HOSPADM

## 2025-04-14 RX ORDER — NALOXONE HYDROCHLORIDE 0.4 MG/ML
INJECTION, SOLUTION INTRAMUSCULAR; INTRAVENOUS; SUBCUTANEOUS PRN
Status: DISCONTINUED | OUTPATIENT
Start: 2025-04-14 | End: 2025-04-14 | Stop reason: HOSPADM

## 2025-04-14 RX ORDER — LIDOCAINE HYDROCHLORIDE 10 MG/ML
1 INJECTION, SOLUTION EPIDURAL; INFILTRATION; INTRACAUDAL; PERINEURAL
Status: DISCONTINUED | OUTPATIENT
Start: 2025-04-14 | End: 2025-04-14 | Stop reason: HOSPADM

## 2025-04-14 RX ORDER — FENTANYL CITRATE 0.05 MG/ML
25 INJECTION, SOLUTION INTRAMUSCULAR; INTRAVENOUS EVERY 5 MIN PRN
Status: DISCONTINUED | OUTPATIENT
Start: 2025-04-14 | End: 2025-04-14 | Stop reason: HOSPADM

## 2025-04-14 RX ORDER — LIDOCAINE HYDROCHLORIDE 10 MG/ML
INJECTION, SOLUTION EPIDURAL; INFILTRATION; INTRACAUDAL; PERINEURAL
Status: DISCONTINUED | OUTPATIENT
Start: 2025-04-14 | End: 2025-04-14 | Stop reason: SDUPTHER

## 2025-04-14 RX ADMIN — DEXAMETHASONE SODIUM PHOSPHATE 4 MG: 4 INJECTION INTRA-ARTICULAR; INTRALESIONAL; INTRAMUSCULAR; INTRAVENOUS; SOFT TISSUE at 07:20

## 2025-04-14 RX ADMIN — FENTANYL CITRATE 50 MCG: 50 INJECTION INTRAMUSCULAR; INTRAVENOUS at 07:18

## 2025-04-14 RX ADMIN — PROPOFOL 40 MG: 10 INJECTION, EMULSION INTRAVENOUS at 07:30

## 2025-04-14 RX ADMIN — LIDOCAINE HYDROCHLORIDE 50 MG: 10 INJECTION, SOLUTION EPIDURAL; INFILTRATION; INTRACAUDAL; PERINEURAL at 07:15

## 2025-04-14 RX ADMIN — SODIUM CHLORIDE, POTASSIUM CHLORIDE, SODIUM LACTATE AND CALCIUM CHLORIDE: 600; 310; 30; 20 INJECTION, SOLUTION INTRAVENOUS at 06:22

## 2025-04-14 RX ADMIN — FENTANYL CITRATE 50 MCG: 50 INJECTION INTRAMUSCULAR; INTRAVENOUS at 07:15

## 2025-04-14 RX ADMIN — ONDANSETRON 4 MG: 2 INJECTION, SOLUTION INTRAMUSCULAR; INTRAVENOUS at 07:20

## 2025-04-14 RX ADMIN — PROPOFOL 200 MG: 10 INJECTION, EMULSION INTRAVENOUS at 07:15

## 2025-04-14 ASSESSMENT — ENCOUNTER SYMPTOMS
WHEEZING: 0
COUGH: 0
SORE THROAT: 0
SHORTNESS OF BREATH: 0

## 2025-04-14 ASSESSMENT — PAIN - FUNCTIONAL ASSESSMENT
PAIN_FUNCTIONAL_ASSESSMENT: NONE - DENIES PAIN
PAIN_FUNCTIONAL_ASSESSMENT: 0-10
PAIN_FUNCTIONAL_ASSESSMENT: 0-10

## 2025-04-14 NOTE — ANESTHESIA PRE PROCEDURE
Department of Anesthesiology  Preprocedure Note       Name:  Hyun Najera   Age:  69 y.o.  :  1955                                          MRN:  [id number]         Date:  2025      Surgeon: Surgeon(s):  Oswaldo Grewal MD    Procedure: Procedure(s):  COLONOSCOPY DIAGNOSTIC    Medications prior to admission:   Prior to Admission medications    Medication Sig Start Date End Date Taking? Authorizing Provider   ondansetron (ZOFRAN) 4 MG tablet Take 1 tablet by mouth every 6 hours as needed for Nausea or Vomiting 24  Yes Nara Cunningham APRN - CNP   acetaminophen (TYLENOL) 500 MG tablet Take 1 tablet by mouth every 6 hours as needed for Pain   Yes ProviderMeryl MD   vitamin D (ERGOCALCIFEROL) 1.25 MG (15291 UT) CAPS capsule Take 1 capsule by mouth once a week   Yes Provider, MD Meryl   calcium carbonate (OSCAL) 500 MG TABS tablet Take 1 tablet by mouth daily   Yes ProviderMeryl MD   fluticasone-salmeterol (ADVAIR) 250-50 MCG/ACT AEPB diskus inhaler as needed Once daily-dr strong   Yes Provider, MD Meryl   meclizine (ANTIVERT) 25 MG tablet Take 1 tablet by mouth 3 times daily as needed for Dizziness 23  Yes Irwin Sánchez MD   methylPREDNISolone (MEDROL DOSEPACK) 4 MG tablet Take by mouth as directed.  Patient not taking: Reported on 3/24/2025 3/11/25   Irwin Sánchez MD   albuterol sulfate HFA (VENTOLIN HFA) 108 (90 Base) MCG/ACT inhaler Inhale 2 puffs into the lungs 4 times daily as needed for Wheezing 3/11/25   Irwin Sánchez MD   dextromethorphan-guaiFENesin (MUCINEX DM)  MG per extended release tablet Take 1-2 tablets every 12 hours as needed for cough 3/11/25   Irwin Sánchez MD   Handicap Placard MISC by Does not apply route Dx: COPD     Valid for 6 months 22   Maribell Parra APRN - NP   fluticasone (FLONASE) 50 MCG/ACT nasal spray 2 sprays by Nasal route daily 22   Marla Dale APRN - CNP   Loratadine (CLARITIN PO) Take 10  Detail Level: Simple

## 2025-04-14 NOTE — OP NOTE
[facility] Surgery   Oswaldo Grewal MD, FACS  Nara Cunningham, APRN-CNP  [address], [address]  [place] [address]  P: [phone], F: [phone]    PROCEDURE NOTE    DATE OF PROCEDURE: 4/14/2025    SURGEON: Oswaldo Grewal MD    ASSISTANT: None    PREOPERATIVE DIAGNOSIS: History of colon polyp status post EMR November 2024 with benign pathology    POSTOPERATIVE DIAGNOSIS: Post EMR changes cecum.  Diminutive ascending colon polyp.  Scattered diverticulosis.    OPERATION: Total colonoscopy to cecum with intubation of terminal ileum.  Multiple biopsies from the EMR site in the cecum.  Ascending colon polypectomy with cold biopsy forceps    ANESTHESIA: General    ESTIMATED BLOOD LOSS: None    COMPLICATIONS: None     SPECIMENS:  Was Obtained:     HISTORY: The patient is a 69 y.o. year old female with history of above preop diagnosis.  I recommended colonoscopy with possible biopsy or polypectomy and I explained the risk, benefits, expected outcome, and alternatives to the procedure.  Risks included but are not limited to bleeding, infection, respiratory distress, hypotension, and perforation of the colon and possibility of missing a lesion.  The patient understands and is in agreement.      PROCEDURE: The patient was given IV conscious sedation.  The patient's SPO2 remained above 90% throughout the procedure. Digital rectal exam was normal.  The colonoscope was inserted through the anus into the rectum and advanced under direct vision to the cecum without difficulty.  Terminal ileum was examined for approximately 2 inches.  The prep was good.      Findings:  Terminal ileum: normal    Cecum/Ascending colon: abnormal: Diminutive polyp ascending colon removed with cold biopsy forceps.  Post EMR changes in the cecum with all 4 resolution clips in place.  Multiple biopsies obtained from the site.  Pathology from the EMR revealed submucosal lipoma    Transverse colon: abnormal:

## 2025-04-14 NOTE — ANESTHESIA POSTPROCEDURE EVALUATION
Department of Anesthesiology  Postprocedure Note    Patient: Hyun Najera  MRN: [id number]  YOB: 1955  Date of evaluation: 4/14/2025    Procedure Summary       Date: 04/14/25 Room / Location: [place] [address] / [facility]    Anesthesia Start: 0709 Anesthesia Stop: 0802    Procedure: COLONOSCOPY DIAGNOSTIC WITH BIOPSY Diagnosis:       History of colon polyps      Sigmoid diverticulosis      (History of colon polyps [Z86.0100])      (Sigmoid diverticulosis [K57.30])    Surgeons: Oswaldo Grewal MD Responsible Provider: Krissy Marie MD    Anesthesia Type: general ASA Status: 3            Anesthesia Type: No value filed.    Don Phase I: Don Score: 10    Don Phase II: Don Score: 10    Anesthesia Post Evaluation    Comments: POST- ANESTHESIA EVALUATION       Pt Name: Hyun Najera  MRN: [id number]  YOB: 1955  Date of evaluation: 4/14/2025  Time:  9:37 AM      /85   Pulse 60   Temp (!) 96.6 °F (35.9 °C) (Infrared)   Resp 16   Ht 1.524 m (5')   Wt 90.7 kg (200 lb)   SpO2 97%   BMI 39.06 kg/m²      Consciousness Level  Awake  Cardiopulmonary Status  Stable  Pain Adequately Treated YES  Nausea / Vomiting  NO  Adequate Hydration  YES  Anesthesia Related Complications NONE      Electronically signed by Krissy Marie MD on 4/14/2025 at 9:37 AM      No notable events documented.

## 2025-04-16 LAB — SURGICAL PATHOLOGY REPORT: NORMAL

## 2025-04-28 ENCOUNTER — TELEPHONE (OUTPATIENT)
Age: 70
End: 2025-04-28

## 2025-04-28 ENCOUNTER — OFFICE VISIT (OUTPATIENT)
Age: 70
End: 2025-04-28
Payer: MEDICARE

## 2025-04-28 VITALS
HEIGHT: 60 IN | TEMPERATURE: 97.5 F | SYSTOLIC BLOOD PRESSURE: 164 MMHG | DIASTOLIC BLOOD PRESSURE: 82 MMHG | HEART RATE: 72 BPM | OXYGEN SATURATION: 97 % | BODY MASS INDEX: 41.68 KG/M2 | WEIGHT: 212.3 LBS

## 2025-04-28 DIAGNOSIS — Z86.0100 HISTORY OF COLON POLYPS: ICD-10-CM

## 2025-04-28 DIAGNOSIS — Z98.890 HISTORY OF UMBILICAL HERNIA REPAIR: ICD-10-CM

## 2025-04-28 DIAGNOSIS — K57.90 DIVERTICULOSIS: ICD-10-CM

## 2025-04-28 DIAGNOSIS — Z87.19 HISTORY OF UMBILICAL HERNIA REPAIR: ICD-10-CM

## 2025-04-28 DIAGNOSIS — Z90.49 HISTORY OF CHOLECYSTECTOMY: ICD-10-CM

## 2025-04-28 DIAGNOSIS — K40.90 NON-RECURRENT INGUINAL HERNIA WITHOUT OBSTRUCTION OR GANGRENE, UNSPECIFIED LATERALITY: ICD-10-CM

## 2025-04-28 DIAGNOSIS — K63.5 BENIGN COLON POLYP: Primary | ICD-10-CM

## 2025-04-28 PROCEDURE — G8399 PT W/DXA RESULTS DOCUMENT: HCPCS | Performed by: NURSE PRACTITIONER

## 2025-04-28 PROCEDURE — 1159F MED LIST DOCD IN RCRD: CPT | Performed by: NURSE PRACTITIONER

## 2025-04-28 PROCEDURE — 1123F ACP DISCUSS/DSCN MKR DOCD: CPT | Performed by: NURSE PRACTITIONER

## 2025-04-28 PROCEDURE — G8417 CALC BMI ABV UP PARAM F/U: HCPCS | Performed by: NURSE PRACTITIONER

## 2025-04-28 PROCEDURE — 1036F TOBACCO NON-USER: CPT | Performed by: NURSE PRACTITIONER

## 2025-04-28 PROCEDURE — 3017F COLORECTAL CA SCREEN DOC REV: CPT | Performed by: NURSE PRACTITIONER

## 2025-04-28 PROCEDURE — G8427 DOCREV CUR MEDS BY ELIG CLIN: HCPCS | Performed by: NURSE PRACTITIONER

## 2025-04-28 PROCEDURE — 99214 OFFICE O/P EST MOD 30 MIN: CPT | Performed by: NURSE PRACTITIONER

## 2025-04-28 PROCEDURE — 1090F PRES/ABSN URINE INCON ASSESS: CPT | Performed by: NURSE PRACTITIONER

## 2025-04-28 ASSESSMENT — ENCOUNTER SYMPTOMS
COUGH: 0
SORE THROAT: 0
RHINORRHEA: 0
SHORTNESS OF BREATH: 0

## 2025-04-28 NOTE — PROGRESS NOTES
03/12/2024    1.4 cm on CT scan 3/12/24    Umbilical hernia     CT scan 3/12/24- fat containing       PROBLEM LIST     Patient Active Problem List   Diagnosis    Allergic rhinitis    Asthma    Family history of diabetes mellitus (DM)    Chronic rhinosinusitis    Former smoker    Enthesopathy of ankle and tarsus    Exostosis    Plantar fasciitis of right foot    Sprain of left lower leg    Pain of left lower extremity    Morbid obesity with BMI of 40.0-44.9, adult (HCC)    IFG (impaired fasting glucose)    Prediabetes    Mixed hyperlipidemia    Personal history of COVID-19    Status post D&C Hysteroscopy Myosure Polypectomy 8/30/2024    History of colon polyps    Sigmoid diverticulosis       SURGICAL HISTORY       Past Surgical History:   Procedure Laterality Date    CHOLECYSTECTOMY N/A 04/03/2024    CHOLECYSTECTOMY LAPAROSCOPIC ROBOTIC XI performed by Oswaldo Grewal MD at [facility] OR    COLONOSCOPY N/A 08/21/2024    COLONOSCOPY POLYPECTOMY HOT BIOPSY/ WITH RESOLUTION CLIP APPLICATION performed by Oswaldo Grewal MD at [facility] OR    COLONOSCOPY N/A 11/14/2024    COLONOSCOPY  WITH ENDOSCOPIC MUCOSAL RESECTION AND ASCENDING COLON POLYPECTOMY performed by Oswaldo Grewal MD at [facility] OR    COLONOSCOPY N/A 4/14/2025    COLONOSCOPY DIAGNOSTIC WITH BIOPSY performed by Oswaldo Grewal MD at [facility] ENDO    DILATION AND CURETTAGE OF UTERUS N/A 08/30/2024    DILATATION AND CURETTAGE HYSTEROSCOPY WITH MYOSURE SUSPECTED POLYPECTOMY performed by Edwardo Concepcion DO at [facility] OR    EYE SURGERY Bilateral     cataracts    TONSILLECTOMY  01/01/1983    UMBILICAL HERNIA REPAIR N/A 07/17/2024    OPEN HERNIA UMBILICAL REPAIR  WITH  MESH performed by Oswaldo Grewal MD at [facility] OR    WISDOM TOOTH EXTRACTION         ALLERGIES     Allergies   Allergen Reactions    Latex Dermatitis    Aleve [Naproxen Sodium] Other (See Comments)     Asthma attack    Adhesive Tape      Tears skin when removed    Asa [Aspirin]        FAMILY HISTORY   family history

## 2025-04-28 NOTE — TELEPHONE ENCOUNTER
Patient followed up today s/p colonoscopy, hx of EMR November 2024 with benign path.    When do you recommend next colonoscopy?    DATE OF PROCEDURE: 4/14/2025     SURGEON: Oswaldo Grewal MD     ASSISTANT: None     PREOPERATIVE DIAGNOSIS: History of colon polyp status post EMR November 2024 with benign pathology     POSTOPERATIVE DIAGNOSIS: Post EMR changes cecum.  Diminutive ascending colon polyp.  Scattered diverticulosis.     Findings:  Terminal ileum: normal     Cecum/Ascending colon: abnormal: Diminutive polyp ascending colon removed with cold biopsy forceps.  Post EMR changes in the cecum with all 4 resolution clips in place.  Multiple biopsies obtained from the site.  Pathology from the EMR revealed submucosal lipoma     Transverse colon: abnormal: Diverticulosis     Descending/Sigmoid colon: abnormal: Diverticulosis     Rectum/Anus: examined in normal and retroflexed positions and was normal    -- Diagnosis --   A.  Cecum, EMR biopsy site:   - Benign colonic tissue, negative for dysplasia and carcinoma.     B.  Ascending colon, biopsy:   - Superficial pieces with features suggestive of hyperplastic polyp.

## 2025-05-04 ENCOUNTER — TELEPHONE (OUTPATIENT)
Age: 70
End: 2025-05-04

## 2025-05-04 PROBLEM — K57.90 DIVERTICULOSIS: Status: ACTIVE | Noted: 2025-05-04

## 2025-05-04 PROBLEM — K63.5 BENIGN COLON POLYP: Status: ACTIVE | Noted: 2025-05-04

## 2025-05-04 NOTE — TELEPHONE ENCOUNTER
Please advise patient that I reviewed her colonoscopy findings and pathology with Dr. Grewal and he is recommending a 1 year follow up colonoscopy. Please schedule as detailed below:    SURGERY/PROCEDURE SCHEDULING- APRIL 2026  PROCEDURE NAME: Diagnostic colonoscopy    PROCEDURE DIAGNOSIS: Hx of colon polyps    ANESTHESIA TYPE: General    BLOOD THINNERS: None    CLEARANCE: None    HOSPITAL: [facility]    BOWEL PREP WITH [facility]. Pre-admission testing per protocol with [facility].     Recommend follow up in 6 months for a re-check/to discuss colonoscopy. Surgery sheet completed.

## 2025-05-05 NOTE — TELEPHONE ENCOUNTER
Patient informed and stated she is going to hold off on any surgeries for awhile. I discussed that her 6 mo f/u would be just to discuss results and future cscope. Patient understands and has no other concerns.

## 2025-08-04 ENCOUNTER — CLINICAL DOCUMENTATION (OUTPATIENT)
Dept: PHYSICAL THERAPY | Age: 70
End: 2025-08-04

## (undated) DEVICE — DEVICE TISS REM L32CM DIA3MM S STL ULT HRD HI WR RESISTANCE

## (undated) DEVICE — FORCEPS BX L240CM JAW DIA2.8MM L CAP W/ NDL MIC MESH TOOTH

## (undated) DEVICE — KIT CLN UP LIN W/ STD SAHARA TBL SHT 40X60IN DRAW/LIFT SHT

## (undated) DEVICE — FORCEPS BX L240CM WRK CHN 2.8MM STD CAP W/ NDL MIC MESH

## (undated) DEVICE — PAD,NON-ADHERENT,3X8,STERILE,LF,1/PK: Brand: MEDLINE

## (undated) DEVICE — ST. CHARLES BASIC SET UP: Brand: MEDLINE INDUSTRIES, INC.

## (undated) DEVICE — GLOVE ORANGE PI 7 1/2   MSG9075

## (undated) DEVICE — SINGLE PORT MANIFOLD: Brand: NEPTUNE 2

## (undated) DEVICE — DRESSING TRNSPAR W5XL4.5IN FLM SHT SEMIPERMEABLE WIND

## (undated) DEVICE — ST CHARLES MAJOR ABDOMINAL: Brand: MEDLINE INDUSTRIES, INC.

## (undated) DEVICE — ENDO KIT W/SYRINGE: Brand: MEDLINE INDUSTRIES, INC.

## (undated) DEVICE — ERBE NESSY® OMEGA PLATE USA (85+23)CM² , WITH CABLE 3 M: Brand: ERBE

## (undated) DEVICE — DEFENDO AIR WATER SUCTION AND BIOPSY VALVE KIT FOR  OLYMPUS: Brand: DEFENDO AIR/WATER/SUCTION AND BIOPSY VALVE

## (undated) DEVICE — DEVICE TISS REM DIA3MM L25.25IN ENDOSCP F/ IU POLYPS

## (undated) DEVICE — CLEAN CLOSURE PACK GREEN: Brand: MEDLINE INDUSTRIES, INC.

## (undated) DEVICE — GLOVE ORTHO 7 1/2   MSG9475

## (undated) DEVICE — GOWN,SIRUS,NONRNF,SETINSLV,XL,20/CS: Brand: MEDLINE

## (undated) DEVICE — BLANKET WRM W29.9XL79.1IN UP BODY FORC AIR MISTRAL-AIR

## (undated) DEVICE — SHEET, T, LAPAROTOMY, STERILE: Brand: MEDLINE

## (undated) DEVICE — SOLUTION IRRIG 1000ML 0.9% SOD CHL USP POUR PLAS BTL

## (undated) DEVICE — POLYP TRAP: Brand: TRAPEASE®

## (undated) DEVICE — 3000ML,PRESSURE INFUSER W/STOPCOCK: Brand: MEDLINE

## (undated) DEVICE — SUTURE PDS II SZ 0 L60IN ABSRB VLT L48MM CTX 1/2 CIR Z990G

## (undated) DEVICE — SUTURE PERMAHAND SZ 3-0 L18IN NONABSORBABLE BLK L26MM SH C013D

## (undated) DEVICE — SYRINGE MED 20ML STD CLR PLAS LUERSLIP TIP N CTRL DISP

## (undated) DEVICE — NEEDLE SCLERO 25GA L240CM OD0.51MM ID0.24MM EXTN L4MM SHTH

## (undated) DEVICE — TROCAR: Brand: KII FIOS FIRST ENTRY

## (undated) DEVICE — FORCEPS BX L240CM JAW DIA2.4MM ORNG L CAP W/ NDL DISP RAD

## (undated) DEVICE — SOLUTION IRRIG 1000ML STRL H2O USP PLAS POUR BTL

## (undated) DEVICE — FORCEPS BX L L240CM DIA2.4MM RAD JAW 4 HOT FOR POLYP DISP

## (undated) DEVICE — SEALER ENDOSCP NANO COAT OPN DIV CRV L JAW LIGASURE IMPACT

## (undated) DEVICE — TROCARS: Brand: KII® BALLOON BLUNT TIP SYSTEM

## (undated) DEVICE — SUTURE PDS II SZ 0 L27IN ABSRB VLT UR-6 L26MM 1/2 CIR D7185

## (undated) DEVICE — ARM DRAPE

## (undated) DEVICE — ELEVIEW SUBMUCOSAL INJECTABLE COMPOSITION 10ML

## (undated) DEVICE — ST CHARLES PERI-GYN: Brand: MEDLINE INDUSTRIES, INC.

## (undated) DEVICE — GLOVE ORANGE PI 8   MSG9080

## (undated) DEVICE — SNARE ENDOSCP AD L240CM LOOP W10MM SHTH DIA2.4MM RND INSUL

## (undated) DEVICE — COVER,MAYO STAND,STERILE: Brand: MEDLINE

## (undated) DEVICE — ST CHARLES GEN LAPAROSCOPY: Brand: MEDLINE INDUSTRIES, INC.

## (undated) DEVICE — BLADELESS OBTURATOR: Brand: WECK VISTA

## (undated) DEVICE — SUTURE MCRYL + SZ 4-0 L27IN ABSRB UD L19MM PS-2 3/8 CIR MCP426H

## (undated) DEVICE — SOLUTION IRRIG 3000ML 0.9% SOD CHL USP UROMATIC PLAS CONT

## (undated) DEVICE — LIQUIBAND RAPID ADHESIVE 36/CS 0.8ML: Brand: MEDLINE

## (undated) DEVICE — SUTURE PDS + SZ 4 0 L27IN ABSRB VLT L26MM SH 1 2 CIR PDP315H

## (undated) DEVICE — SUTURE ETHIBOND EXCEL SZ 0 L18IN NONABSORBABLE GRN L26MM MO-6 CX45D

## (undated) DEVICE — SEAL

## (undated) DEVICE — SUTURE VICRYL + SZ 3-0 L27IN ABSRB UD L26MM SH 1/2 CIR VCP416H

## (undated) DEVICE — SET,IRRIGATION,CYSTO/TUR,90": Brand: MEDLINE